# Patient Record
Sex: MALE | Race: WHITE | NOT HISPANIC OR LATINO
[De-identification: names, ages, dates, MRNs, and addresses within clinical notes are randomized per-mention and may not be internally consistent; named-entity substitution may affect disease eponyms.]

---

## 2017-05-03 ENCOUNTER — RESULT REVIEW (OUTPATIENT)
Age: 62
End: 2017-05-03

## 2017-07-21 ENCOUNTER — OUTPATIENT (OUTPATIENT)
Dept: OUTPATIENT SERVICES | Facility: HOSPITAL | Age: 62
LOS: 1 days | Discharge: HOME | End: 2017-07-21
Payer: COMMERCIAL

## 2017-07-21 ENCOUNTER — TRANSCRIPTION ENCOUNTER (OUTPATIENT)
Age: 62
End: 2017-07-21

## 2017-07-21 ENCOUNTER — RESULT REVIEW (OUTPATIENT)
Age: 62
End: 2017-07-21

## 2017-07-21 DIAGNOSIS — M71.20 SYNOVIAL CYST OF POPLITEAL SPACE [BAKER], UNSPECIFIED KNEE: Chronic | ICD-10-CM

## 2017-07-21 DIAGNOSIS — D50.9 IRON DEFICIENCY ANEMIA, UNSPECIFIED: ICD-10-CM

## 2017-07-21 DIAGNOSIS — Z98.89 OTHER SPECIFIED POSTPROCEDURAL STATES: Chronic | ICD-10-CM

## 2017-07-21 DIAGNOSIS — Z96.651 PRESENCE OF RIGHT ARTIFICIAL KNEE JOINT: Chronic | ICD-10-CM

## 2017-07-21 PROCEDURE — 88312 SPECIAL STAINS GROUP 1: CPT | Mod: 26

## 2017-07-21 PROCEDURE — 88305 TISSUE EXAM BY PATHOLOGIST: CPT | Mod: 26

## 2017-07-21 PROCEDURE — 43239 EGD BIOPSY SINGLE/MULTIPLE: CPT

## 2017-07-21 PROCEDURE — 88312 SPECIAL STAINS GROUP 1: CPT

## 2017-07-21 PROCEDURE — 88305 TISSUE EXAM BY PATHOLOGIST: CPT

## 2017-07-27 DIAGNOSIS — F43.10 POST-TRAUMATIC STRESS DISORDER, UNSPECIFIED: ICD-10-CM

## 2017-07-27 DIAGNOSIS — I10 ESSENTIAL (PRIMARY) HYPERTENSION: ICD-10-CM

## 2017-07-27 DIAGNOSIS — J45.909 UNSPECIFIED ASTHMA, UNCOMPLICATED: ICD-10-CM

## 2017-07-27 DIAGNOSIS — K29.50 UNSPECIFIED CHRONIC GASTRITIS WITHOUT BLEEDING: ICD-10-CM

## 2017-07-27 DIAGNOSIS — K21.9 GASTRO-ESOPHAGEAL REFLUX DISEASE WITHOUT ESOPHAGITIS: ICD-10-CM

## 2017-07-27 DIAGNOSIS — G47.33 OBSTRUCTIVE SLEEP APNEA (ADULT) (PEDIATRIC): ICD-10-CM

## 2017-07-27 DIAGNOSIS — E78.5 HYPERLIPIDEMIA, UNSPECIFIED: ICD-10-CM

## 2017-07-27 DIAGNOSIS — D64.9 ANEMIA, UNSPECIFIED: ICD-10-CM

## 2018-02-26 ENCOUNTER — OUTPATIENT (OUTPATIENT)
Dept: OUTPATIENT SERVICES | Facility: HOSPITAL | Age: 63
LOS: 1 days | End: 2018-02-26
Payer: COMMERCIAL

## 2018-02-26 ENCOUNTER — TRANSCRIPTION ENCOUNTER (OUTPATIENT)
Age: 63
End: 2018-02-26

## 2018-02-26 ENCOUNTER — APPOINTMENT (OUTPATIENT)
Dept: CT IMAGING | Facility: CLINIC | Age: 63
End: 2018-02-26

## 2018-02-26 DIAGNOSIS — Z00.8 ENCOUNTER FOR OTHER GENERAL EXAMINATION: ICD-10-CM

## 2018-02-26 DIAGNOSIS — Z98.89 OTHER SPECIFIED POSTPROCEDURAL STATES: Chronic | ICD-10-CM

## 2018-02-26 DIAGNOSIS — D64.9 ANEMIA, UNSPECIFIED: ICD-10-CM

## 2018-02-26 DIAGNOSIS — M71.20 SYNOVIAL CYST OF POPLITEAL SPACE [BAKER], UNSPECIFIED KNEE: Chronic | ICD-10-CM

## 2018-02-26 DIAGNOSIS — Z96.651 PRESENCE OF RIGHT ARTIFICIAL KNEE JOINT: Chronic | ICD-10-CM

## 2018-02-26 PROCEDURE — 74177 CT ABD & PELVIS W/CONTRAST: CPT | Mod: 26

## 2018-02-26 PROCEDURE — 82565 ASSAY OF CREATININE: CPT

## 2018-02-26 PROCEDURE — 74177 CT ABD & PELVIS W/CONTRAST: CPT

## 2019-02-19 ENCOUNTER — INPATIENT (INPATIENT)
Facility: HOSPITAL | Age: 64
LOS: 7 days | Discharge: HOME | DRG: 872 | End: 2019-02-27
Attending: FAMILY MEDICINE | Admitting: FAMILY MEDICINE
Payer: COMMERCIAL

## 2019-02-19 VITALS
TEMPERATURE: 97 F | HEIGHT: 70 IN | RESPIRATION RATE: 15 BRPM | SYSTOLIC BLOOD PRESSURE: 133 MMHG | HEART RATE: 93 BPM | OXYGEN SATURATION: 94 % | DIASTOLIC BLOOD PRESSURE: 74 MMHG | WEIGHT: 259.93 LBS

## 2019-02-19 DIAGNOSIS — Z98.89 OTHER SPECIFIED POSTPROCEDURAL STATES: Chronic | ICD-10-CM

## 2019-02-19 DIAGNOSIS — F43.10 POST-TRAUMATIC STRESS DISORDER, UNSPECIFIED: ICD-10-CM

## 2019-02-19 DIAGNOSIS — Z96.651 PRESENCE OF RIGHT ARTIFICIAL KNEE JOINT: Chronic | ICD-10-CM

## 2019-02-19 DIAGNOSIS — M71.20 SYNOVIAL CYST OF POPLITEAL SPACE [BAKER], UNSPECIFIED KNEE: Chronic | ICD-10-CM

## 2019-02-19 DIAGNOSIS — K57.92 DIVERTICULITIS OF INTESTINE, PART UNSPECIFIED, WITHOUT PERFORATION OR ABSCESS WITHOUT BLEEDING: ICD-10-CM

## 2019-02-19 DIAGNOSIS — K21.9 GASTRO-ESOPHAGEAL REFLUX DISEASE WITHOUT ESOPHAGITIS: ICD-10-CM

## 2019-02-19 DIAGNOSIS — I10 ESSENTIAL (PRIMARY) HYPERTENSION: ICD-10-CM

## 2019-02-19 DIAGNOSIS — J44.9 CHRONIC OBSTRUCTIVE PULMONARY DISEASE, UNSPECIFIED: ICD-10-CM

## 2019-02-19 DIAGNOSIS — G47.30 SLEEP APNEA, UNSPECIFIED: ICD-10-CM

## 2019-02-19 DIAGNOSIS — Z29.9 ENCOUNTER FOR PROPHYLACTIC MEASURES, UNSPECIFIED: ICD-10-CM

## 2019-02-19 LAB
ALBUMIN SERPL ELPH-MCNC: 3.6 G/DL — SIGNIFICANT CHANGE UP (ref 3.3–5)
ALP SERPL-CCNC: 82 U/L — SIGNIFICANT CHANGE UP (ref 40–120)
ALT FLD-CCNC: 32 U/L — SIGNIFICANT CHANGE UP (ref 12–78)
AMYLASE P1 CFR SERPL: 43 U/L — SIGNIFICANT CHANGE UP (ref 25–115)
ANION GAP SERPL CALC-SCNC: 10 MMOL/L — SIGNIFICANT CHANGE UP (ref 5–17)
APTT BLD: 30.4 SEC — SIGNIFICANT CHANGE UP (ref 28.5–37)
AST SERPL-CCNC: 21 U/L — SIGNIFICANT CHANGE UP (ref 15–37)
BILIRUB SERPL-MCNC: 0.6 MG/DL — SIGNIFICANT CHANGE UP (ref 0.2–1.2)
BUN SERPL-MCNC: 18 MG/DL — SIGNIFICANT CHANGE UP (ref 7–23)
CALCIUM SERPL-MCNC: 8.9 MG/DL — SIGNIFICANT CHANGE UP (ref 8.5–10.1)
CHLORIDE SERPL-SCNC: 103 MMOL/L — SIGNIFICANT CHANGE UP (ref 96–108)
CO2 SERPL-SCNC: 29 MMOL/L — SIGNIFICANT CHANGE UP (ref 22–31)
CREAT SERPL-MCNC: 1.2 MG/DL — SIGNIFICANT CHANGE UP (ref 0.5–1.3)
GLUCOSE SERPL-MCNC: 118 MG/DL — HIGH (ref 70–99)
HCT VFR BLD CALC: 36.6 % — LOW (ref 39–50)
HGB BLD-MCNC: 11.6 G/DL — LOW (ref 13–17)
INR BLD: 1.31 RATIO — HIGH (ref 0.88–1.16)
LIDOCAIN IGE QN: 77 U/L — SIGNIFICANT CHANGE UP (ref 73–393)
MCHC RBC-ENTMCNC: 26.5 PG — LOW (ref 27–34)
MCHC RBC-ENTMCNC: 31.7 GM/DL — LOW (ref 32–36)
MCV RBC AUTO: 83.6 FL — SIGNIFICANT CHANGE UP (ref 80–100)
NRBC # BLD: 0 /100 WBCS — SIGNIFICANT CHANGE UP (ref 0–0)
PLATELET # BLD AUTO: 169 K/UL — SIGNIFICANT CHANGE UP (ref 150–400)
POTASSIUM SERPL-MCNC: 3.8 MMOL/L — SIGNIFICANT CHANGE UP (ref 3.5–5.3)
POTASSIUM SERPL-SCNC: 3.8 MMOL/L — SIGNIFICANT CHANGE UP (ref 3.5–5.3)
PROT SERPL-MCNC: 7.9 G/DL — SIGNIFICANT CHANGE UP (ref 6–8.3)
PROTHROM AB SERPL-ACNC: 15 SEC — HIGH (ref 10–12.9)
RBC # BLD: 4.38 M/UL — SIGNIFICANT CHANGE UP (ref 4.2–5.8)
RBC # FLD: 17.5 % — HIGH (ref 10.3–14.5)
SODIUM SERPL-SCNC: 142 MMOL/L — SIGNIFICANT CHANGE UP (ref 135–145)
WBC # BLD: 14.61 K/UL — HIGH (ref 3.8–10.5)
WBC # FLD AUTO: 14.61 K/UL — HIGH (ref 3.8–10.5)

## 2019-02-19 PROCEDURE — 93010 ELECTROCARDIOGRAM REPORT: CPT

## 2019-02-19 PROCEDURE — 99223 1ST HOSP IP/OBS HIGH 75: CPT | Mod: AI

## 2019-02-19 PROCEDURE — 74177 CT ABD & PELVIS W/CONTRAST: CPT | Mod: 26

## 2019-02-19 PROCEDURE — 99223 1ST HOSP IP/OBS HIGH 75: CPT

## 2019-02-19 PROCEDURE — 71045 X-RAY EXAM CHEST 1 VIEW: CPT | Mod: 26

## 2019-02-19 PROCEDURE — 99285 EMERGENCY DEPT VISIT HI MDM: CPT

## 2019-02-19 RX ORDER — ONDANSETRON 8 MG/1
4 TABLET, FILM COATED ORAL ONCE
Qty: 0 | Refills: 0 | Status: COMPLETED | OUTPATIENT
Start: 2019-02-19 | End: 2019-02-19

## 2019-02-19 RX ORDER — ALBUTEROL 90 UG/1
2 AEROSOL, METERED ORAL EVERY 6 HOURS
Qty: 0 | Refills: 0 | Status: DISCONTINUED | OUTPATIENT
Start: 2019-02-19 | End: 2019-02-27

## 2019-02-19 RX ORDER — SIMVASTATIN 20 MG/1
40 TABLET, FILM COATED ORAL AT BEDTIME
Qty: 0 | Refills: 0 | Status: DISCONTINUED | OUTPATIENT
Start: 2019-02-19 | End: 2019-02-27

## 2019-02-19 RX ORDER — SODIUM CHLORIDE 9 MG/ML
1000 INJECTION INTRAMUSCULAR; INTRAVENOUS; SUBCUTANEOUS ONCE
Qty: 0 | Refills: 0 | Status: COMPLETED | OUTPATIENT
Start: 2019-02-19 | End: 2019-02-19

## 2019-02-19 RX ORDER — BUPROPION HYDROCHLORIDE 150 MG/1
100 TABLET, EXTENDED RELEASE ORAL
Qty: 0 | Refills: 0 | Status: DISCONTINUED | OUTPATIENT
Start: 2019-02-19 | End: 2019-02-22

## 2019-02-19 RX ORDER — MORPHINE SULFATE 50 MG/1
4 CAPSULE, EXTENDED RELEASE ORAL ONCE
Qty: 0 | Refills: 0 | Status: DISCONTINUED | OUTPATIENT
Start: 2019-02-19 | End: 2019-02-19

## 2019-02-19 RX ORDER — TIOTROPIUM BROMIDE 18 UG/1
1 CAPSULE ORAL; RESPIRATORY (INHALATION) DAILY
Qty: 0 | Refills: 0 | Status: DISCONTINUED | OUTPATIENT
Start: 2019-02-19 | End: 2019-02-27

## 2019-02-19 RX ORDER — PANTOPRAZOLE SODIUM 20 MG/1
40 TABLET, DELAYED RELEASE ORAL
Qty: 0 | Refills: 0 | Status: DISCONTINUED | OUTPATIENT
Start: 2019-02-19 | End: 2019-02-27

## 2019-02-19 RX ORDER — SERTRALINE 25 MG/1
200 TABLET, FILM COATED ORAL DAILY
Qty: 0 | Refills: 0 | Status: DISCONTINUED | OUTPATIENT
Start: 2019-02-19 | End: 2019-02-27

## 2019-02-19 RX ORDER — LACTOBACILLUS ACIDOPHILUS 100MM CELL
1 CAPSULE ORAL
Qty: 0 | Refills: 0 | Status: DISCONTINUED | OUTPATIENT
Start: 2019-02-19 | End: 2019-02-27

## 2019-02-19 RX ORDER — PIPERACILLIN AND TAZOBACTAM 4; .5 G/20ML; G/20ML
3.38 INJECTION, POWDER, LYOPHILIZED, FOR SOLUTION INTRAVENOUS EVERY 8 HOURS
Qty: 0 | Refills: 0 | Status: DISCONTINUED | OUTPATIENT
Start: 2019-02-19 | End: 2019-02-26

## 2019-02-19 RX ORDER — PIPERACILLIN AND TAZOBACTAM 4; .5 G/20ML; G/20ML
3.38 INJECTION, POWDER, LYOPHILIZED, FOR SOLUTION INTRAVENOUS ONCE
Qty: 0 | Refills: 0 | Status: COMPLETED | OUTPATIENT
Start: 2019-02-19 | End: 2019-02-19

## 2019-02-19 RX ORDER — BUDESONIDE AND FORMOTEROL FUMARATE DIHYDRATE 160; 4.5 UG/1; UG/1
2 AEROSOL RESPIRATORY (INHALATION)
Qty: 0 | Refills: 0 | Status: DISCONTINUED | OUTPATIENT
Start: 2019-02-19 | End: 2019-02-27

## 2019-02-19 RX ORDER — AMLODIPINE BESYLATE 2.5 MG/1
10 TABLET ORAL DAILY
Qty: 0 | Refills: 0 | Status: DISCONTINUED | OUTPATIENT
Start: 2019-02-19 | End: 2019-02-27

## 2019-02-19 RX ORDER — FLUTICASONE PROPIONATE 50 MCG
1 SPRAY, SUSPENSION NASAL DAILY
Qty: 0 | Refills: 0 | Status: DISCONTINUED | OUTPATIENT
Start: 2019-02-19 | End: 2019-02-27

## 2019-02-19 RX ORDER — IOHEXOL 300 MG/ML
30 INJECTION, SOLUTION INTRAVENOUS ONCE
Qty: 0 | Refills: 0 | Status: COMPLETED | OUTPATIENT
Start: 2019-02-19 | End: 2019-02-19

## 2019-02-19 RX ORDER — CLONAZEPAM 1 MG
1 TABLET ORAL THREE TIMES A DAY
Qty: 0 | Refills: 0 | Status: DISCONTINUED | OUTPATIENT
Start: 2019-02-19 | End: 2019-02-26

## 2019-02-19 RX ORDER — FAMOTIDINE 10 MG/ML
20 INJECTION INTRAVENOUS
Qty: 0 | Refills: 0 | Status: DISCONTINUED | OUTPATIENT
Start: 2019-02-19 | End: 2019-02-19

## 2019-02-19 RX ORDER — SODIUM CHLORIDE 9 MG/ML
1000 INJECTION, SOLUTION INTRAVENOUS
Qty: 0 | Refills: 0 | Status: DISCONTINUED | OUTPATIENT
Start: 2019-02-19 | End: 2019-02-20

## 2019-02-19 RX ORDER — ARIPIPRAZOLE 15 MG/1
20 TABLET ORAL DAILY
Qty: 0 | Refills: 0 | Status: DISCONTINUED | OUTPATIENT
Start: 2019-02-19 | End: 2019-02-27

## 2019-02-19 RX ORDER — TRAZODONE HCL 50 MG
200 TABLET ORAL DAILY
Qty: 0 | Refills: 0 | Status: DISCONTINUED | OUTPATIENT
Start: 2019-02-19 | End: 2019-02-27

## 2019-02-19 RX ADMIN — BUPROPION HYDROCHLORIDE 100 MILLIGRAM(S): 150 TABLET, EXTENDED RELEASE ORAL at 15:55

## 2019-02-19 RX ADMIN — MORPHINE SULFATE 4 MILLIGRAM(S): 50 CAPSULE, EXTENDED RELEASE ORAL at 08:02

## 2019-02-19 RX ADMIN — IOHEXOL 30 MILLILITER(S): 300 INJECTION, SOLUTION INTRAVENOUS at 08:02

## 2019-02-19 RX ADMIN — ARIPIPRAZOLE 20 MILLIGRAM(S): 15 TABLET ORAL at 22:24

## 2019-02-19 RX ADMIN — SODIUM CHLORIDE 1000 MILLILITER(S): 9 INJECTION INTRAMUSCULAR; INTRAVENOUS; SUBCUTANEOUS at 08:00

## 2019-02-19 RX ADMIN — SODIUM CHLORIDE 1000 MILLILITER(S): 9 INJECTION INTRAMUSCULAR; INTRAVENOUS; SUBCUTANEOUS at 09:00

## 2019-02-19 RX ADMIN — Medication 1 MILLIGRAM(S): at 15:55

## 2019-02-19 RX ADMIN — Medication 1 MILLIGRAM(S): at 21:37

## 2019-02-19 RX ADMIN — ONDANSETRON 4 MILLIGRAM(S): 8 TABLET, FILM COATED ORAL at 08:02

## 2019-02-19 RX ADMIN — PIPERACILLIN AND TAZOBACTAM 25 GRAM(S): 4; .5 INJECTION, POWDER, LYOPHILIZED, FOR SOLUTION INTRAVENOUS at 15:30

## 2019-02-19 RX ADMIN — PIPERACILLIN AND TAZOBACTAM 200 GRAM(S): 4; .5 INJECTION, POWDER, LYOPHILIZED, FOR SOLUTION INTRAVENOUS at 12:20

## 2019-02-19 RX ADMIN — Medication 1 SPRAY(S): at 21:31

## 2019-02-19 RX ADMIN — SODIUM CHLORIDE 75 MILLILITER(S): 9 INJECTION, SOLUTION INTRAVENOUS at 18:29

## 2019-02-19 RX ADMIN — Medication 1 TABLET(S): at 17:40

## 2019-02-19 RX ADMIN — PANTOPRAZOLE SODIUM 40 MILLIGRAM(S): 20 TABLET, DELAYED RELEASE ORAL at 17:37

## 2019-02-19 RX ADMIN — AMLODIPINE BESYLATE 10 MILLIGRAM(S): 2.5 TABLET ORAL at 15:55

## 2019-02-19 RX ADMIN — Medication 200 MILLIGRAM(S): at 23:31

## 2019-02-19 RX ADMIN — BUDESONIDE AND FORMOTEROL FUMARATE DIHYDRATE 2 PUFF(S): 160; 4.5 AEROSOL RESPIRATORY (INHALATION) at 21:32

## 2019-02-19 RX ADMIN — SIMVASTATIN 40 MILLIGRAM(S): 20 TABLET, FILM COATED ORAL at 21:31

## 2019-02-19 RX ADMIN — PIPERACILLIN AND TAZOBACTAM 25 GRAM(S): 4; .5 INJECTION, POWDER, LYOPHILIZED, FOR SOLUTION INTRAVENOUS at 21:33

## 2019-02-19 RX ADMIN — MORPHINE SULFATE 4 MILLIGRAM(S): 50 CAPSULE, EXTENDED RELEASE ORAL at 12:12
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
## 2019-02-19 NOTE — CONSULT NOTE ADULT - SUBJECTIVE AND OBJECTIVE BOX
HPI: 63y man presenting with worsening RLQ pain.  No previous episodes or similar symptoms.  Known to have diverticulosis from previous Colonoscopies.  Never known to have prior diverticulitis.  Hypertensive history but denies any other significant medical problems.    PAST MEDICAL & SURGICAL HISTORY:  Sleep apnea: does not use CPAP machine  Osteoarthrosis, localized  PTSD (post-traumatic stress disorder): 2001  GERD (gastroesophageal reflux disease)  COPD (chronic obstructive pulmonary disease)  High cholesterol  Hypertension  S/P arthroscopic surgery of left knee  S/P wrist surgery: ganglion cyst  S/P tonsillectomy and adenoidectomy  S/P knee replacement, right    Home Medications:  Abilify 20 mg oral tablet: 1 tab(s) orally once a day (at bedtime) (2019 13:14)  albuterol:  inhaled , As Needed (2019 13:14)  amLODIPine 10 mg oral tablet: 1 tab(s) orally once a day (2019 13:14)  Astepro 205.5 mcg/inh nasal spray: 2 spray(s) nasal once a day (at bedtime) (2019 13:14)  Calcium 600+D 600 mg-200 units oral tablet: 1 tab(s) orally once a day (2019 13:14)  cetirizine 10 mg oral tablet: 1  orally once a day (2019 13:14)  clonazepam 1 mg oral tablet: 1 tab(s) orally 3 times a day (2019 13:14)  Nasonex 50 mcg/inh nasal spray: 2 spray(s) nasal once a day (2019 13:14)  Nexium 40 mg oral delayed release capsule: 1 cap(s) orally 2 times a day - Takees in morning and afternoon (2019 13:14)  NexIUM 40 mg oral delayed release capsule: 1 cap(s) orally 2 times a day (2019 13:14)  ranitidine 150 mg oral capsule: 1  orally once a day (at bedtime) (2019 13:14)  simvastatin 20 mg oral tablet: 2 tab(s) orally once a day (at bedtime) (2019 13:14)  Spiriva 18 mcg inhalation capsule: 1 each inhaled once a day (2019 13:14)  Symbicort 160 mcg-4.5 mcg/inh inhalation aerosol: 2 puff(s) inhaled 2 times a day (2019 13:14)  traZODone extended release: 200 milligram(s) orally once a day (2019 13:14)  Wellbutrin: 100 milligram(s) orally 2 times a day (2019 13:14)  Zoloft: 200 milligram(s) orally once a day (2019 13:14)      MEDICATIONS  (STANDING):  amLODIPine   Tablet 10 milliGRAM(s) Oral daily  ARIPiprazole 20 milliGRAM(s) Oral daily  buDESOnide 160 MICROgram(s)/formoterol 4.5 MICROgram(s) Inhaler 2 Puff(s) Inhalation two times a day  buPROPion  milliGRAM(s) Oral two times a day  clonazePAM Tablet 1 milliGRAM(s) Oral three times a day  fluticasone propionate 50 MICROgram(s)/spray Nasal Spray 1 Spray(s) Both Nostrils daily  pantoprazole    Tablet 40 milliGRAM(s) Oral two times a day  piperacillin/tazobactam IVPB. 3.375 Gram(s) IV Intermittent every 8 hours  sertraline 200 milliGRAM(s) Oral daily  simvastatin 40 milliGRAM(s) Oral at bedtime  tiotropium 18 MICROgram(s) Capsule 1 Capsule(s) Inhalation daily  traZODone 200 milliGRAM(s) Oral daily    MEDICATIONS  (PRN):  ALBUTerol    90 MICROgram(s) HFA Inhaler 2 Puff(s) Inhalation every 6 hours PRN Wheezing      Allergies:  latex (Rash)  No Known Drug Allergies    SOCIAL HISTORY:  Denies ETOH or Tobacco.    FAMILY HISTORY:  No pertinent family history in first degree relatives      Vital Signs Last 24 Hrs  T(C): 36.1 (2019 07:33), Max: 36.1 (2019 07:33)  T(F): 97 (2019 07:33), Max: 97 (2019 07:33)  HR: 84 (2019 09:45) (84 - 93)  BP: 128/65 (2019 09:45) (128/65 - 133/74)  BP(mean): --  RR: 16 (2019 09:45) (15 - 16)  SpO2: 96% (2019 09:45) (94% - 96%)    LABS:                        11.6   14.61 )-----------( 169      ( 2019 07:57 )             36.6         142  |  103  |  18  ----------------------------<  118<H>  3.8   |  29  |  1.20    Ca    8.9      2019 07:57    TPro  7.9  /  Alb  3.6  /  TBili  0.6  /  DBili  x   /  AST  21  /  ALT  32  /  AlkPhos  82      PT/INR - ( 2019 07:57 )   PT: 15.0 sec;   INR: 1.31 ratio         PTT - ( 2019 07:57 )  PTT:30.4 sec  Urinalysis Basic - ( 2019 09:41 )    Color: Yellow / Appearance: Slightly Turbid / S.015 / pH: x  Gluc: x / Ketone: Trace  / Bili: Small / Urobili: 1   Blood: x / Protein: 75 mg/dL / Nitrite: Negative   Leuk Esterase: Trace / RBC: 6-10 /HPF / WBC 0-2   Sq Epi: x / Non Sq Epi: Occasional / Bacteria: x        RADIOLOGY & ADDITIONAL STUDIES:    EXAM:  CT ABDOMEN AND PELVIS OC IC                            PROCEDURE DATE:  2019          INTERPRETATION:  CT ABDOMEN AND PELVIS    CLINICAL INFORMATION:  Right lower quadrant abdominal pain.    PROCEDURE:  Using multislice helical CT, oral contrast, and following the   intravenous administration of 95 cc Omnipaque 350 , 2.5 mm sections were   obtained from the domes of the diaphragm to the ischial tuberosities.    Multiplanar MPR's were performed.    COMPARISON: CT scan abdomen and pelvis 2018.    FINDINGS:      There is fatty infiltration of the liver. No biliary ductal dilatation is   noted. The gallbladder and spleen appear unremarkable.  The adrenal glands and pancreas are unremarkable in appearance.    No hydroureteronephrosis is noted.    There is arteriosclerotic calcification of the abdominal aorta, which is   normal in caliber.  No enlarged retroperitoneal lymphadenopathy is noted.    There is sigmoid diverticulosis, with segmental bowel wall thickening of   the mid sigmoid colon in the right pelvis. There is extensive pericolic   inflammatory stranding and small bubbles of extraluminal air.  There is a 4.8 x 1.3 cm adjacent fluid collection in the right lower   quadrant.  This is immediately posterior to thetip of the appendix.     No free air is noted in the greater peritoneal cavity.    No free fluid is noted within the pelvis.    Urinary bladder appears unremarkable.    There are small bilateral fat-containing inguinal hernias.    There are multilevel degenerative changes of the spine.    Impression:    CT findings as discussed with small localized fluid collection right   lower quadrant likely secondary to sigmoid diverticulitis with localized   microperforation.  Cannot exclude tip appendicitis.  This finding was discussed with Dr. Saldaña  by telephone at the time of   interpretation on 2019.    SHERI RUDOLPH M.D., ATTENDING RADIOLOGIST  This document has been electronically signed. 2019 11:09AM

## 2019-02-19 NOTE — H&P ADULT - ATTENDING COMMENTS
pt seen and examine see above plan .IMPROVE VTE Individual Risk Assessment          RISK                                                          Points    [  ] Previous VTE                                                3    [  ] Thrombophilia                                             2    [  ] Lower limb paralysis                                    2        (unable to hold up >15 seconds)      [  ] Current Cancer                                             2         (within 6 months)    [  ] Immobilization > 24 hrs                              1    [  ] ICU/CCU stay > 24 hours                            1    [  ] Age > 60                                                    1    IMPROVE VTE Score ___1______     will hold any chemical dvt prophy this time as if need  any surgical intervention in future sec to microperforation - continue Venodyne boot bl low ext . pt seen and examine see above plan .IMPROVE VTE Individual Risk Assessment          RISK                                                          Points    [  ] Previous VTE                                                3    [  ] Thrombophilia                                             2    [  ] Lower limb paralysis                                    2        (unable to hold up >15 seconds)      [  ] Current Cancer                                             2         (within 6 months)    [  ] Immobilization > 24 hrs                              1    [  ] ICU/CCU stay > 24 hours                            1    [  ] Age > 60                                                    1    IMPROVE VTE Score ___1______     will hold any chemical dvt prophy this time as if need  any surgical intervention in future sec to microperforation - continue Venodyne boot bl low ext . morbid   obesity-  diet and exercise .

## 2019-02-19 NOTE — ED ADULT NURSE NOTE - PMH
Asthma    Bakers cyst  Bilateral  Chronic rhinosinusitis    COPD (chronic obstructive pulmonary disease)    GERD (gastroesophageal reflux disease)    High cholesterol    Hypertension    Osteoarthrosis, localized    PTSD (post-traumatic stress disorder)  September 11th 2001  Sleep apnea  does not use CPAP machine

## 2019-02-19 NOTE — ED ADULT NURSE NOTE - OBJECTIVE STATEMENT
Received the patient in the Er. Patient is alert and oriented. Skin warm and dry. C/O Right lower abdominal pain. Abdomen soft and tender.

## 2019-02-19 NOTE — H&P ADULT - NSHPSOCIALHISTORY_GEN_ALL_CORE
quit smoking 30 y ago smoked 10 - 15 y ago , no drugs , no etoh , retired , 4 children , no flu shot , had endoscopy  wnl and colonoscopy 2 y ago  diagnosed with diverticulosis .

## 2019-02-19 NOTE — ED PROVIDER NOTE - CONSTITUTIONAL, MLM
normal... Uncomfortable appearing white male, well nourished, awake, alert, oriented to person, place, time/situation and in mild apparent distress. Uncomfortable appearing obese white male, well nourished, awake, alert, oriented to person, place, time/situation and in mild apparent distress.

## 2019-02-19 NOTE — H&P ADULT - HISTORY OF PRESENT ILLNESS
63 m from home hx htn , hx  sleep apnea  night cpap prn set 4 , mood dis/ PTSD , hx copd not home bound  , hx diverticulosis , hx of bl knee replacement , hx gerd . pt   came to pv ed   1 day  c/o of  abdominal pain lt lower  side  with   nonbloody diarrhea  associated with nausea  but  no vomiting  , no fever  . pt said still tolerated his meal yesterday  , pt had no fever or  no other associated symptom .  pt admitted with microperforation with sigmoid diverticulitis   pt had  endo and colonoscopy done 2 y ago found  to have diverticulosis , pt state no hx gi bleed , no hx gastric ulcer  .   in ed Impression:    CT findings as discussed with small localized fluid collection right   lower quadrant likely secondary to sigmoid diverticulitis with localized   microperforation.  Cannot exclude tip appendicitis.  This finding was discussed with Dr. Saldaña  by telephone at the time of   interpretation on 2/19/2019.

## 2019-02-19 NOTE — ED ADULT TRIAGE NOTE - CHIEF COMPLAINT QUOTE
"I got a pain in my left side since yesterday and Im throwing up" "I got a pain in my right side since yesterday and Im throwing up"

## 2019-02-19 NOTE — CONSULT NOTE ADULT - ASSESSMENT
63y obese gentleman presenting with RLQ pain.  Found to have diverticulitis of sigmoid colon extending to RLQ as sigmoid quite redundant.  Small pericolonic fluid collection suspicious for microperforation.  This is in the vicinity of the appendix however doubtful for appendicitis.  Recommend conservative management of diverticular disease to avoid need for acute resection and colostomy.  R/B/A to therapy discussed wtih patient and all questions answered.  Clear liquid diet  IV antibiotics with transition to Oral anabiotics upon resolution of symptoms at time of discharge.  If conservative management fails, will likely need surgical intervention for partial colectomy and colostomy.  Will follow.  Medical management of HTN
62 yo white male w pmhx as above, who was well up until yesterday morning when he began to experience lower abdominal pain, cramp, gradual onset with associated mild diarrhea and nausea, found to have diverticulitis. gi consulted
Diverticulitis w perforation      Suggestions--  Continue antibiotics  F/U Cx data  Serial abdominal exams  Surgical follow up  Reviewed with patient in dewtail, all questions answered.    D/W Dr. Perez.    Thank you for the courtesy of this referral.    Andi Green MD  [phone]

## 2019-02-19 NOTE — H&P ADULT - PROBLEM SELECTOR PLAN 1
with microperforation - npo except meds  , iv fluid d5ns 75 cc/ hr  , iv abx Zosyn , fu blood cult .   surg dr fonseca  , gi dr macias  , id dr rincon  .

## 2019-02-19 NOTE — ED ADULT NURSE NOTE - PSH
Bakers cyst  Bilateral removal Bakers Cyst  S/P arthroscopic surgery of left knee    S/P knee replacement, right    S/P tonsillectomy and adenoidectomy    S/P wrist surgery  ganglion cyst

## 2019-02-19 NOTE — H&P ADULT - PSH
S/P arthroscopic surgery of left knee    S/P knee replacement, right    S/P tonsillectomy and adenoidectomy    S/P wrist surgery  ganglion cyst

## 2019-02-19 NOTE — H&P ADULT - PROBLEM SELECTOR PLAN 6
-  pt is on multiple  meds continue on   all home meds- Abilify  20 mg , Zoloft 200  , Wellbutrin 200, Trazadone  200 mg.

## 2019-02-19 NOTE — CONSULT NOTE ADULT - NEGATIVE GASTROINTESTINAL SYMPTOMS
no constipation/no hematochezia/no hiccoughs/no change in bowel habits/no steatorrhea/no melena/no jaundice

## 2019-02-19 NOTE — CONSULT NOTE ADULT - SUBJECTIVE AND OBJECTIVE BOX
Chief Complaint:  Patient is a 63y old  Male who presents with a chief complaint of   Sleep apnea  Bakers cyst  Osteoarthrosis, localized  PTSD (post-traumatic stress disorder)  Chronic rhinosinusitis  GERD (gastroesophageal reflux disease)  Asthma  COPD (chronic obstructive pulmonary disease)  High cholesterol  Hypertension  Bakers cyst  S/P arthroscopic surgery of left knee  S/P wrist surgery  S/P tonsillectomy and adenoidectomy  S/P knee replacement, right      HPI:  64 yo white male with H/O Asthma    	Bakers cyst  Bilateral  	Chronic rhinosinusitis    	COPD (chronic obstructive pulmonary disease)    	GERD (gastroesophageal reflux disease)    	High cholesterol    	Hypertension    	Osteoarthrosis, localized    	PTSD (post-traumatic stress disorder)  2001 and   Sleep apnea  (does not use CPAP machine) who was well up until yesterday morning when he began to experience lower abdominal pain, cramp, gradual onset with associated mild diarrhea and nausea. Pain persisted and upon awakening this morning noted that pain was now present in RLQ. No appetite. No fever, chills, dysuria or hematuria.    gi consulted for diverticulitis. chart reviewed- sp egd for anemia 2017 findings of gastritis, path neg for acute. pt seen and examined.    recent vs/labs/imaging reviewed- afebrile      latex (Rash)  No Known Drug Allergies      ALBUTerol    90 MICROgram(s) HFA Inhaler 2 Puff(s) Inhalation every 6 hours PRN  amLODIPine   Tablet 10 milliGRAM(s) Oral daily  ARIPiprazole 20 milliGRAM(s) Oral daily  buDESOnide 160 MICROgram(s)/formoterol 4.5 MICROgram(s) Inhaler 2 Puff(s) Inhalation two times a day  buPROPion  milliGRAM(s) Oral two times a day  clonazePAM Tablet 1 milliGRAM(s) Oral three times a day  fluticasone propionate 50 MICROgram(s)/spray Nasal Spray 1 Spray(s) Both Nostrils daily  pantoprazole    Tablet 40 milliGRAM(s) Oral two times a day  piperacillin/tazobactam IVPB. 3.375 Gram(s) IV Intermittent every 8 hours  sertraline 200 milliGRAM(s) Oral daily  simvastatin 40 milliGRAM(s) Oral at bedtime  tiotropium 18 MICROgram(s) Capsule 1 Capsule(s) Inhalation daily  traZODone 200 milliGRAM(s) Oral daily        FAMILY HISTORY:  No pertinent family history in first degree relatives        Review of Systems:    General:  No wt loss, fevers, chills, night sweats, fatigue  Eyes:  Good vision, no reported pain  ENT:  No sore throat, pain, runny nose, dysphagia  CV:  No pain, palpitations, no lightheadedness  Resp:  No dyspnea, cough, tachypnea, wheezing  GI: .  :  No pain, bleeding, incontinence, nocturia  Muscle:  No pain, weakness  Neuro:  No weakness, tingling, memory problems  Psych:  No fatigue, insomnia, mood problems, depression  Endocrine:  No polyuria, polydypsia, cold/heat intolerance  Heme:  No petechiae, ecchymosis, easy bruisability  Skin:  No rash, tattoos, scars, edema    Relevant Family History:   n/c    Relevant Social History: n/c      Physical Exam:    Vital Signs:  Vital Signs Last 24 Hrs  T(C): 36.1 (2019 07:33), Max: 36.1 (2019 07:33)  T(F): 97 (2019 07:33), Max: 97 (2019 07:33)  HR: 84 (2019 09:45) (84 - 93)  BP: 128/65 (2019 09:45) (128/65 - 133/74)  BP(mean): --  RR: 16 (2019 09:45) (15 - 16)  SpO2: 96% (2019 09:45) (94% - 96%)  Daily Height in cm: 177.8 (2019 07:33)    Daily     General:  Appears stated age, well-groomed, nad  HEENT:  NC/AT,  conjunctivae clear and pink, no thyromegaly, nodules, adenopathy, no JVD  Chest:  Full & symmetric excursion, no increased effort, breath sounds clear  Cardiovascular:  Regular rhythm, S1, S2, no murmur/rub/S3/S4, no abdominal bruit, no edema  Abdomen:  Soft, non-tender, non-distended, normoactive bowel sounds,  no masses ,no hepatosplenomeagaly, no signs of chronic liver disease  Extremities:  no cyanosis,clubbing or edema  Skin:  No rash/erythema/ecchymoses/petechiae/wounds/abscess/warm/dry  Neuro/Psych:  A&O  , no asterixis, no tremor, no encephalopathy    Laboratory:                            11.6   14.61 )-----------( 169      ( 2019 07:57 )             36.6     02-    142  |  103  |  18  ----------------------------<  118<H>  3.8   |  29  |  1.20    Ca    8.9      2019 07:57    TPro  7.9  /  Alb  3.6  /  TBili  0.6  /  DBili  x   /  AST  21  /  ALT  32  /  AlkPhos  82  -    LIVER FUNCTIONS - ( 2019 07:57 )  Alb: 3.6 g/dL / Pro: 7.9 g/dL / ALK PHOS: 82 U/L / ALT: 32 U/L / AST: 21 U/L / GGT: x           PT/INR - ( 2019 07:57 )   PT: 15.0 sec;   INR: 1.31 ratio         PTT - ( 2019 07:57 )  PTT:30.4 sec  Urinalysis Basic - ( 2019 09:41 )    Color: Yellow / Appearance: Slightly Turbid / S.015 / pH: x  Gluc: x / Ketone: Trace  / Bili: Small / Urobili: 1   Blood: x / Protein: 75 mg/dL / Nitrite: Negative   Leuk Esterase: Trace / RBC: 6-10 /HPF / WBC 0-2   Sq Epi: x / Non Sq Epi: Occasional / Bacteria: x      Amylase Serum43      Lipase serum77       Ammonia--    Imaging: Chief Complaint:  Patient is a 63y old  Male who presents with a chief complaint of   Sleep apnea  Bakers cyst  Osteoarthrosis, localized  PTSD (post-traumatic stress disorder)  Chronic rhinosinusitis  GERD (gastroesophageal reflux disease)  Asthma  COPD (chronic obstructive pulmonary disease)  High cholesterol  Hypertension  Bakers cyst  S/P arthroscopic surgery of left knee  S/P wrist surgery  S/P tonsillectomy and adenoidectomy  S/P knee replacement, right      HPI:  64 yo white male with H/O Asthma    	Bakers cyst  Bilateral  	Chronic rhinosinusitis    	COPD (chronic obstructive pulmonary disease)    	GERD (gastroesophageal reflux disease)    	High cholesterol    	Hypertension    	Osteoarthrosis, localized    	PTSD (post-traumatic stress disorder)  2001 and   Sleep apnea  (does not use CPAP machine) who was well up until yesterday morning when he began to experience lower abdominal pain, cramp, gradual onset with associated mild diarrhea and nausea. Pain persisted and upon awakening this morning noted that pain was now present in RLQ. No appetite. No fever, chills, dysuria or hematuria.    gi consulted for diverticulitis. chart reviewed- sp egd for anemia 2017 findings of gastritis, path neg for acute. pt seen and examined.    recent vs/labs/imaging reviewed- afebrile  +leukocytosis  inr 1.31  ct a/p Impression:    CT findings as discussed with small localized fluid collection right   lower quadrant likely secondary to sigmoid diverticulitis with localized   microperforation.  Cannot exclude tip appendicitis.  This finding was discussed with Dr. Saldaña  by telephone at the time of   interpretation on 2019.          latex (Rash)  No Known Drug Allergies      ALBUTerol    90 MICROgram(s) HFA Inhaler 2 Puff(s) Inhalation every 6 hours PRN  amLODIPine   Tablet 10 milliGRAM(s) Oral daily  ARIPiprazole 20 milliGRAM(s) Oral daily  buDESOnide 160 MICROgram(s)/formoterol 4.5 MICROgram(s) Inhaler 2 Puff(s) Inhalation two times a day  buPROPion  milliGRAM(s) Oral two times a day  clonazePAM Tablet 1 milliGRAM(s) Oral three times a day  fluticasone propionate 50 MICROgram(s)/spray Nasal Spray 1 Spray(s) Both Nostrils daily  pantoprazole    Tablet 40 milliGRAM(s) Oral two times a day  piperacillin/tazobactam IVPB. 3.375 Gram(s) IV Intermittent every 8 hours  sertraline 200 milliGRAM(s) Oral daily  simvastatin 40 milliGRAM(s) Oral at bedtime  tiotropium 18 MICROgram(s) Capsule 1 Capsule(s) Inhalation daily  traZODone 200 milliGRAM(s) Oral daily        FAMILY HISTORY:  No pertinent family history in first degree relatives        Review of Systems:    General:  No wt loss, fevers, chills, night sweats, fatigue  Eyes:  Good vision, no reported pain  ENT:  No sore throat, pain, runny nose, dysphagia  CV:  No pain, palpitations, no lightheadedness  Resp:  No dyspnea, cough, tachypnea, wheezing  GI: see above  :  No pain, bleeding, incontinence, nocturia  Muscle:  No pain, weakness  Neuro:  No weakness, tingling, memory problems  Psych:  No fatigue, insomnia, mood problems, depression  Endocrine:  No polyuria, polydypsia, cold/heat intolerance  Heme:  No petechiae, ecchymosis, easy bruisability  Skin:  No rash, tattoos, scars, edema    Relevant Family History:   n/c    Relevant Social History: n/c      Physical Exam:    Vital Signs:  Vital Signs Last 24 Hrs  T(C): 36.1 (2019 07:33), Max: 36.1 (2019 07:33)  T(F): 97 (2019 07:33), Max: 97 (2019 07:33)  HR: 84 (2019 09:45) (84 - 93)  BP: 128/65 (2019 09:45) (128/65 - 133/74)  BP(mean): --  RR: 16 (2019 09:45) (15 - 16)  SpO2: 96% (2019 09:45) (94% - 96%)  Daily Height in cm: 177.8 (2019 07:33)    Daily     General:  Appears stated age, well-groomed, nad  HEENT:  NC/AT,  conjunctivae clear and pink, no thyromegaly, nodules, adenopathy, no JVD  Chest:  Full & symmetric excursion, no increased effort, breath sounds clear  Cardiovascular:  Regular rhythm, S1, S2, no murmur/rub/S3/S4, no abdominal bruit, no edema  Abdomen:  Soft, non-tender, non-distended, normoactive bowel sounds,  no masses ,no hepatosplenomeagaly, no signs of chronic liver disease  Extremities:  no cyanosis,clubbing or edema  Skin:  No rash/erythema/ecchymoses/petechiae/wounds/abscess/warm/dry  Neuro/Psych:  A&O  , no asterixis, no tremor, no encephalopathy    Laboratory:                            11.6   14.61 )-----------( 169      ( 2019 07:57 )             36.6         142  |  103  |  18  ----------------------------<  118<H>  3.8   |  29  |  1.20    Ca    8.9      2019 07:57    TPro  7.9  /  Alb  3.6  /  TBili  0.6  /  DBili  x   /  AST  21  /  ALT  32  /  AlkPhos  82      LIVER FUNCTIONS - ( 2019 07:57 )  Alb: 3.6 g/dL / Pro: 7.9 g/dL / ALK PHOS: 82 U/L / ALT: 32 U/L / AST: 21 U/L / GGT: x           PT/INR - ( 2019 07:57 )   PT: 15.0 sec;   INR: 1.31 ratio         PTT - ( 2019 07:57 )  PTT:30.4 sec  Urinalysis Basic - ( 2019 09:41 )    Color: Yellow / Appearance: Slightly Turbid / S.015 / pH: x  Gluc: x / Ketone: Trace  / Bili: Small / Urobili: 1   Blood: x / Protein: 75 mg/dL / Nitrite: Negative   Leuk Esterase: Trace / RBC: 6-10 /HPF / WBC 0-2   Sq Epi: x / Non Sq Epi: Occasional / Bacteria: x      Amylase Serum43      Lipase serum77       Ammonia--    Imaging:    < from: CT Abdomen and Pelvis w/ Oral Cont and w/ IV Cont (19 @ 10:13) >    EXAM:  CT ABDOMEN AND PELVIS OC IC                            PROCEDURE DATE:  2019          INTERPRETATION:  CT ABDOMEN AND PELVIS    CLINICAL INFORMATION:  Right lower quadrant abdominal pain.    PROCEDURE:  Using multislice helical CT, oral contrast, and following the   intravenous administration of 95 cc Omnipaque 350 , 2.5 mm sections were   obtained from the domes of the diaphragm to the ischial tuberosities.    Multiplanar MPR's were performed.    COMPARISON: CT scan abdomen and pelvis 2018.    FINDINGS:      There is fatty infiltration of the liver. No biliary ductal dilatation is   noted. The gallbladder and spleen appear unremarkable.  The adrenal glands and pancreas are unremarkable in appearance.    No hydroureteronephrosis is noted.    There is arteriosclerotic calcification of the abdominal aorta, which is   normal in caliber.  No enlarged retroperitoneal lymphadenopathy is noted.    There is sigmoid diverticulosis, with segmental bowel wall thickening of   the mid sigmoid colon in the right pelvis. There is extensive pericolic   inflammatory stranding and small bubbles of extraluminal air.  There is a 4.8 x 1.3 cm adjacent fluid collection in the right lower   quadrant.  This is immediately posterior to thetip of the appendix.     No free air is noted in the greater peritoneal cavity.    No free fluid is noted within the pelvis.    Urinary bladder appears unremarkable.    There are small bilateral fat-containing inguinal hernias.    There are multilevel degenerative changes of the spine.    Impression:    CT findings as discussed with small localized fluid collection right   lower quadrant likely secondary to sigmoid diverticulitis with localized   microperforation.  Cannot exclude tip appendicitis.  This finding was discussed with Dr. Saldaña  by telephone at the time of   interpretation on 2019.                              SHERI RUDOLPH M.D., ATTENDING RADIOLOGIST  This document has been electronically signed. 2019 11:09AM                < end of copied text > Chief Complaint:  Patient is a 63y old  Male who presents with a chief complaint of   Sleep apnea  Bakers cyst  Osteoarthrosis, localized  PTSD (post-traumatic stress disorder)  Chronic rhinosinusitis  GERD (gastroesophageal reflux disease)  Asthma  COPD (chronic obstructive pulmonary disease)  High cholesterol  Hypertension  Bakers cyst  S/P arthroscopic surgery of left knee  S/P wrist surgery  S/P tonsillectomy and adenoidectomy  S/P knee replacement, right      HPI:  64 yo white male with H/O Asthma    	Bakers cyst  Bilateral  	Chronic rhinosinusitis    	COPD (chronic obstructive pulmonary disease)    	GERD (gastroesophageal reflux disease)    	High cholesterol    	Hypertension    	Osteoarthrosis, localized    	PTSD (post-traumatic stress disorder)  2001 and   Sleep apnea  (does not use CPAP machine) who was well up until yesterday morning when he began to experience lower abdominal pain, cramp, gradual onset with associated mild diarrhea and nausea. Pain persisted and upon awakening this morning noted that pain was now present in RLQ. No appetite. No fever, chills, dysuria or hematuria.    gi consulted for diverticulitis. chart reviewed- sp egd for anemia 2017 findings of gastritis, path neg for acute. pt seen and examined. family present, reports lower abd pain (mid/rlq) for the past 2 days, prior in [facility]. pain associated w chills, nausea, non bloody vomiting and non bloody loose stools yesterday x3. vomiting/diarrhea currently resolved. denies prior episode sof diverticulitis. denies fevers/hematemesis/melena/brbpr. last egd as above. last colon ~1yr ago, +polyp sp resection, path reportedly benign and diverticulosis. no hx of abd sx. meds nc fhx- father w colon ca dxd in 60s,  in 80s from other causes. denies toxic habits    recent vs/labs/imaging reviewed- afebrile  +leukocytosis  inr 1.31  ct a/p Impression:    CT findings as discussed with small localized fluid collection right   lower quadrant likely secondary to sigmoid diverticulitis with localized   microperforation.  Cannot exclude tip appendicitis.  This finding was discussed with Dr. Saldaña  by telephone at the time of   interpretation on 2019.          latex (Rash)  No Known Drug Allergies      ALBUTerol    90 MICROgram(s) HFA Inhaler 2 Puff(s) Inhalation every 6 hours PRN  amLODIPine   Tablet 10 milliGRAM(s) Oral daily  ARIPiprazole 20 milliGRAM(s) Oral daily  buDESOnide 160 MICROgram(s)/formoterol 4.5 MICROgram(s) Inhaler 2 Puff(s) Inhalation two times a day  buPROPion  milliGRAM(s) Oral two times a day  clonazePAM Tablet 1 milliGRAM(s) Oral three times a day  fluticasone propionate 50 MICROgram(s)/spray Nasal Spray 1 Spray(s) Both Nostrils daily  pantoprazole    Tablet 40 milliGRAM(s) Oral two times a day  piperacillin/tazobactam IVPB. 3.375 Gram(s) IV Intermittent every 8 hours  sertraline 200 milliGRAM(s) Oral daily  simvastatin 40 milliGRAM(s) Oral at bedtime  tiotropium 18 MICROgram(s) Capsule 1 Capsule(s) Inhalation daily  traZODone 200 milliGRAM(s) Oral daily        FAMILY HISTORY:  No pertinent family history in first degree relatives        Review of Systems:    General:  chills  Eyes:  Good vision, no reported pain  ENT:  No sore throat, pain, runny nose, dysphagia  CV:  No pain, palpitations, no lightheadedness  Resp:  No dyspnea, cough, tachypnea, wheezing  GI: see above  :  No pain, bleeding, incontinence, nocturia  Muscle:  No pain, weakness  Neuro:  No weakness, tingling, memory problems  Psych:  No fatigue, insomnia, mood problems, depression  Endocrine:  No polyuria, polydypsia, cold/heat intolerance  Heme:  No petechiae, ecchymosis, easy bruisability  Skin:  No rash, tattoos, scars, edema    Relevant Family History:   n/c    Relevant Social History: n/c      Physical Exam:    Vital Signs:  Vital Signs Last 24 Hrs  T(C): 36.1 (2019 07:33), Max: 36.1 (2019 07:33)  T(F): 97 (2019 07:33), Max: 97 (2019 07:33)  HR: 84 (2019 09:45) (84 - 93)  BP: 128/65 (2019 09:45) (128/65 - 133/74)  BP(mean): --  RR: 16 (2019 09:45) (15 - 16)  SpO2: 96% (2019 09:45) (94% - 96%)  Daily Height in cm: 177.8 (2019 07:33)    Daily     General:  nad  HEENT:  NC/AT  Chest:  dec bs  Cardiovascular:  Regular rhythm, S1, S2  Abdomen:  Soft, ttp rlq/mid lower abd no guarding +dt pt states this is chronic and without acute change  Extremities:  no edema  Skin:  No rash  Neuro/Psych:  Awake alert responds appropriately    Laboratory:                            11.6   14.61 )-----------( 169      ( 2019 07:57 )             36.6         142  |  103  |  18  ----------------------------<  118<H>  3.8   |  29  |  1.20    Ca    8.9      2019 07:57    TPro  7.9  /  Alb  3.6  /  TBili  0.6  /  DBili  x   /  AST  21  /  ALT  32  /  AlkPhos  82      LIVER FUNCTIONS - ( 2019 07:57 )  Alb: 3.6 g/dL / Pro: 7.9 g/dL / ALK PHOS: 82 U/L / ALT: 32 U/L / AST: 21 U/L / GGT: x           PT/INR - ( 2019 07:57 )   PT: 15.0 sec;   INR: 1.31 ratio         PTT - ( 2019 07:57 )  PTT:30.4 sec  Urinalysis Basic - ( 2019 09:41 )    Color: Yellow / Appearance: Slightly Turbid / S.015 / pH: x  Gluc: x / Ketone: Trace  / Bili: Small / Urobili: 1   Blood: x / Protein: 75 mg/dL / Nitrite: Negative   Leuk Esterase: Trace / RBC: 6-10 /HPF / WBC 0-2   Sq Epi: x / Non Sq Epi: Occasional / Bacteria: x      Amylase Serum43      Lipase serum77       Ammonia--    Imaging:    < from: CT Abdomen and Pelvis w/ Oral Cont and w/ IV Cont (19 @ 10:13) >    EXAM:  CT ABDOMEN AND PELVIS OC IC                            PROCEDURE DATE:  2019          INTERPRETATION:  CT ABDOMEN AND PELVIS    CLINICAL INFORMATION:  Right lower quadrant abdominal pain.    PROCEDURE:  Using multislice helical CT, oral contrast, and following the   intravenous administration of 95 cc Omnipaque 350 , 2.5 mm sections were   obtained from the domes of the diaphragm to the ischial tuberosities.    Multiplanar MPR's were performed.    COMPARISON: CT scan abdomen and pelvis 2018.    FINDINGS:      There is fatty infiltration of the liver. No biliary ductal dilatation is   noted. The gallbladder and spleen appear unremarkable.  The adrenal glands and pancreas are unremarkable in appearance.    No hydroureteronephrosis is noted.    There is arteriosclerotic calcification of the abdominal aorta, which is   normal in caliber.  No enlarged retroperitoneal lymphadenopathy is noted.    There is sigmoid diverticulosis, with segmental bowel wall thickening of   the mid sigmoid colon in the right pelvis. There is extensive pericolic   inflammatory stranding and small bubbles of extraluminal air.  There is a 4.8 x 1.3 cm adjacent fluid collection in the right lower   quadrant.  This is immediately posterior to thetip of the appendix.     No free air is noted in the greater peritoneal cavity.    No free fluid is noted within the pelvis.    Urinary bladder appears unremarkable.    There are small bilateral fat-containing inguinal hernias.    There are multilevel degenerative changes of the spine.    Impression:    CT findings as discussed with small localized fluid collection right   lower quadrant likely secondary to sigmoid diverticulitis with localized   microperforation.  Cannot exclude tip appendicitis.  This finding was discussed with Dr. Saldaña  by telephone at the time of   interpretation on 2019.                              SHERI RUDOLPH M.D., ATTENDING RADIOLOGIST  This document has been electronically signed. 2019 11:09AM                < end of copied text >

## 2019-02-19 NOTE — ED PROVIDER NOTE - OBJECTIVE STATEMENT
62 yo white male with H/O Asthma    Bakers cyst  Bilateral  Chronic rhinosinusitis    COPD (chronic obstructive pulmonary disease)    GERD (gastroesophageal reflux disease)    High cholesterol    Hypertension    Osteoarthrosis, localized    PTSD (post-traumatic stress disorder)  September 11th 2001 and   Sleep apnea  (does not use CPAP machine) who was well up until yesterday morning when he began to experience lower abdominal pain, cramp, gradual onset with associated mild diarrhea and nausea. Pain persisted and upon awakening this morning noted that pain was now present in RLQ. No appetite. No fever, chills, dysuria or hematuria.

## 2019-02-19 NOTE — CONSULT NOTE ADULT - PROBLEM SELECTOR RECOMMENDATION 9
CT w segmental bowel wall thickening of mid sigmoid colon w inflammatory stranding, small bubbles of extraluminal air, and 4.8 x 1.3 cm adjacent fluid collection in the rlq  NPO, IVF  surgical eval  id eval  cont abx  bacid tid  pain control  monitor abd exam  trend fever curve, wbc  will need colonoscopy in 6-8 weeks  images to be reviewed w attg, will follow CT w segmental bowel wall thickening of mid sigmoid colon w inflammatory stranding, small bubbles of extraluminal air, and 4.8 x 1.3 cm adjacent fluid collection in the rlq  NPO, IVF  surgical eval  consider id eval  cont abx  bacid tid  pain control  monitor abd exam  trend fever curve, wbc  will need colonoscopy in 6-8 weeks, dw pt  case to be reviewed w attg, will follow

## 2019-02-19 NOTE — ED PROVIDER NOTE - CLINICAL SUMMARY MEDICAL DECISION MAKING FREE TEXT BOX
RLQ abdominal pain x one day requiring evaluation, labs, CT scan as well as IVFS and meds. DDx=Appy/Colitis/Diverticulitis/Obstruction

## 2019-02-19 NOTE — H&P ADULT - ASSESSMENT
63 m from home hx htn , hx  sleep apnea  night cpap prn set 4 , mood dis/ PTSD , hx copd not home bound  , hx diverticulosis , hx of bl knee replacement, c/o of  abdominal pain lt lower  side  with   nonbloody diarrhea  associated with nausea .  pt had endo and colonoscopy done 2 y ago found  to have diverticulosis , pt state no hx of gi bleed , no hx of  gastric ulcer  . pt admitted with microperforation with sigmoid diverticulitis .

## 2019-02-20 DIAGNOSIS — D64.9 ANEMIA, UNSPECIFIED: ICD-10-CM

## 2019-02-20 LAB
ANION GAP SERPL CALC-SCNC: 10 MMOL/L — SIGNIFICANT CHANGE UP (ref 5–17)
BASOPHILS # BLD AUTO: 0.02 K/UL — SIGNIFICANT CHANGE UP (ref 0–0.2)
BASOPHILS NFR BLD AUTO: 0.1 % — SIGNIFICANT CHANGE UP (ref 0–2)
BUN SERPL-MCNC: 20 MG/DL — SIGNIFICANT CHANGE UP (ref 7–23)
CALCIUM SERPL-MCNC: 7.7 MG/DL — LOW (ref 8.5–10.1)
CHLORIDE SERPL-SCNC: 102 MMOL/L — SIGNIFICANT CHANGE UP (ref 96–108)
CO2 SERPL-SCNC: 29 MMOL/L — SIGNIFICANT CHANGE UP (ref 22–31)
CREAT SERPL-MCNC: 1.3 MG/DL — SIGNIFICANT CHANGE UP (ref 0.5–1.3)
EOSINOPHIL # BLD AUTO: 0.12 K/UL — SIGNIFICANT CHANGE UP (ref 0–0.5)
EOSINOPHIL NFR BLD AUTO: 0.8 % — SIGNIFICANT CHANGE UP (ref 0–6)
GLUCOSE SERPL-MCNC: 116 MG/DL — HIGH (ref 70–99)
HCT VFR BLD CALC: 31.4 % — LOW (ref 39–50)
HCV AB S/CO SERPL IA: 0.14 S/CO — SIGNIFICANT CHANGE UP (ref 0–0.79)
HCV AB SERPL-IMP: SIGNIFICANT CHANGE UP
HGB BLD-MCNC: 9.9 G/DL — LOW (ref 13–17)
IMM GRANULOCYTES NFR BLD AUTO: 1 % — SIGNIFICANT CHANGE UP (ref 0–1.5)
LYMPHOCYTES # BLD AUTO: 1.05 K/UL — SIGNIFICANT CHANGE UP (ref 1–3.3)
LYMPHOCYTES # BLD AUTO: 7.3 % — LOW (ref 13–44)
MCHC RBC-ENTMCNC: 26.8 PG — LOW (ref 27–34)
MCHC RBC-ENTMCNC: 31.5 GM/DL — LOW (ref 32–36)
MCV RBC AUTO: 84.9 FL — SIGNIFICANT CHANGE UP (ref 80–100)
MONOCYTES # BLD AUTO: 0.79 K/UL — SIGNIFICANT CHANGE UP (ref 0–0.9)
MONOCYTES NFR BLD AUTO: 5.5 % — SIGNIFICANT CHANGE UP (ref 2–14)
NEUTROPHILS # BLD AUTO: 12.35 K/UL — HIGH (ref 1.8–7.4)
NEUTROPHILS NFR BLD AUTO: 85.3 % — HIGH (ref 43–77)
NRBC # BLD: 0 /100 WBCS — SIGNIFICANT CHANGE UP (ref 0–0)
PLATELET # BLD AUTO: 159 K/UL — SIGNIFICANT CHANGE UP (ref 150–400)
POTASSIUM SERPL-MCNC: 3.5 MMOL/L — SIGNIFICANT CHANGE UP (ref 3.5–5.3)
POTASSIUM SERPL-SCNC: 3.5 MMOL/L — SIGNIFICANT CHANGE UP (ref 3.5–5.3)
RBC # BLD: 3.7 M/UL — LOW (ref 4.2–5.8)
RBC # FLD: 17.6 % — HIGH (ref 10.3–14.5)
SODIUM SERPL-SCNC: 141 MMOL/L — SIGNIFICANT CHANGE UP (ref 135–145)
WBC # BLD: 14.48 K/UL — HIGH (ref 3.8–10.5)
WBC # FLD AUTO: 14.48 K/UL — HIGH (ref 3.8–10.5)

## 2019-02-20 PROCEDURE — 99232 SBSQ HOSP IP/OBS MODERATE 35: CPT

## 2019-02-20 PROCEDURE — 99233 SBSQ HOSP IP/OBS HIGH 50: CPT

## 2019-02-20 RX ORDER — ACETAMINOPHEN 500 MG
650 TABLET ORAL EVERY 6 HOURS
Qty: 0 | Refills: 0 | Status: DISCONTINUED | OUTPATIENT
Start: 2019-02-20 | End: 2019-02-27

## 2019-02-20 RX ORDER — ACETAMINOPHEN 500 MG
325 TABLET ORAL EVERY 4 HOURS
Qty: 0 | Refills: 0 | Status: DISCONTINUED | OUTPATIENT
Start: 2019-02-20 | End: 2019-02-20

## 2019-02-20 RX ORDER — DEXTROSE MONOHYDRATE, SODIUM CHLORIDE, AND POTASSIUM CHLORIDE 50; .745; 4.5 G/1000ML; G/1000ML; G/1000ML
1000 INJECTION, SOLUTION INTRAVENOUS
Qty: 0 | Refills: 0 | Status: DISCONTINUED | OUTPATIENT
Start: 2019-02-20 | End: 2019-02-26

## 2019-02-20 RX ADMIN — SIMVASTATIN 40 MILLIGRAM(S): 20 TABLET, FILM COATED ORAL at 22:07

## 2019-02-20 RX ADMIN — PIPERACILLIN AND TAZOBACTAM 25 GRAM(S): 4; .5 INJECTION, POWDER, LYOPHILIZED, FOR SOLUTION INTRAVENOUS at 13:24

## 2019-02-20 RX ADMIN — Medication 1 TABLET(S): at 08:06

## 2019-02-20 RX ADMIN — PIPERACILLIN AND TAZOBACTAM 25 GRAM(S): 4; .5 INJECTION, POWDER, LYOPHILIZED, FOR SOLUTION INTRAVENOUS at 22:07

## 2019-02-20 RX ADMIN — Medication 650 MILLIGRAM(S): at 16:10

## 2019-02-20 RX ADMIN — Medication 200 MILLIGRAM(S): at 11:11

## 2019-02-20 RX ADMIN — SODIUM CHLORIDE 75 MILLILITER(S): 9 INJECTION, SOLUTION INTRAVENOUS at 09:37

## 2019-02-20 RX ADMIN — TIOTROPIUM BROMIDE 1 CAPSULE(S): 18 CAPSULE ORAL; RESPIRATORY (INHALATION) at 07:22

## 2019-02-20 RX ADMIN — Medication 1 MILLIGRAM(S): at 22:11

## 2019-02-20 RX ADMIN — PANTOPRAZOLE SODIUM 40 MILLIGRAM(S): 20 TABLET, DELAYED RELEASE ORAL at 17:22

## 2019-02-20 RX ADMIN — SERTRALINE 200 MILLIGRAM(S): 25 TABLET, FILM COATED ORAL at 11:12

## 2019-02-20 RX ADMIN — Medication 1 SPRAY(S): at 11:12

## 2019-02-20 RX ADMIN — BUPROPION HYDROCHLORIDE 100 MILLIGRAM(S): 150 TABLET, EXTENDED RELEASE ORAL at 22:07

## 2019-02-20 RX ADMIN — Medication 1 TABLET(S): at 17:22

## 2019-02-20 RX ADMIN — Medication 1 TABLET(S): at 11:58

## 2019-02-20 RX ADMIN — Medication 650 MILLIGRAM(S): at 17:14

## 2019-02-20 RX ADMIN — Medication 1 MILLIGRAM(S): at 13:26

## 2019-02-20 RX ADMIN — ARIPIPRAZOLE 20 MILLIGRAM(S): 15 TABLET ORAL at 13:25

## 2019-02-20 RX ADMIN — Medication 1 MILLIGRAM(S): at 05:13

## 2019-02-20 RX ADMIN — BUDESONIDE AND FORMOTEROL FUMARATE DIHYDRATE 2 PUFF(S): 160; 4.5 AEROSOL RESPIRATORY (INHALATION) at 07:22

## 2019-02-20 RX ADMIN — AMLODIPINE BESYLATE 10 MILLIGRAM(S): 2.5 TABLET ORAL at 05:10

## 2019-02-20 RX ADMIN — BUPROPION HYDROCHLORIDE 100 MILLIGRAM(S): 150 TABLET, EXTENDED RELEASE ORAL at 08:06

## 2019-02-20 RX ADMIN — PANTOPRAZOLE SODIUM 40 MILLIGRAM(S): 20 TABLET, DELAYED RELEASE ORAL at 05:10

## 2019-02-20 RX ADMIN — PIPERACILLIN AND TAZOBACTAM 25 GRAM(S): 4; .5 INJECTION, POWDER, LYOPHILIZED, FOR SOLUTION INTRAVENOUS at 05:10

## 2019-02-20 NOTE — CONSULT NOTE ADULT - PROBLEM SELECTOR RECOMMENDATION 4
CPAP night time  device at bedside  sleep hygiene and weight management discussed  RT aware  will follow and monitor compliance

## 2019-02-20 NOTE — CONSULT NOTE ADULT - SUBJECTIVE AND OBJECTIVE BOX
Date/Time Patient Seen:  		  Referring MD:   Data Reviewed	       Patient is a 63y old  Male who presents with a chief complaint of abdominal pain , diarrhea (20 Feb 2019 11:11)      Subjective/HPI  seen and examined  on ABX  on IVF  surgery and GI follow up noted  ID leona noted    has hx of BOOKER  has hx of COPD       History and Physical:   Source of Information	Patient  Comments/Contacts	dr castaneda  / pt never saw suppose to see  as a pmd  Outpatient Providers	last pmd pt not remember name     Family History:  No pertinent family history in first degree relatives.     No Pertinent Family History in first degree relatives of: no.     Social History:  Social History (marital status, living situation, occupation, tobacco use, alcohol and drug use, and sexual history): quit smoking 30 y ago smoked 10 - 15 y ago , no drugs , no etoh , retired , 4 children , no flu shot , had endoscopy  wnl and colonoscopy 2 y ago  diagnosed with diverticulosis .     Tobacco Screening:  · Core Measure Site	Yes  · Has the patient used tobacco in the past 30 days?	No    Risk Assessment:    Present on Admission:  Deep Venous Thrombosis	no  Pulmonary Embolus	no     History of Present Illness:  Reason for Admission: abdominal pain , diarrhea  History of Present Illness:   63 m from home hx htn , hx  sleep apnea  night cpap prn set 4 , mood dis/ PTSD , hx copd not home bound  , hx diverticulosis , hx of bl knee replacement , hx gerd . pt   came to [facility]   1 day  c/o of  abdominal pain lt lower  side  with   nonbloody diarrhea  associated with nausea  but  no vomiting  , no fever  . pt said still tolerated his meal yesterday  , pt had no fever or  no other associated symptom .  pt admitted with microperforation with sigmoid diverticulitis   pt had  endo and colonoscopy done 2 y ago found  to have diverticulosis , pt state no hx gi bleed , no hx gastric ulcer  .   in ed Impression:    CT findings as discussed with small localized fluid collection right   lower quadrant likely secondary to sigmoid diverticulitis with localized   microperforation.  Cannot exclude tip appendicitis.  This finding was discussed with Dr. Saldaña  by telephone at the time of   interpretation on 2/19/2019.  PAST MEDICAL & SURGICAL HISTORY:  Sleep apnea: does not use CPAP machine  Bakers cyst: Bilateral  Osteoarthrosis, localized  PTSD (post-traumatic stress disorder): September 11th 2001  Chronic rhinosinusitis  GERD (gastroesophageal reflux disease)  Asthma  COPD (chronic obstructive pulmonary disease)  High cholesterol  Hypertension  Bakers cyst: Bilateral removal Bakers Cyst  S/P arthroscopic surgery of left knee  S/P wrist surgery: ganglion cyst  S/P tonsillectomy and adenoidectomy  S/P knee replacement, right        Medication list         MEDICATIONS  (STANDING):  amLODIPine   Tablet 10 milliGRAM(s) Oral daily  ARIPiprazole 20 milliGRAM(s) Oral daily  buDESOnide 160 MICROgram(s)/formoterol 4.5 MICROgram(s) Inhaler 2 Puff(s) Inhalation two times a day  buPROPion  milliGRAM(s) Oral two times a day  clonazePAM Tablet 1 milliGRAM(s) Oral three times a day  dextrose 5% + sodium chloride 0.9%. 1000 milliLiter(s) (75 mL/Hr) IV Continuous <Continuous>  fluticasone propionate 50 MICROgram(s)/spray Nasal Spray 1 Spray(s) Both Nostrils daily  lactobacillus acidophilus 1 Tablet(s) Oral three times a day with meals  pantoprazole    Tablet 40 milliGRAM(s) Oral two times a day  piperacillin/tazobactam IVPB. 3.375 Gram(s) IV Intermittent every 8 hours  sertraline 200 milliGRAM(s) Oral daily  simvastatin 40 milliGRAM(s) Oral at bedtime  tiotropium 18 MICROgram(s) Capsule 1 Capsule(s) Inhalation daily  traZODone 200 milliGRAM(s) Oral daily    MEDICATIONS  (PRN):  acetaminophen   Tablet .. 650 milliGRAM(s) Oral every 6 hours PRN Temp greater or equal to 38C (100.4F)  ALBUTerol    90 MICROgram(s) HFA Inhaler 2 Puff(s) Inhalation every 6 hours PRN Wheezing         Vitals log        ICU Vital Signs Last 24 Hrs  T(C): 38.2 (20 Feb 2019 15:45), Max: 38.2 (20 Feb 2019 15:45)  T(F): 100.7 (20 Feb 2019 15:45), Max: 100.7 (20 Feb 2019 15:45)  HR: 85 (20 Feb 2019 16:43) (80 - 85)  BP: 111/66 (20 Feb 2019 15:45) (106/57 - 119/64)  BP(mean): --  ABP: --  ABP(mean): --  RR: 20 (20 Feb 2019 15:45) (16 - 20)  SpO2: 93% (20 Feb 2019 16:43) (91% - 95%)           Input and Output:  I&O's Detail    19 Feb 2019 07:01  -  20 Feb 2019 07:00  --------------------------------------------------------  IN:    dextrose 5% + sodium chloride 0.9%.: 975 mL    Solution: 200 mL  Total IN: 1175 mL    OUT:    Voided: 725 mL  Total OUT: 725 mL    Total NET: 450 mL      20 Feb 2019 07:01  -  20 Feb 2019 18:34  --------------------------------------------------------  IN:    dextrose 5% + sodium chloride 0.9%.: 900 mL  Total IN: 900 mL    OUT:  Total OUT: 0 mL    Total NET: 900 mL          Lab Data                        9.9    14.48 )-----------( 159      ( 20 Feb 2019 06:25 )             31.4     02-20    141  |  102  |  20  ----------------------------<  116<H>  3.5   |  29  |  1.30    Ca    7.7<L>      20 Feb 2019 06:25    TPro  7.9  /  Alb  3.6  /  TBili  0.6  /  DBili  x   /  AST  21  /  ALT  32  /  AlkPhos  82  02-19            Review of Systems	      Objective     Physical Examination    heart s1s2  lung dec BS  abd dec BS  cn grossly int  facial hair  tattoos  on o2 support      Pertinent Lab findings & Imaging      Dino:  NO   Adequate UO     I&O's Detail    19 Feb 2019 07:01  -  20 Feb 2019 07:00  --------------------------------------------------------  IN:    dextrose 5% + sodium chloride 0.9%.: 975 mL    Solution: 200 mL  Total IN: 1175 mL    OUT:    Voided: 725 mL  Total OUT: 725 mL    Total NET: 450 mL      20 Feb 2019 07:01  -  20 Feb 2019 18:34  --------------------------------------------------------  IN:    dextrose 5% + sodium chloride 0.9%.: 900 mL  Total IN: 900 mL    OUT:  Total OUT: 0 mL    Total NET: 900 mL               Discussed with:     Cultures:	        Radiology      EXAM:  CT ABDOMEN AND PELVIS OC IC                            PROCEDURE DATE:  02/19/2019          INTERPRETATION:  CT ABDOMEN AND PELVIS    CLINICAL INFORMATION:  Right lower quadrant abdominal pain.    PROCEDURE:  Using multislice helical CT, oral contrast, and following the   intravenous administration of 95 cc Omnipaque 350 , 2.5 mm sections were   obtained from the domes of the diaphragm to the ischial tuberosities.    Multiplanar MPR's were performed.    COMPARISON: CT scan abdomen and pelvis 2/26/2018.    FINDINGS:      There is fatty infiltration of the liver. No biliary ductal dilatation is   noted. The gallbladder and spleen appear unremarkable.  The adrenal glands and pancreas are unremarkable in appearance.    No hydroureteronephrosis is noted.    There is arteriosclerotic calcification of the abdominal aorta, which is   normal in caliber.  No enlarged retroperitoneal lymphadenopathy is noted.    There is sigmoid diverticulosis, with segmental bowel wall thickening of   the mid sigmoid colon in the right pelvis. There is extensive pericolic   inflammatory stranding and small bubbles of extraluminal air.  There is a 4.8 x 1.3 cm adjacent fluid collection in the right lower   quadrant.  This is immediately posterior to the tip of the appendix.     No free air is noted in the greater peritoneal cavity.    No free fluid is noted within the pelvis.    Urinary bladder appears unremarkable.    There are small bilateral fat-containing inguinal hernias.    There are multilevel degenerative changes of the spine.    Impression:    CT findings as discussed with small localized fluid collection right   lower quadrant likely secondary to sigmoid diverticulitis with localized   microperforation.  Cannot exclude tip appendicitis.  This finding was discussed with Dr. Saldaña  by telephone at the time of   interpretation on 2/19/2019.                              SHERI RUDOLPH M.D., ATTENDING RADIOLOGIST  This document has been electronically signed. Feb 19 2019 11:09AM

## 2019-02-20 NOTE — PROGRESS NOTE ADULT - ATTENDING COMMENTS
Note written by attending, see above.  Time spent: 40min. More than 50% of the visit was spent counseling the patient on his condition - perforated acute diverticulitis, severe BOOKER, hypoxia, COPD.

## 2019-02-20 NOTE — PROGRESS NOTE ADULT - SUBJECTIVE AND OBJECTIVE BOX
HOSPITAL DAY: 1    SUBJECTIVE:  Patient seen at beside. Patient states he had 1 episode of vomiting overnight but has not had nausea or vomiting since. Patient with no complaints at this time, pain currently controlled.  Patient tolerating diet, admits to flatus and bowel movement last night. Patient denies any headaches, chest pain, shortness of breath, palpitations, nausea, vomiting, diarrhea, fevers, chills.    Vital Signs Last 24 Hrs  T(C): 37.8 (2019 07:40), Max: 37.8 (2019 07:40)  T(F): 100 (2019 07:40), Max: 100 (2019 07:40)  HR: 83 (2019 07:40) (74 - 84)  BP: 106/57 (2019 07:40) (103/58 - 130/65)  BP(mean): --  RR: 16 (2019 07:40) (16 - 20)  SpO2: 91% (2019 07:40) (91% - 96%)    PHYSICAL EXAM:  GENERAL: No acute distress, well-developed  HEAD:  Atraumatic, Normocephalic  ABDOMEN: Soft, non-tender, non-distended; normal bowel sounds  NEUROLOGY: A&O x 3, no focal deficits    I&O's Summary    2019 07:  -  2019 07:00  --------------------------------------------------------  IN: 1175 mL / OUT: 725 mL / NET: 450 mL      I&O's Detail    2019 07:01  -  2019 07:00  --------------------------------------------------------  IN:    dextrose 5% + sodium chloride 0.9%.: 975 mL    Solution: 200 mL  Total IN: 1175 mL    OUT:    Voided: 725 mL  Total OUT: 725 mL    Total NET: 450 mL        MEDICATIONS  (STANDING):  amLODIPine   Tablet 10 milliGRAM(s) Oral daily  ARIPiprazole 20 milliGRAM(s) Oral daily  buDESOnide 160 MICROgram(s)/formoterol 4.5 MICROgram(s) Inhaler 2 Puff(s) Inhalation two times a day  buPROPion  milliGRAM(s) Oral two times a day  clonazePAM Tablet 1 milliGRAM(s) Oral three times a day  dextrose 5% + sodium chloride 0.9%. 1000 milliLiter(s) (75 mL/Hr) IV Continuous <Continuous>  fluticasone propionate 50 MICROgram(s)/spray Nasal Spray 1 Spray(s) Both Nostrils daily  lactobacillus acidophilus 1 Tablet(s) Oral three times a day with meals  pantoprazole    Tablet 40 milliGRAM(s) Oral two times a day  piperacillin/tazobactam IVPB. 3.375 Gram(s) IV Intermittent every 8 hours  sertraline 200 milliGRAM(s) Oral daily  simvastatin 40 milliGRAM(s) Oral at bedtime  tiotropium 18 MICROgram(s) Capsule 1 Capsule(s) Inhalation daily  traZODone 200 milliGRAM(s) Oral daily    MEDICATIONS  (PRN):  ALBUTerol    90 MICROgram(s) HFA Inhaler 2 Puff(s) Inhalation every 6 hours PRN Wheezing    LABS:                        9.9    14.48 )-----------( 159      ( 2019 06:25 )             31.4     02-20    141  |  102  |  20  ----------------------------<  116<H>  3.5   |  29  |  1.30    Ca    7.7<L>      2019 06:25    TPro  7.9  /  Alb  3.6  /  TBili  0.6  /  DBili  x   /  AST  21  /  ALT  32  /  AlkPhos  82  02-19    PT/INR - ( 2019 07:57 )   PT: 15.0 sec;   INR: 1.31 ratio         PTT - ( 2019 07:57 )  PTT:30.4 sec  Urinalysis Basic - ( 2019 09:41 )    Color: Yellow / Appearance: Slightly Turbid / S.015 / pH: x  Gluc: x / Ketone: Trace  / Bili: Small / Urobili: 1   Blood: x / Protein: 75 mg/dL / Nitrite: Negative   Leuk Esterase: Trace / RBC: 6-10 /HPF / WBC 0-2   Sq Epi: x / Non Sq Epi: Occasional / Bacteria: x      RADIOLOGY & ADDITIONAL STUDIES:    < from: CT Abdomen and Pelvis w/ Oral Cont and w/ IV Cont (19 @ 10:13) >  EXAM:  CT ABDOMEN AND PELVIS OC IC                            PROCEDURE DATE:  2019          INTERPRETATION:  CT ABDOMEN AND PELVIS    CLINICAL INFORMATION:  Right lower quadrant abdominal pain.    PROCEDURE:  Using multislice helical CT, oral contrast, and following the   intravenous administration of 95 cc Omnipaque 350 , 2.5 mm sections were   obtained from the domes of the diaphragm to the ischial tuberosities.    Multiplanar MPR's were performed.    COMPARISON: CT scan abdomen and pelvis 2018.    FINDINGS:      There is fatty infiltration of the liver. No biliary ductal dilatation is   noted. The gallbladder and spleen appear unremarkable.  The adrenal glands and pancreas are unremarkable in appearance.    No hydroureteronephrosis is noted.    There is arteriosclerotic calcification of the abdominal aorta, which is   normal in caliber.  No enlarged retroperitoneal lymphadenopathy is noted.    There is sigmoid diverticulosis, with segmental bowel wall thickening of   the mid sigmoid colon in the right pelvis. There is extensive pericolic   inflammatory stranding and small bubbles of extraluminal air.  There is a 4.8 x 1.3 cm adjacent fluid collection in the right lower   quadrant.  This is immediately posterior to thetip of the appendix.     No free air is noted in the greater peritoneal cavity.    No free fluid is noted within the pelvis.    Urinary bladder appears unremarkable.    There are small bilateral fat-containing inguinal hernias.    There are multilevel degenerative changes of the spine.    Impression:    CT findings as discussed with small localized fluid collection right   lower quadrant likely secondary to sigmoid diverticulitis with localized   microperforation.  Cannot exclude tip appendicitis.  This finding was discussed with Dr. Saldaña  by telephone at the time of   interpretation on 2019.    < end of copied text >    ASSESSMENT    63y Male with sigmoid diverticulitis with possible microperforation currently being managed conservatively with IV abx.     PLAN  - discussed with Dr. Perez   - Conservative management with IV zosyn, if fails, will likely have to do partial colectomy with colostomy.  - trend Wc  - pain control, supportive care  - OOB  - NPO, IVF   - serial abdominal exams  - labs in am    Surgical Team Contact Information  Spectralink: Ext: [phone] or [phone]  Pager: [phone]

## 2019-02-20 NOTE — PROGRESS NOTE ADULT - SUBJECTIVE AND OBJECTIVE BOX
[facility], Division of Infectious Diseases  [place]  [phone]    Name: ELTON IVERSON  Age: 63y  Gender: Male  MRN: [id number]    Interval History--  Notes reviewed. Quiet night. Fever overnight. Denies pain. No chills or rigors. No nausea or vomiting.     Past Medical History--  Sleep apnea  Bakers cyst  Osteoarthrosis, localized  PTSD (post-traumatic stress disorder)  Chronic rhinosinusitis  GERD (gastroesophageal reflux disease)  Asthma  COPD (chronic obstructive pulmonary disease)  High cholesterol  Hypertension  Bakers cyst  S/P arthroscopic surgery of left knee  S/P wrist surgery  S/P tonsillectomy and adenoidectomy  S/P knee replacement, right      For details regarding the patient's social history, family history, and other miscellaneous elements, please refer the initial infectious diseases consultation and/or the admitting history and physical examination for this admission.    Allergies    latex (Rash)  No Known Drug Allergies    Intolerances        Medications--  Antibiotics:  piperacillin/tazobactam IVPB. 3.375 Gram(s) IV Intermittent every 8 hours    Immunologic:    Other:  ALBUTerol    90 MICROgram(s) HFA Inhaler PRN  amLODIPine   Tablet  ARIPiprazole  buDESOnide 160 MICROgram(s)/formoterol 4.5 MICROgram(s) Inhaler  buPROPion SR  clonazePAM Tablet  dextrose 5% + sodium chloride 0.9%.  fluticasone propionate 50 MICROgram(s)/spray Nasal Spray  lactobacillus acidophilus  pantoprazole    Tablet  sertraline  simvastatin  tiotropium 18 MICROgram(s) Capsule  traZODone      Review of Systems--  A 10-point review of systems was obtained.   Review of systems otherwise unchanged compared to prior visit except as previously noted.    Physical Examination--  Vital Signs: T(F): 100 (02-20-19 @ 07:40), Max: 100 (02-20-19 @ 07:40)  HR: 83 (02-20-19 @ 07:40)  BP: 106/57 (02-20-19 @ 07:40)  RR: 16 (02-20-19 @ 07:40)  SpO2: 91% (02-20-19 @ 07:40)  Wt(kg): --  General: Nontoxic-appearing Male in no acute distress.  HEENT: AT/NC. Anicteric. Conjunctiva pink and moist. Oropharynx clear.  Neck: Not rigid. No sense of mass.  Nodes: None palpable.  Lungs: Diminished breath sounds bilaterally without rales, wheezing or rhonchi  Heart: Regular rate and rhythm. No Murmur. No rub. No gallop. No palpable thrill.  Abdomen: Bowel sounds present and normoactive. Soft. Nondistended. Nontender. No sense of mass. No organomegaly. Obese.   Extremities: No cyanosis or clubbing. No edema.   Skin: Warm. Dry. Good turgor. No rash. No vasculitic stigmata.  Psychiatric: Appropriate affect and mood for situation.       Laboratory Studies--  CBC                        9.9    14.48 )-----------( 159      ( 20 Feb 2019 06:25 )             31.4     WBC Count: 14.61 K/uL (02.19.19 @ 07:57)      Chemistries  02-20    141  |  102  |  20  ----------------------------<  116<H>  3.5   |  29  |  1.30    Ca    7.7<L>      20 Feb 2019 06:25    TPro  7.9  /  Alb  3.6  /  TBili  0.6  /  DBili  x   /  AST  21  /  ALT  32  /  AlkPhos  82  02-19      Culture Data  No culture data

## 2019-02-20 NOTE — PROGRESS NOTE ADULT - SUBJECTIVE AND OBJECTIVE BOX
INTERVAL HPI/OVERNIGHT EVENTS:  pt seen and examined  denies n/v  states abd pain slightly better  passed loose nb bm last night  afebrile overnight low grade temp this am labs noted    MEDICATIONS  (STANDING):  amLODIPine   Tablet 10 milliGRAM(s) Oral daily  ARIPiprazole 20 milliGRAM(s) Oral daily  buDESOnide 160 MICROgram(s)/formoterol 4.5 MICROgram(s) Inhaler 2 Puff(s) Inhalation two times a day  buPROPion  milliGRAM(s) Oral two times a day  clonazePAM Tablet 1 milliGRAM(s) Oral three times a day  dextrose 5% + sodium chloride 0.9%. 1000 milliLiter(s) (75 mL/Hr) IV Continuous <Continuous>  fluticasone propionate 50 MICROgram(s)/spray Nasal Spray 1 Spray(s) Both Nostrils daily  lactobacillus acidophilus 1 Tablet(s) Oral three times a day with meals  pantoprazole    Tablet 40 milliGRAM(s) Oral two times a day  piperacillin/tazobactam IVPB. 3.375 Gram(s) IV Intermittent every 8 hours  sertraline 200 milliGRAM(s) Oral daily  simvastatin 40 milliGRAM(s) Oral at bedtime  tiotropium 18 MICROgram(s) Capsule 1 Capsule(s) Inhalation daily  traZODone 200 milliGRAM(s) Oral daily    MEDICATIONS  (PRN):  ALBUTerol    90 MICROgram(s) HFA Inhaler 2 Puff(s) Inhalation every 6 hours PRN Wheezing      Allergies    latex (Rash)  No Known Drug Allergies    Intolerances        Review of Systems:    General:  No wt loss, fevers, chills, night sweats, fatigue   Eyes:  Good vision, no reported pain  ENT:  No sore throat, pain, runny nose, dysphagia  CV:  No pain, palpitations, hypo/hypertension  Resp:  No dyspnea, cough, tachypnea, wheezing  GI:  +pain, No nausea, No vomiting, No diarrhea, No constipation, No weight loss, No fever, No pruritis, No rectal bleeding, No melena, No dysphagia  :  No pain, bleeding, incontinence, nocturia  Muscle:  No pain, weakness  Neuro:  No weakness, tingling, memory problems  Psych:  No fatigue, insomnia, mood problems, depression  Endocrine:  No polyuria, polydypsia, cold/heat intolerance  Heme:  No petechiae, ecchymosis, easy bruisability  Skin:  No rash, tattoos, scars, edema      Vital Signs Last 24 Hrs  T(C): 37.8 (2019 07:40), Max: 37.8 (2019 07:40)  T(F): 100 (2019 07:40), Max: 100 (2019 07:40)  HR: 83 (2019 07:40) (74 - 84)  BP: 106/57 (2019 07:40) (103/58 - 130/65)  BP(mean): --  RR: 16 (2019 07:40) (16 - 20)  SpO2: 91% (2019 07:40) (91% - 96%)    PHYSICAL EXAM:    General:  nad  HEENT:  NC/AT  Chest:  dec bs  Cardiovascular:  Regular rhythm, S1, S2  Abdomen:  Soft, ttp rlq no guarding +dt   Extremities:  no edema  Skin:  No rash  Neuro/Psych:  Awake alert responds appropriately      LABS:                        9.9    14.48 )-----------( 159      ( 2019 06:25 )             31.4     02-20    141  |  102  |  20  ----------------------------<  116<H>  3.5   |  29  |  1.30    Ca    7.7<L>      2019 06:25    TPro  7.9  /  Alb  3.6  /  TBili  0.6  /  DBili  x   /  AST  21  /  ALT  32  /  AlkPhos  82  02-19    PT/INR - ( 2019 07:57 )   PT: 15.0 sec;   INR: 1.31 ratio         PTT - ( 2019 07:57 )  PTT:30.4 sec  Urinalysis Basic - ( 2019 09:41 )    Color: Yellow / Appearance: Slightly Turbid / S.015 / pH: x  Gluc: x / Ketone: Trace  / Bili: Small / Urobili: 1   Blood: x / Protein: 75 mg/dL / Nitrite: Negative   Leuk Esterase: Trace / RBC: 6-10 /HPF / WBC 0-2   Sq Epi: x / Non Sq Epi: Occasional / Bacteria: x        RADIOLOGY & ADDITIONAL TESTS:

## 2019-02-20 NOTE — CONSULT NOTE ADULT - PROBLEM SELECTOR RECOMMENDATION 3
colitis  poss intestinal perf  ABX  IVF  NPO  surgery and ID following  pain assessment  caution with opioids - pt with BOOKER

## 2019-02-20 NOTE — CONSULT NOTE ADULT - CONSULT REASON
Diverticulitis
diverticulitis
sob  fregoso  abd pain  brandon  obesity  atelectasis
Diverticulitis vs Appendicitis

## 2019-02-21 LAB
ANION GAP SERPL CALC-SCNC: 4 MMOL/L — LOW (ref 5–17)
BUN SERPL-MCNC: 15 MG/DL — SIGNIFICANT CHANGE UP (ref 7–23)
CALCIUM SERPL-MCNC: 7.9 MG/DL — LOW (ref 8.5–10.1)
CHLORIDE SERPL-SCNC: 106 MMOL/L — SIGNIFICANT CHANGE UP (ref 96–108)
CO2 SERPL-SCNC: 31 MMOL/L — SIGNIFICANT CHANGE UP (ref 22–31)
CREAT SERPL-MCNC: 0.99 MG/DL — SIGNIFICANT CHANGE UP (ref 0.5–1.3)
GLUCOSE SERPL-MCNC: 134 MG/DL — HIGH (ref 70–99)
HCT VFR BLD CALC: 29.8 % — LOW (ref 39–50)
HGB BLD-MCNC: 9.3 G/DL — LOW (ref 13–17)
MAGNESIUM SERPL-MCNC: 2.4 MG/DL — SIGNIFICANT CHANGE UP (ref 1.6–2.6)
MCHC RBC-ENTMCNC: 26.6 PG — LOW (ref 27–34)
MCHC RBC-ENTMCNC: 31.2 GM/DL — LOW (ref 32–36)
MCV RBC AUTO: 85.4 FL — SIGNIFICANT CHANGE UP (ref 80–100)
NRBC # BLD: 0 /100 WBCS — SIGNIFICANT CHANGE UP (ref 0–0)
PHOSPHATE SERPL-MCNC: 1.7 MG/DL — LOW (ref 2.5–4.5)
PLATELET # BLD AUTO: 175 K/UL — SIGNIFICANT CHANGE UP (ref 150–400)
POTASSIUM SERPL-MCNC: 3.4 MMOL/L — LOW (ref 3.5–5.3)
POTASSIUM SERPL-SCNC: 3.4 MMOL/L — LOW (ref 3.5–5.3)
RBC # BLD: 3.49 M/UL — LOW (ref 4.2–5.8)
RBC # FLD: 17.1 % — HIGH (ref 10.3–14.5)
SODIUM SERPL-SCNC: 141 MMOL/L — SIGNIFICANT CHANGE UP (ref 135–145)
WBC # BLD: 10.72 K/UL — HIGH (ref 3.8–10.5)
WBC # FLD AUTO: 10.72 K/UL — HIGH (ref 3.8–10.5)

## 2019-02-21 PROCEDURE — 99233 SBSQ HOSP IP/OBS HIGH 50: CPT

## 2019-02-21 PROCEDURE — 99232 SBSQ HOSP IP/OBS MODERATE 35: CPT

## 2019-02-21 RX ORDER — CHOLESTYRAMINE 4 G/9G
4 POWDER, FOR SUSPENSION ORAL DAILY
Qty: 0 | Refills: 0 | Status: DISCONTINUED | OUTPATIENT
Start: 2019-02-21 | End: 2019-02-25

## 2019-02-21 RX ORDER — IOHEXOL 300 MG/ML
30 INJECTION, SOLUTION INTRAVENOUS ONCE
Qty: 0 | Refills: 0 | Status: DISCONTINUED | OUTPATIENT
Start: 2019-02-22 | End: 2019-02-22

## 2019-02-21 RX ORDER — POTASSIUM CHLORIDE 20 MEQ
40 PACKET (EA) ORAL ONCE
Qty: 0 | Refills: 0 | Status: COMPLETED | OUTPATIENT
Start: 2019-02-21 | End: 2019-02-21

## 2019-02-21 RX ORDER — HEPARIN SODIUM 5000 [USP'U]/ML
5000 INJECTION INTRAVENOUS; SUBCUTANEOUS EVERY 8 HOURS
Qty: 0 | Refills: 0 | Status: DISCONTINUED | OUTPATIENT
Start: 2019-02-21 | End: 2019-02-27

## 2019-02-21 RX ORDER — POTASSIUM PHOSPHATE, MONOBASIC POTASSIUM PHOSPHATE, DIBASIC 236; 224 MG/ML; MG/ML
15 INJECTION, SOLUTION INTRAVENOUS ONCE
Qty: 0 | Refills: 0 | Status: COMPLETED | OUTPATIENT
Start: 2019-02-21 | End: 2019-02-21

## 2019-02-21 RX ADMIN — PIPERACILLIN AND TAZOBACTAM 25 GRAM(S): 4; .5 INJECTION, POWDER, LYOPHILIZED, FOR SOLUTION INTRAVENOUS at 21:19

## 2019-02-21 RX ADMIN — BUPROPION HYDROCHLORIDE 100 MILLIGRAM(S): 150 TABLET, EXTENDED RELEASE ORAL at 08:25

## 2019-02-21 RX ADMIN — PIPERACILLIN AND TAZOBACTAM 25 GRAM(S): 4; .5 INJECTION, POWDER, LYOPHILIZED, FOR SOLUTION INTRAVENOUS at 05:12

## 2019-02-21 RX ADMIN — Medication 200 MILLIGRAM(S): at 21:19

## 2019-02-21 RX ADMIN — POTASSIUM PHOSPHATE, MONOBASIC POTASSIUM PHOSPHATE, DIBASIC 62.5 MILLIMOLE(S): 236; 224 INJECTION, SOLUTION INTRAVENOUS at 17:58

## 2019-02-21 RX ADMIN — Medication 1 TABLET(S): at 13:04

## 2019-02-21 RX ADMIN — BUPROPION HYDROCHLORIDE 100 MILLIGRAM(S): 150 TABLET, EXTENDED RELEASE ORAL at 15:01

## 2019-02-21 RX ADMIN — Medication 1 MILLIGRAM(S): at 05:18

## 2019-02-21 RX ADMIN — Medication 1 SPRAY(S): at 13:06

## 2019-02-21 RX ADMIN — PIPERACILLIN AND TAZOBACTAM 25 GRAM(S): 4; .5 INJECTION, POWDER, LYOPHILIZED, FOR SOLUTION INTRAVENOUS at 13:10

## 2019-02-21 RX ADMIN — BUDESONIDE AND FORMOTEROL FUMARATE DIHYDRATE 2 PUFF(S): 160; 4.5 AEROSOL RESPIRATORY (INHALATION) at 06:37

## 2019-02-21 RX ADMIN — TIOTROPIUM BROMIDE 1 CAPSULE(S): 18 CAPSULE ORAL; RESPIRATORY (INHALATION) at 06:38

## 2019-02-21 RX ADMIN — Medication 1 TABLET(S): at 17:58

## 2019-02-21 RX ADMIN — HEPARIN SODIUM 5000 UNIT(S): 5000 INJECTION INTRAVENOUS; SUBCUTANEOUS at 21:19

## 2019-02-21 RX ADMIN — AMLODIPINE BESYLATE 10 MILLIGRAM(S): 2.5 TABLET ORAL at 05:11

## 2019-02-21 RX ADMIN — BUDESONIDE AND FORMOTEROL FUMARATE DIHYDRATE 2 PUFF(S): 160; 4.5 AEROSOL RESPIRATORY (INHALATION) at 21:18

## 2019-02-21 RX ADMIN — SERTRALINE 200 MILLIGRAM(S): 25 TABLET, FILM COATED ORAL at 13:05

## 2019-02-21 RX ADMIN — Medication 1 MILLIGRAM(S): at 15:01

## 2019-02-21 RX ADMIN — PANTOPRAZOLE SODIUM 40 MILLIGRAM(S): 20 TABLET, DELAYED RELEASE ORAL at 05:12

## 2019-02-21 RX ADMIN — SIMVASTATIN 40 MILLIGRAM(S): 20 TABLET, FILM COATED ORAL at 21:19

## 2019-02-21 RX ADMIN — Medication 1 MILLIGRAM(S): at 21:19

## 2019-02-21 RX ADMIN — Medication 40 MILLIEQUIVALENT(S): at 09:39

## 2019-02-21 RX ADMIN — PANTOPRAZOLE SODIUM 40 MILLIGRAM(S): 20 TABLET, DELAYED RELEASE ORAL at 17:58

## 2019-02-21 RX ADMIN — ARIPIPRAZOLE 15 MILLIGRAM(S): 15 TABLET ORAL at 13:06

## 2019-02-21 RX ADMIN — Medication 1 TABLET(S): at 08:25

## 2019-02-21 NOTE — PROGRESS NOTE ADULT - SUBJECTIVE AND OBJECTIVE BOX
Patient is a 63y old  Male who presents with a chief complaint of abdominal pain , diarrhea.      INTERVAL HPI/OVERNIGHT EVENTS: Pt states he still has loose brown BMs ~3/day. Abd pain improving. Denies fever, chills, SOB, CP, palpitations.     MEDICATIONS  (STANDING):  amLODIPine   Tablet 10 milliGRAM(s) Oral daily  ARIPiprazole 20 milliGRAM(s) Oral daily  buDESOnide 160 MICROgram(s)/formoterol 4.5 MICROgram(s) Inhaler 2 Puff(s) Inhalation two times a day  buPROPion  milliGRAM(s) Oral two times a day  cholestyramine Powder (Sugar-Free) 4 Gram(s) Oral daily  clonazePAM Tablet 1 milliGRAM(s) Oral three times a day  dextrose 5% + sodium chloride 0.45% with potassium chloride 20 mEq/L 1000 milliLiter(s) (75 mL/Hr) IV Continuous <Continuous>  fluticasone propionate 50 MICROgram(s)/spray Nasal Spray 1 Spray(s) Both Nostrils daily  heparin  Injectable 5000 Unit(s) SubCutaneous every 8 hours  iohexol 300 mG (iodine)/mL Oral Solution 30 milliLiter(s) Oral once  lactobacillus acidophilus 1 Tablet(s) Oral three times a day with meals  pantoprazole    Tablet 40 milliGRAM(s) Oral two times a day  piperacillin/tazobactam IVPB. 3.375 Gram(s) IV Intermittent every 8 hours  sertraline 200 milliGRAM(s) Oral daily  simvastatin 40 milliGRAM(s) Oral at bedtime  tiotropium 18 MICROgram(s) Capsule 1 Capsule(s) Inhalation daily  traZODone 200 milliGRAM(s) Oral daily    MEDICATIONS  (PRN):  acetaminophen   Tablet .. 650 milliGRAM(s) Oral every 6 hours PRN Temp greater or equal to 38C (100.4F)  ALBUTerol    90 MICROgram(s) HFA Inhaler 2 Puff(s) Inhalation every 6 hours PRN Wheezing      Allergies    latex (Rash)  No Known Drug Allergies    Intolerances        REVIEW OF SYSTEMS:  CONSTITUTIONAL: resolved fever ; denies chills  HEENT:  No headache, no sore throat  RESPIRATORY: No cough, wheezing, or shortness of breath  CARDIOVASCULAR: No chest pain, palpitations  GASTROINTESTINAL: improving abd pain, Reports having 3 loose, brown BMs per day; No nausea, vomiting  GENITOURINARY: No dysuria, frequency, or hematuria  NEUROLOGICAL: no focal weakness or dizziness  MUSCULOSKELETAL: no myalgias      Vital Signs Last 24 Hrs  T(C): 37 (21 Feb 2019 08:17), Max: 38.2 (20 Feb 2019 15:45)  T(F): 98.6 (21 Feb 2019 08:17), Max: 100.7 (20 Feb 2019 15:45)  HR: 75 (21 Feb 2019 08:17) (75 - 90)  BP: 132/76 (21 Feb 2019 08:17) (109/71 - 132/76)  BP(mean): --  RR: 18 (21 Feb 2019 08:17) (16 - 20)  SpO2: 91% (21 Feb 2019 08:17) (91% - 96%)    PHYSICAL EXAM:  GENERAL: NAD, obese, lying flat on his back at 20 degree angle on bed without any difficulty breathing  HEENT:  anicteric, moist mucous membranes  CHEST/LUNG:  diminished breath sounds in general but otherwise CTA b/l , no rales, wheezes, or rhonchi  HEART:  RRR, S1, S2  ABDOMEN:  BS+, soft, lower abd tenderness without guarding, nondistended  EXTREMITIES: no edema, cyanosis, or calf tenderness  NERVOUS SYSTEM: answers questions and follows commands appropriately    LABS:                        9.3    10.72 )-----------( 175      ( 21 Feb 2019 07:10 )             29.8     CBC Full  -  ( 21 Feb 2019 07:10 )  WBC Count : 10.72 K/uL  Hemoglobin : 9.3 g/dL  Hematocrit : 29.8 %  Platelet Count - Automated : 175 K/uL  Mean Cell Volume : 85.4 fl  Mean Cell Hemoglobin : 26.6 pg  Mean Cell Hemoglobin Concentration : 31.2 gm/dL  Auto Neutrophil # : x  Auto Lymphocyte # : x  Auto Monocyte # : x  Auto Eosinophil # : x  Auto Basophil # : x  Auto Neutrophil % : x  Auto Lymphocyte % : x  Auto Monocyte % : x  Auto Eosinophil % : x  Auto Basophil % : x    21 Feb 2019 07:10    141    |  106    |  15     ----------------------------<  134    3.4     |  31     |  0.99     Ca    7.9        21 Feb 2019 07:10  Phos  1.7       21 Feb 2019 07:10  Mg     2.4       21 Feb 2019 07:10          CAPILLARY BLOOD GLUCOSE            Culture - Blood (collected 02-19-19 @ 19:27)  Source: .Blood Blood  Preliminary Report (02-20-19 @ 20:00):    No growth to date.    Culture - Blood (collected 02-19-19 @ 19:27)  Source: .Blood Blood  Preliminary Report (02-20-19 @ 20:00):    No growth to date.        RADIOLOGY & ADDITIONAL TESTS:    Personally reviewed.     Consultant(s) Notes Reviewed:  [x] YES  [ ] NO Patient is a 63y old  Male who presents with a chief complaint of abdominal pain , diarrhea.    INTERVAL HPI/OVERNIGHT EVENTS: Pt states he still has loose brown BMs ~3/day. Abd pain improving. Denies fever, chills, SOB, CP, palpitations.     MEDICATIONS  (STANDING):  amLODIPine   Tablet 10 milliGRAM(s) Oral daily  ARIPiprazole 20 milliGRAM(s) Oral daily  buDESOnide 160 MICROgram(s)/formoterol 4.5 MICROgram(s) Inhaler 2 Puff(s) Inhalation two times a day  buPROPion  milliGRAM(s) Oral two times a day  cholestyramine Powder (Sugar-Free) 4 Gram(s) Oral daily  clonazePAM Tablet 1 milliGRAM(s) Oral three times a day  dextrose 5% + sodium chloride 0.45% with potassium chloride 20 mEq/L 1000 milliLiter(s) (75 mL/Hr) IV Continuous <Continuous>  fluticasone propionate 50 MICROgram(s)/spray Nasal Spray 1 Spray(s) Both Nostrils daily  heparin  Injectable 5000 Unit(s) SubCutaneous every 8 hours  iohexol 300 mG (iodine)/mL Oral Solution 30 milliLiter(s) Oral once  lactobacillus acidophilus 1 Tablet(s) Oral three times a day with meals  pantoprazole    Tablet 40 milliGRAM(s) Oral two times a day  piperacillin/tazobactam IVPB. 3.375 Gram(s) IV Intermittent every 8 hours  sertraline 200 milliGRAM(s) Oral daily  simvastatin 40 milliGRAM(s) Oral at bedtime  tiotropium 18 MICROgram(s) Capsule 1 Capsule(s) Inhalation daily  traZODone 200 milliGRAM(s) Oral daily    MEDICATIONS  (PRN):  acetaminophen   Tablet .. 650 milliGRAM(s) Oral every 6 hours PRN Temp greater or equal to 38C (100.4F)  ALBUTerol    90 MICROgram(s) HFA Inhaler 2 Puff(s) Inhalation every 6 hours PRN Wheezing      Allergies    latex (Rash)  No Known Drug Allergies    Intolerances        REVIEW OF SYSTEMS:  CONSTITUTIONAL: resolved fever ; denies chills  HEENT:  No headache, no sore throat  RESPIRATORY: No cough, wheezing, or shortness of breath  CARDIOVASCULAR: No chest pain, palpitations  GASTROINTESTINAL: improving abd pain, Reports having 3 loose, brown BMs per day; No nausea, vomiting  GENITOURINARY: No dysuria, frequency, or hematuria  NEUROLOGICAL: no focal weakness or dizziness  MUSCULOSKELETAL: no myalgias      Vital Signs Last 24 Hrs  T(C): 37 (21 Feb 2019 08:17), Max: 38.2 (20 Feb 2019 15:45)  T(F): 98.6 (21 Feb 2019 08:17), Max: 100.7 (20 Feb 2019 15:45)  HR: 75 (21 Feb 2019 08:17) (75 - 90)  BP: 132/76 (21 Feb 2019 08:17) (109/71 - 132/76)  BP(mean): --  RR: 18 (21 Feb 2019 08:17) (16 - 20)  SpO2: 91% (21 Feb 2019 08:17) (91% - 96%)    PHYSICAL EXAM:  GENERAL: NAD, obese, lying flat on his back at 20 degree angle on bed without any difficulty breathing  HEENT:  anicteric, moist mucous membranes  CHEST/LUNG:  diminished breath sounds in general but otherwise CTA b/l , no rales, wheezes, or rhonchi  HEART:  RRR, S1, S2  ABDOMEN:  BS+, soft, lower abd tenderness without guarding, nondistended  EXTREMITIES: no edema, cyanosis, or calf tenderness  NERVOUS SYSTEM: answers questions and follows commands appropriately    LABS:                        9.3    10.72 )-----------( 175      ( 21 Feb 2019 07:10 )             29.8     CBC Full  -  ( 21 Feb 2019 07:10 )  WBC Count : 10.72 K/uL  Hemoglobin : 9.3 g/dL  Hematocrit : 29.8 %  Platelet Count - Automated : 175 K/uL  Mean Cell Volume : 85.4 fl  Mean Cell Hemoglobin : 26.6 pg  Mean Cell Hemoglobin Concentration : 31.2 gm/dL  Auto Neutrophil # : x  Auto Lymphocyte # : x  Auto Monocyte # : x  Auto Eosinophil # : x  Auto Basophil # : x  Auto Neutrophil % : x  Auto Lymphocyte % : x  Auto Monocyte % : x  Auto Eosinophil % : x  Auto Basophil % : x    21 Feb 2019 07:10    141    |  106    |  15     ----------------------------<  134    3.4     |  31     |  0.99     Ca    7.9        21 Feb 2019 07:10  Phos  1.7       21 Feb 2019 07:10  Mg     2.4       21 Feb 2019 07:10          CAPILLARY BLOOD GLUCOSE            Culture - Blood (collected 02-19-19 @ 19:27)  Source: .Blood Blood  Preliminary Report (02-20-19 @ 20:00):    No growth to date.    Culture - Blood (collected 02-19-19 @ 19:27)  Source: .Blood Blood  Preliminary Report (02-20-19 @ 20:00):    No growth to date.        RADIOLOGY & ADDITIONAL TESTS:    Personally reviewed.     Consultant(s) Notes Reviewed:  [x] YES  [ ] NO

## 2019-02-21 NOTE — PROGRESS NOTE ADULT - SUBJECTIVE AND OBJECTIVE BOX
[facility], Division of Infectious Diseases  [place]  [phone]    Name: ELTON IVERSON  Age: 63y  Gender: Male  MRN: [id number]    Interval History--  Notes reviewed. Feels ok. Still w RLQ pain. No fevers, chills, or rigors.     Past Medical History--  Sleep apnea  Bakers cyst  Osteoarthrosis, localized  PTSD (post-traumatic stress disorder)  Chronic rhinosinusitis  GERD (gastroesophageal reflux disease)  Asthma  COPD (chronic obstructive pulmonary disease)  High cholesterol  Hypertension  Bakers cyst  S/P arthroscopic surgery of left knee  S/P wrist surgery  S/P tonsillectomy and adenoidectomy  S/P knee replacement, right      For details regarding the patient's social history, family history, and other miscellaneous elements, please refer the initial infectious diseases consultation and/or the admitting history and physical examination for this admission.    Allergies    latex (Rash)  No Known Drug Allergies    Intolerances        Medications--  Antibiotics:  piperacillin/tazobactam IVPB. 3.375 Gram(s) IV Intermittent every 8 hours    Immunologic:    Other:  acetaminophen   Tablet .. PRN  ALBUTerol    90 MICROgram(s) HFA Inhaler PRN  amLODIPine   Tablet  ARIPiprazole  buDESOnide 160 MICROgram(s)/formoterol 4.5 MICROgram(s) Inhaler  buPROPion SR  clonazePAM Tablet  dextrose 5% + sodium chloride 0.45% with potassium chloride 20 mEq/L  fluticasone propionate 50 MICROgram(s)/spray Nasal Spray  lactobacillus acidophilus  pantoprazole    Tablet  sertraline  simvastatin  tiotropium 18 MICROgram(s) Capsule  traZODone      Review of Systems--  A 10-point review of systems was obtained.   Review of systems otherwise unchanged compared to prior visit except as previously noted.    Physical Examination--  Vital Signs: T(F): 98.6 (02-21-19 @ 08:17), Max: 100.7 (02-20-19 @ 15:45)  HR: 75 (02-21-19 @ 08:17)  BP: 132/76 (02-21-19 @ 08:17)  RR: 18 (02-21-19 @ 08:17)  SpO2: 91% (02-21-19 @ 08:17)  Wt(kg): --  General: Nontoxic-appearing Male in no acute distress.  HEENT: AT/NC. Anicteric. Conjunctiva pink and moist. Oropharynx clear.  Neck: Not rigid. No sense of mass.  Nodes: None palpable.  Lungs: Diminished breath sounds bilaterally without rales, wheezing or rhonchi  Heart: Regular rate and rhythm. No Murmur. No rub. No gallop. No palpable thrill.  Abdomen: Bowel sounds present and normoactive. Soft. Nondistended. Nontender. No sense of mass. No organomegaly. Obese.   Extremities: No cyanosis or clubbing. No edema.   Skin: Warm. Dry. Good turgor. No rash. No vasculitic stigmata.  Psychiatric: Appropriate affect and mood for situation.       Laboratory Studies--  CBC                        9.3    10.72 )-----------( 175      ( 21 Feb 2019 07:10 )             29.8       Chemistries  02-21    141  |  106  |  15  ----------------------------<  134<H>  3.4<L>   |  31  |  0.99    Ca    7.9<L>      21 Feb 2019 07:10  Phos  1.7     02-21  Mg     2.4     02-21        Culture Data    Culture - Blood (collected 19 Feb 2019 19:27)  Source: .Blood Blood  Preliminary Report (20 Feb 2019 20:00):    No growth to date.    Culture - Blood (collected 19 Feb 2019 19:27)  Source: .Blood Blood  Preliminary Report (20 Feb 2019 20:00):    No growth to date.

## 2019-02-21 NOTE — PROGRESS NOTE ADULT - SUBJECTIVE AND OBJECTIVE BOX
Pt seen and examined. Pt denies any overnight events. Pt reports pain is well controlled. Pt reports passing flatus and having loose watery bowel movements. Pt denies any nausea/vomiting. Pt reports ambulating without assistance. Remains NPO with IVF. Tolerating IV Zosyn.    Vital Signs Last 24 Hrs  T(C): 37 (2019 08:17), Max: 38.2 (2019 15:45)  T(F): 98.6 (2019 08:17), Max: 100.7 (2019 15:45)  HR: 75 (2019 08:17) (75 - 90)  BP: 132/76 (2019 08:17) (109/71 - 132/76)  BP(mean): --  RR: 18 (2019 08:17) (16 - 20)  SpO2: 91% (2019 08:17) (91% - 96%)      CT findings as discussed with small localized fluid collection right   lower quadrant likely secondary to sigmoid diverticulitis with localized   microperforation. Cannot exclude tip appendicitis.                        9.3    10.72 )-----------( 175      ( 2019 07:10 )             29.8           02-21    141  |  106  |  15  ----------------------------<  134<H>  3.4<L>   |  31  |  0.99    Ca    7.9<L>      2019 07:10  Phos  1.7       Mg     2.4     -21    Urinalysis Basic - ( 2019 09:41 )    Color: Yellow / Appearance: Slightly Turbid / S.015 / pH: x  Gluc: x / Ketone: Trace  / Bili: Small / Urobili: 1   Blood: x / Protein: 75 mg/dL / Nitrite: Negative   Leuk Esterase: Trace / RBC: 6-10 /HPF / WBC 0-2   Sq Epi: x / Non Sq Epi: Occasional / Bacteria: x    Exam: Pt seen, sitting comfortably in bed in no distress.  Chest: CTA bilaterally, no rales, no ronchi  CV- S1&S2, RRR  Abd- Soft, distended with RLQ ttp, non-tender, (+) BS  Incision- clean, dry and intact    A/P S/P POD# HEALTH ISSUES - PROBLEM Dx:  Anemia: Anemia  Prophylactic measure: Prophylactic measure  PTSD (post-traumatic stress disorder): PTSD (post-traumatic stress disorder)  COPD (chronic obstructive pulmonary disease): COPD (chronic obstructive pulmonary disease)  Sleep apnea: Sleep apnea  Hypertension: Hypertension  GERD (gastroesophageal reflux disease): GERD (gastroesophageal reflux disease)  Diverticulitis: Diverticulitis    1) Continue analgesia  2) Continue DVT prophylaxis with SCD's, consider Lovenox or SQH  3) Continue IV antibiotics- Zosyn  4) OOB, ambulate with assistance  5) NPO w/IVF  6) Appreciate ID input  7) Continue CPAP

## 2019-02-21 NOTE — PROGRESS NOTE ADULT - SUBJECTIVE AND OBJECTIVE BOX
Date/Time Patient Seen:  		  Referring MD:   Data Reviewed	       Patient is a 63y old  Male who presents with a chief complaint of abdominal pain , diarrhea (20 Feb 2019 18:34)      Subjective/HPI     PAST MEDICAL & SURGICAL HISTORY:  Sleep apnea: does not use CPAP machine  Bakers cyst: Bilateral  Osteoarthrosis, localized  PTSD (post-traumatic stress disorder): September 11th 2001  Chronic rhinosinusitis  GERD (gastroesophageal reflux disease)  Asthma  COPD (chronic obstructive pulmonary disease)  High cholesterol  Hypertension  Bakers cyst: Bilateral removal Bakers Cyst  S/P arthroscopic surgery of left knee  S/P wrist surgery: ganglion cyst  S/P tonsillectomy and adenoidectomy  S/P knee replacement, right        Medication list         MEDICATIONS  (STANDING):  amLODIPine   Tablet 10 milliGRAM(s) Oral daily  ARIPiprazole 20 milliGRAM(s) Oral daily  buDESOnide 160 MICROgram(s)/formoterol 4.5 MICROgram(s) Inhaler 2 Puff(s) Inhalation two times a day  buPROPion  milliGRAM(s) Oral two times a day  clonazePAM Tablet 1 milliGRAM(s) Oral three times a day  dextrose 5% + sodium chloride 0.45% with potassium chloride 20 mEq/L 1000 milliLiter(s) (75 mL/Hr) IV Continuous <Continuous>  fluticasone propionate 50 MICROgram(s)/spray Nasal Spray 1 Spray(s) Both Nostrils daily  lactobacillus acidophilus 1 Tablet(s) Oral three times a day with meals  pantoprazole    Tablet 40 milliGRAM(s) Oral two times a day  piperacillin/tazobactam IVPB. 3.375 Gram(s) IV Intermittent every 8 hours  sertraline 200 milliGRAM(s) Oral daily  simvastatin 40 milliGRAM(s) Oral at bedtime  tiotropium 18 MICROgram(s) Capsule 1 Capsule(s) Inhalation daily  traZODone 200 milliGRAM(s) Oral daily    MEDICATIONS  (PRN):  acetaminophen   Tablet .. 650 milliGRAM(s) Oral every 6 hours PRN Temp greater or equal to 38C (100.4F)  ALBUTerol    90 MICROgram(s) HFA Inhaler 2 Puff(s) Inhalation every 6 hours PRN Wheezing         Vitals log        ICU Vital Signs Last 24 Hrs  T(C): 36.9 (21 Feb 2019 04:35), Max: 38.2 (20 Feb 2019 15:45)  T(F): 98.4 (21 Feb 2019 04:35), Max: 100.7 (20 Feb 2019 15:45)  HR: 78 (21 Feb 2019 04:38) (78 - 90)  BP: 112/74 (21 Feb 2019 04:35) (106/57 - 112/74)  BP(mean): --  ABP: --  ABP(mean): --  RR: 20 (21 Feb 2019 04:35) (16 - 20)  SpO2: 94% (21 Feb 2019 04:38) (91% - 96%)           Input and Output:  I&O's Detail    20 Feb 2019 07:01  -  21 Feb 2019 07:00  --------------------------------------------------------  IN:    dextrose 5% + sodium chloride 0.45% with potassium chloride 20 mEq/L: 975 mL    dextrose 5% + sodium chloride 0.9%: 900 mL    Solution: 200 mL  Total IN: 2075 mL    OUT:  Total OUT: 0 mL    Total NET: 2075 mL          Lab Data                        9.3    10.72 )-----------( 175      ( 21 Feb 2019 07:10 )             29.8     02-20    141  |  102  |  20  ----------------------------<  116<H>  3.5   |  29  |  1.30    Ca    7.7<L>      20 Feb 2019 06:25    TPro  7.9  /  Alb  3.6  /  TBili  0.6  /  DBili  x   /  AST  21  /  ALT  32  /  AlkPhos  82  02-19            Review of Systems	      Objective     Physical Examination    heart s1s2  lung dec BS  abd soft      Pertinent Lab findings & Imaging      Dino:  NO   Adequate UO     I&O's Detail    20 Feb 2019 07:01  -  21 Feb 2019 07:00  --------------------------------------------------------  IN:    dextrose 5% + sodium chloride 0.45% with potassium chloride 20 mEq/L: 975 mL    dextrose 5% + sodium chloride 0.9%: 900 mL    Solution: 200 mL  Total IN: 2075 mL    OUT:  Total OUT: 0 mL    Total NET: 2075 mL               Discussed with:     Cultures:	        Radiology

## 2019-02-21 NOTE — DISCHARGE NOTE ADULT - CARE PLAN
Principal Discharge DX:	Diverticulitis  Goal:	repeat ct in 1 week  Assessment and plan of treatment:	on augmentin for 7 days  Secondary Diagnosis:	Electrolyte abnormality  Secondary Diagnosis:	COPD (chronic obstructive pulmonary disease)  Secondary Diagnosis:	High cholesterol  Secondary Diagnosis:	Hypertension  Secondary Diagnosis:	PTSD (post-traumatic stress disorder)

## 2019-02-21 NOTE — DISCHARGE NOTE ADULT - SECONDARY DIAGNOSIS.
Electrolyte abnormality COPD (chronic obstructive pulmonary disease) High cholesterol Hypertension PTSD (post-traumatic stress disorder)

## 2019-02-21 NOTE — DISCHARGE NOTE ADULT - CARE PROVIDER_API CALL
Juan Perez)  Surgery  [address] [place] [address]  Phone: [phone]  Fax: [phone]  Follow Up Time:

## 2019-02-21 NOTE — DISCHARGE NOTE ADULT - PATIENT PORTAL LINK FT
You can access the [facility] Patient Portal, offered by [facility], by registering with the following website: [email or web address]

## 2019-02-21 NOTE — PROGRESS NOTE ADULT - SUBJECTIVE AND OBJECTIVE BOX
INTERVAL HPI/OVERNIGHT EVENTS:  pt seen and examined  denies n/v  pain improving  +loose non bloody stool yesterday and one this am  afebrile overnight labs noted    MEDICATIONS  (STANDING):  amLODIPine   Tablet 10 milliGRAM(s) Oral daily  ARIPiprazole 20 milliGRAM(s) Oral daily  buDESOnide 160 MICROgram(s)/formoterol 4.5 MICROgram(s) Inhaler 2 Puff(s) Inhalation two times a day  buPROPion  milliGRAM(s) Oral two times a day  clonazePAM Tablet 1 milliGRAM(s) Oral three times a day  dextrose 5% + sodium chloride 0.45% with potassium chloride 20 mEq/L 1000 milliLiter(s) (75 mL/Hr) IV Continuous <Continuous>  fluticasone propionate 50 MICROgram(s)/spray Nasal Spray 1 Spray(s) Both Nostrils daily  lactobacillus acidophilus 1 Tablet(s) Oral three times a day with meals  pantoprazole    Tablet 40 milliGRAM(s) Oral two times a day  piperacillin/tazobactam IVPB. 3.375 Gram(s) IV Intermittent every 8 hours  sertraline 200 milliGRAM(s) Oral daily  simvastatin 40 milliGRAM(s) Oral at bedtime  tiotropium 18 MICROgram(s) Capsule 1 Capsule(s) Inhalation daily  traZODone 200 milliGRAM(s) Oral daily    MEDICATIONS  (PRN):  acetaminophen   Tablet .. 650 milliGRAM(s) Oral every 6 hours PRN Temp greater or equal to 38C (100.4F)  ALBUTerol    90 MICROgram(s) HFA Inhaler 2 Puff(s) Inhalation every 6 hours PRN Wheezing      Allergies    latex (Rash)  No Known Drug Allergies    Intolerances        Review of Systems:    General:  No wt loss, fevers, chills, night sweats, fatigue   Eyes:  Good vision, no reported pain  ENT:  No sore throat, pain, runny nose, dysphagia  CV:  No pain, palpitations, hypo/hypertension  Resp:  No dyspnea, cough, tachypnea, wheezing  GI:  No pain, No nausea, No vomiting, No diarrhea, No constipation, No weight loss, No fever, No pruritis, No rectal bleeding, No melena, No dysphagia  :  No pain, bleeding, incontinence, nocturia  Muscle:  No pain, weakness  Neuro:  No weakness, tingling, memory problems  Psych:  No fatigue, insomnia, mood problems, depression  Endocrine:  No polyuria, polydypsia, cold/heat intolerance  Heme:  No petechiae, ecchymosis, easy bruisability  Skin:  No rash, tattoos, scars, edema      Vital Signs Last 24 Hrs  T(C): 37 (21 Feb 2019 08:17), Max: 38.2 (20 Feb 2019 15:45)  T(F): 98.6 (21 Feb 2019 08:17), Max: 100.7 (20 Feb 2019 15:45)  HR: 75 (21 Feb 2019 08:17) (75 - 90)  BP: 132/76 (21 Feb 2019 08:17) (109/71 - 132/76)  BP(mean): --  RR: 18 (21 Feb 2019 08:17) (16 - 20)  SpO2: 91% (21 Feb 2019 08:17) (91% - 96%)    PHYSICAL EXAM:    General:  nad  HEENT:  NC/AT  Chest:  dec bs  Cardiovascular:  Regular rhythm, S1, S2  Abdomen:  Soft, ttp rlq no guarding +dt   Extremities:  no edema  Skin:  No rash  Neuro/Psych:  Awake alert responds appropriately    LABS:                        9.3    10.72 )-----------( 175      ( 21 Feb 2019 07:10 )             29.8     02-21    141  |  106  |  15  ----------------------------<  134<H>  3.4<L>   |  31  |  0.99    Ca    7.9<L>      21 Feb 2019 07:10  Phos  1.7     02-21  Mg     2.4     02-21            RADIOLOGY & ADDITIONAL TESTS:

## 2019-02-21 NOTE — DISCHARGE NOTE ADULT - HOSPITAL COURSE
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 Surgery (Ana), GI (Molly) and ID (Norma) consulted on the case. Surgery recommended trial of conservative, medical management and pt was started on zosyn, IVF and made NPO. Pt had hypoxic episode when he was not using CPAP during a daytime nap. Pt and respiratory therapy encouraged to use CPAP for day sleeping as well given pt's BOOKER. Pulm (Marin) consulted and followed the case. BCx negative. Pt's leukocytosis and fever resolved. Abd pain/tenderness improved. However, repeat CT Abd/P 3 days into admission did not show radiologic improvement. Surgery recommended to continue conservative management with Abx/IVF/NPO. ..... 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 Surgery (Ana), GI (Molly) and ID (Norma) consulted on the case. Surgery recommended trial of conservative, medical management and pt was started on zosyn, IVF and made NPO. Pt had hypoxic episode when he was not using CPAP during a daytime nap. Pt and respiratory therapy encouraged to use CPAP for day sleeping as well given pt's BOOKER. Pulm (Marin) consulted and followed the case. BCx negative. Pt's leukocytosis and fever resolved. Abd pain/tenderness improved. However, repeat CT Abd/P 3 days into admission did not show radiologic improvement. Surgery recommended to continue conservative management with Abx/IVF/NPO. ....

## 2019-02-21 NOTE — DISCHARGE NOTE ADULT - MEDICATION SUMMARY - MEDICATIONS TO TAKE
I will START or STAY ON the medications listed below when I get home from the hospital:    clonazepam 1 mg oral tablet  -- 1 tab(s) by mouth 3 times a day  -- Indication: For PTSD (post-traumatic stress disorder)    Zoloft  -- 200 milligram(s) by mouth once a day  -- Indication: For PTSD (post-traumatic stress disorder)    traZODone extended release  -- 200 milligram(s) by mouth once a day  -- Indication: For PTSD (post-traumatic stress disorder)    cetirizine 10 mg oral tablet  -- 1  by mouth once a day  -- Indication: For Prophylactic measure    simvastatin 20 mg oral tablet  -- 2 tab(s) by mouth once a day (at bedtime)  -- Indication: For Prophylactic measure    Abilify 20 mg oral tablet  -- 1 tab(s) by mouth once a day (at bedtime)  -- Indication: For PTSD (post-traumatic stress disorder)    Spiriva 18 mcg inhalation capsule  -- 1 each inhaled once a day  -- Indication: For COPD (chronic obstructive pulmonary disease)    Symbicort 160 mcg-4.5 mcg/inh inhalation aerosol  -- 2 puff(s) inhaled 2 times a day  -- Indication: For COPD (chronic obstructive pulmonary disease)    albuterol  --  inhaled , As Needed  -- Indication: For COPD (chronic obstructive pulmonary disease)    amLODIPine 10 mg oral tablet  -- 1 tab(s) by mouth once a day  -- Indication: For Hypertension    ranitidine 150 mg oral capsule  -- 1  by mouth once a day (at bedtime)  -- Indication: For GERD (gastroesophageal reflux disease)    Nasonex 50 mcg/inh nasal spray  -- 2 spray(s) into nose once a day  -- Indication: For COPD (chronic obstructive pulmonary disease)    fluticasone 50 mcg/inh nasal spray  -- 1 spray(s) into nose once a day  -- Indication: For COPD (chronic obstructive pulmonary disease)    Astepro 205.5 mcg/inh nasal spray  -- 2 spray(s) into nose once a day (at bedtime)  -- Indication: For COPD (chronic obstructive pulmonary disease)    amoxicillin-clavulanate 875 mg-125 mg oral tablet  -- 1 tab(s) by mouth 2 times a day MDD:2 tabs  -- Indication: For Diverticulitis    NexIUM 40 mg oral delayed release capsule  -- 1 cap(s) by mouth 2 times a day  -- Indication: For GERD (gastroesophageal reflux disease)    Nexium 40 mg oral delayed release capsule  -- 1 cap(s) by mouth 2 times a day - Takees in morning and afternoon  -- Indication: For GERD (gastroesophageal reflux disease)    Wellbutrin  -- 100 milligram(s) by mouth 2 times a day  -- Indication: For COPD (chronic obstructive pulmonary disease)

## 2019-02-22 LAB
ANION GAP SERPL CALC-SCNC: 9 MMOL/L — SIGNIFICANT CHANGE UP (ref 5–17)
BUN SERPL-MCNC: 13 MG/DL — SIGNIFICANT CHANGE UP (ref 7–23)
CALCIUM SERPL-MCNC: 8.1 MG/DL — LOW (ref 8.5–10.1)
CHLORIDE SERPL-SCNC: 105 MMOL/L — SIGNIFICANT CHANGE UP (ref 96–108)
CO2 SERPL-SCNC: 28 MMOL/L — SIGNIFICANT CHANGE UP (ref 22–31)
CREAT SERPL-MCNC: 0.85 MG/DL — SIGNIFICANT CHANGE UP (ref 0.5–1.3)
GLUCOSE SERPL-MCNC: 112 MG/DL — HIGH (ref 70–99)
HCT VFR BLD CALC: 28.8 % — LOW (ref 39–50)
HGB BLD-MCNC: 9 G/DL — LOW (ref 13–17)
MCHC RBC-ENTMCNC: 26.3 PG — LOW (ref 27–34)
MCHC RBC-ENTMCNC: 31.3 GM/DL — LOW (ref 32–36)
MCV RBC AUTO: 84.2 FL — SIGNIFICANT CHANGE UP (ref 80–100)
NRBC # BLD: 0 /100 WBCS — SIGNIFICANT CHANGE UP (ref 0–0)
PLATELET # BLD AUTO: 190 K/UL — SIGNIFICANT CHANGE UP (ref 150–400)
POTASSIUM SERPL-MCNC: 3.3 MMOL/L — LOW (ref 3.5–5.3)
POTASSIUM SERPL-SCNC: 3.3 MMOL/L — LOW (ref 3.5–5.3)
RBC # BLD: 3.42 M/UL — LOW (ref 4.2–5.8)
RBC # FLD: 16.9 % — HIGH (ref 10.3–14.5)
SODIUM SERPL-SCNC: 142 MMOL/L — SIGNIFICANT CHANGE UP (ref 135–145)
WBC # BLD: 10.25 K/UL — SIGNIFICANT CHANGE UP (ref 3.8–10.5)
WBC # FLD AUTO: 10.25 K/UL — SIGNIFICANT CHANGE UP (ref 3.8–10.5)

## 2019-02-22 PROCEDURE — 99233 SBSQ HOSP IP/OBS HIGH 50: CPT

## 2019-02-22 PROCEDURE — 74177 CT ABD & PELVIS W/CONTRAST: CPT | Mod: 26

## 2019-02-22 PROCEDURE — 99232 SBSQ HOSP IP/OBS MODERATE 35: CPT

## 2019-02-22 RX ORDER — BUPROPION HYDROCHLORIDE 150 MG/1
200 TABLET, EXTENDED RELEASE ORAL
Qty: 0 | Refills: 0 | Status: DISCONTINUED | OUTPATIENT
Start: 2019-02-22 | End: 2019-02-27

## 2019-02-22 RX ORDER — POTASSIUM PHOSPHATE, MONOBASIC POTASSIUM PHOSPHATE, DIBASIC 236; 224 MG/ML; MG/ML
30 INJECTION, SOLUTION INTRAVENOUS ONCE
Qty: 0 | Refills: 0 | Status: COMPLETED | OUTPATIENT
Start: 2019-02-22 | End: 2019-02-22

## 2019-02-22 RX ORDER — IOHEXOL 300 MG/ML
500 INJECTION, SOLUTION INTRAVENOUS
Qty: 0 | Refills: 0 | Status: COMPLETED | OUTPATIENT
Start: 2019-02-22 | End: 2019-02-22

## 2019-02-22 RX ADMIN — TIOTROPIUM BROMIDE 1 CAPSULE(S): 18 CAPSULE ORAL; RESPIRATORY (INHALATION) at 07:50

## 2019-02-22 RX ADMIN — PANTOPRAZOLE SODIUM 40 MILLIGRAM(S): 20 TABLET, DELAYED RELEASE ORAL at 17:59

## 2019-02-22 RX ADMIN — SERTRALINE 200 MILLIGRAM(S): 25 TABLET, FILM COATED ORAL at 11:07

## 2019-02-22 RX ADMIN — IOHEXOL 500 MILLILITER(S): 300 INJECTION, SOLUTION INTRAVENOUS at 10:25

## 2019-02-22 RX ADMIN — Medication 1 TABLET(S): at 13:50

## 2019-02-22 RX ADMIN — IOHEXOL 500 MILLILITER(S): 300 INJECTION, SOLUTION INTRAVENOUS at 08:27

## 2019-02-22 RX ADMIN — BUDESONIDE AND FORMOTEROL FUMARATE DIHYDRATE 2 PUFF(S): 160; 4.5 AEROSOL RESPIRATORY (INHALATION) at 22:22

## 2019-02-22 RX ADMIN — PIPERACILLIN AND TAZOBACTAM 25 GRAM(S): 4; .5 INJECTION, POWDER, LYOPHILIZED, FOR SOLUTION INTRAVENOUS at 22:13

## 2019-02-22 RX ADMIN — Medication 1 SPRAY(S): at 11:07

## 2019-02-22 RX ADMIN — Medication 1 MILLIGRAM(S): at 06:07

## 2019-02-22 RX ADMIN — BUDESONIDE AND FORMOTEROL FUMARATE DIHYDRATE 2 PUFF(S): 160; 4.5 AEROSOL RESPIRATORY (INHALATION) at 07:50

## 2019-02-22 RX ADMIN — HEPARIN SODIUM 5000 UNIT(S): 5000 INJECTION INTRAVENOUS; SUBCUTANEOUS at 06:12

## 2019-02-22 RX ADMIN — Medication 1 MILLIGRAM(S): at 13:50

## 2019-02-22 RX ADMIN — PIPERACILLIN AND TAZOBACTAM 25 GRAM(S): 4; .5 INJECTION, POWDER, LYOPHILIZED, FOR SOLUTION INTRAVENOUS at 06:12

## 2019-02-22 RX ADMIN — PANTOPRAZOLE SODIUM 40 MILLIGRAM(S): 20 TABLET, DELAYED RELEASE ORAL at 06:07

## 2019-02-22 RX ADMIN — DEXTROSE MONOHYDRATE, SODIUM CHLORIDE, AND POTASSIUM CHLORIDE 75 MILLILITER(S): 50; .745; 4.5 INJECTION, SOLUTION INTRAVENOUS at 10:26

## 2019-02-22 RX ADMIN — ARIPIPRAZOLE 20 MILLIGRAM(S): 15 TABLET ORAL at 22:13

## 2019-02-22 RX ADMIN — HEPARIN SODIUM 5000 UNIT(S): 5000 INJECTION INTRAVENOUS; SUBCUTANEOUS at 22:13

## 2019-02-22 RX ADMIN — POTASSIUM PHOSPHATE, MONOBASIC POTASSIUM PHOSPHATE, DIBASIC 85 MILLIMOLE(S): 236; 224 INJECTION, SOLUTION INTRAVENOUS at 11:04

## 2019-02-22 RX ADMIN — Medication 200 MILLIGRAM(S): at 22:13

## 2019-02-22 RX ADMIN — SIMVASTATIN 40 MILLIGRAM(S): 20 TABLET, FILM COATED ORAL at 22:13

## 2019-02-22 RX ADMIN — HEPARIN SODIUM 5000 UNIT(S): 5000 INJECTION INTRAVENOUS; SUBCUTANEOUS at 13:50

## 2019-02-22 RX ADMIN — Medication 1 MILLIGRAM(S): at 22:13

## 2019-02-22 RX ADMIN — PIPERACILLIN AND TAZOBACTAM 25 GRAM(S): 4; .5 INJECTION, POWDER, LYOPHILIZED, FOR SOLUTION INTRAVENOUS at 13:50

## 2019-02-22 RX ADMIN — BUPROPION HYDROCHLORIDE 200 MILLIGRAM(S): 150 TABLET, EXTENDED RELEASE ORAL at 22:13

## 2019-02-22 RX ADMIN — AMLODIPINE BESYLATE 10 MILLIGRAM(S): 2.5 TABLET ORAL at 06:07

## 2019-02-22 RX ADMIN — Medication 1 TABLET(S): at 17:59

## 2019-02-22 NOTE — PROGRESS NOTE ADULT - SUBJECTIVE AND OBJECTIVE BOX
infectious diseases progress note:    ELTON IVERSON is a 63y y. o. Male patient    Patient reports: "I would really like to go home today."    ROS:    EYES:  Negative  blurry vision or double vision  GASTROINTESTINAL:  Negative for nausea, vomiting, diarrhea  -otherwise negative except for subjective    Allergies    latex (Rash)  No Known Drug Allergies    Intolerances        ANTIBIOTICS/RELEVANT:  antimicrobials  piperacillin/tazobactam IVPB. 3.375 Gram(s) IV Intermittent every 8 hours    immunologic:    OTHER:  acetaminophen   Tablet .. 650 milliGRAM(s) Oral every 6 hours PRN  ALBUTerol    90 MICROgram(s) HFA Inhaler 2 Puff(s) Inhalation every 6 hours PRN  amLODIPine   Tablet 10 milliGRAM(s) Oral daily  ARIPiprazole 20 milliGRAM(s) Oral daily  buDESOnide 160 MICROgram(s)/formoterol 4.5 MICROgram(s) Inhaler 2 Puff(s) Inhalation two times a day  buPROPion  milliGRAM(s) Oral two times a day  cholestyramine Powder (Sugar-Free) 4 Gram(s) Oral daily  clonazePAM Tablet 1 milliGRAM(s) Oral three times a day  dextrose 5% + sodium chloride 0.45% with potassium chloride 20 mEq/L 1000 milliLiter(s) IV Continuous <Continuous>  fluticasone propionate 50 MICROgram(s)/spray Nasal Spray 1 Spray(s) Both Nostrils daily  heparin  Injectable 5000 Unit(s) SubCutaneous every 8 hours  lactobacillus acidophilus 1 Tablet(s) Oral three times a day with meals  pantoprazole    Tablet 40 milliGRAM(s) Oral two times a day  sertraline 200 milliGRAM(s) Oral daily  simvastatin 40 milliGRAM(s) Oral at bedtime  tiotropium 18 MICROgram(s) Capsule 1 Capsule(s) Inhalation daily  traZODone 200 milliGRAM(s) Oral daily      Objective:  Vital Signs Last 24 Hrs  T(C): 37 (22 Feb 2019 08:00), Max: 37 (22 Feb 2019 08:00)  T(F): 98.6 (22 Feb 2019 08:00), Max: 98.6 (22 Feb 2019 08:00)  HR: 77 (22 Feb 2019 08:00) (71 - 79)  BP: 120/70 (22 Feb 2019 08:00) (100/55 - 131/73)  BP(mean): --  RR: 17 (22 Feb 2019 08:00) (17 - 22)  SpO2: 94% (22 Feb 2019 08:00) (93% - 97%)    T(C): 37 (02-22-19 @ 08:00), Max: 38.2 (02-20-19 @ 15:45)  T(C): 37 (02-22-19 @ 08:00), Max: 38.2 (02-20-19 @ 15:45)  T(C): 37 (02-22-19 @ 08:00), Max: 38.2 (02-20-19 @ 15:45)    PHYSICAL EXAM:  Constitutional: Well-developed, well nourished  Eyes: PERRLA, EOMI  Ear/Nose/Throat: oropharynx normal	  Neck: no JVD, no lymphadenopathy, supple  Respiratory: no accessory muscle use  Cardiovascular: RRR,   Gastrointestinal: soft, NT  Extremities: no clubbing, no cyanosis, edema absent      LABS:                        9.0    10.25 )-----------( 190      ( 22 Feb 2019 07:03 )             28.8       10.25 02-22 @ 07:03  10.72 02-21 @ 07:10  14.48 02-20 @ 06:25  14.61 02-19 @ 07:57      02-22    142  |  105  |  13  ----------------------------<  112<H>  3.3<L>   |  28  |  0.85    Ca    8.1<L>      22 Feb 2019 07:03  Phos  1.7     02-22  Mg     2.1     02-22        Creatinine, Serum: 0.85 mg/dL (02-22-19 @ 07:03)  Creatinine, Serum: 0.99 mg/dL (02-21-19 @ 07:10)  Creatinine, Serum: 1.30 mg/dL (02-20-19 @ 06:25)  Creatinine, Serum: 1.20 mg/dL (02-19-19 @ 07:57)      MICROBIOLOGY:      Culture - Blood (collected 19 Feb 2019 19:27)  Source: .Blood Blood  Preliminary Report (20 Feb 2019 20:00):    No growth to date.    Culture - Blood (collected 19 Feb 2019 19:27)  Source: .Blood Blood  Preliminary Report (20 Feb 2019 20:00):    No growth to date.      RADIOLOGY & ADDITIONAL STUDIES:    < from: CT Abdomen and Pelvis w/ Oral Cont and w/ IV Cont (02.22.19 @ 10:46) >    EXAM:  CT ABDOMEN AND PELVIS OC IC                            PROCEDURE DATE:  02/22/2019          INTERPRETATION:  .    CLINICAL INFORMATION: Abdominal pain.    TECHNIQUE: Helical axial images were obtained from the domes of the   diaphragm through the pubic symphysis. 95 mls of Omnipaque-350  was   administered intravenously without complication and 5 mls were discarded.   Oral contrast was also administered. Coronal and Sagittal reconstructions   were obtained from the source data.     COMPARISON: Most recent prior CT examination of the abdomen and pelvis   from 2/19/2019.     FINDINGS: There is redemonstration of diverticulitis involving the distal   sigmoid colon with localized perforation. A few droplets of extraluminal   gas are noted. No distant free air is noted. Surrounding inflammatory   changes with localized amorphous fluid adjacent to this area is again   notable. Overall findings appear slightly worsened when compared with the   most recent prior CT exam.    There is no bowel obstruction. Oral contrast transits into the rectum.   Additional colonic diverticula are noted. There is reactive wall   thickening of the appendix which lies in close apposition to the   previously mentioned area of diverticulitis.    There hasbeen interval development of trace bilateral pleural effusions   with adjacent atelectasis.    The liver, gallbladder, biliary tree, pancreas, spleen, and adrenal   glands appear unremarkable.    There is nonspecific bilateral perinephric stranding. There is no   hydroureteronephrosis. Urinary bladder appears unremarkable.    There are multiple scattered nonspecific subcentimeter retroperitoneal   and mesenteric lymph nodes.    The prostate gland and seminal vesicles appear unremarkable. A   moderate-sized fat-containing left-sided inguinal hernia is noted. A   small fat-containing right-sided inguinal hernia is noted.    Multilevel degenerative changes are noted within the imaged potions of   the spine.    IMPRESSION: Redemonstration of perforated diverticulitis involving the   distal sigmoid colon with adjacent fluid and inflammatory changes.   Overall findings appear slightly worsened when compared with the prior CT   exam from 2/19/2019.    Interval development of trace bilateral pleural effusions.

## 2019-02-22 NOTE — PROGRESS NOTE ADULT - SUBJECTIVE AND OBJECTIVE BOX
Date/Time Patient Seen:  		  Referring MD:   Data Reviewed	       Patient is a 63y old  Male who presents with a chief complaint of abdominal pain , diarrhea (21 Feb 2019 15:34)      Subjective/HPI     PAST MEDICAL & SURGICAL HISTORY:  Sleep apnea: does not use CPAP machine  Bakers cyst: Bilateral  Osteoarthrosis, localized  PTSD (post-traumatic stress disorder): September 11th 2001  Chronic rhinosinusitis  GERD (gastroesophageal reflux disease)  Asthma  COPD (chronic obstructive pulmonary disease)  High cholesterol  Hypertension  Bakers cyst: Bilateral removal Bakers Cyst  S/P arthroscopic surgery of left knee  S/P wrist surgery: ganglion cyst  S/P tonsillectomy and adenoidectomy  S/P knee replacement, right        Medication list         MEDICATIONS  (STANDING):  amLODIPine   Tablet 10 milliGRAM(s) Oral daily  ARIPiprazole 20 milliGRAM(s) Oral daily  buDESOnide 160 MICROgram(s)/formoterol 4.5 MICROgram(s) Inhaler 2 Puff(s) Inhalation two times a day  buPROPion  milliGRAM(s) Oral two times a day  cholestyramine Powder (Sugar-Free) 4 Gram(s) Oral daily  clonazePAM Tablet 1 milliGRAM(s) Oral three times a day  dextrose 5% + sodium chloride 0.45% with potassium chloride 20 mEq/L 1000 milliLiter(s) (75 mL/Hr) IV Continuous <Continuous>  fluticasone propionate 50 MICROgram(s)/spray Nasal Spray 1 Spray(s) Both Nostrils daily  heparin  Injectable 5000 Unit(s) SubCutaneous every 8 hours  iohexol 300 mG (iodine)/mL Oral Solution 500 milliLiter(s) Oral every 1 hour  lactobacillus acidophilus 1 Tablet(s) Oral three times a day with meals  pantoprazole    Tablet 40 milliGRAM(s) Oral two times a day  piperacillin/tazobactam IVPB. 3.375 Gram(s) IV Intermittent every 8 hours  sertraline 200 milliGRAM(s) Oral daily  simvastatin 40 milliGRAM(s) Oral at bedtime  tiotropium 18 MICROgram(s) Capsule 1 Capsule(s) Inhalation daily  traZODone 200 milliGRAM(s) Oral daily    MEDICATIONS  (PRN):  acetaminophen   Tablet .. 650 milliGRAM(s) Oral every 6 hours PRN Temp greater or equal to 38C (100.4F)  ALBUTerol    90 MICROgram(s) HFA Inhaler 2 Puff(s) Inhalation every 6 hours PRN Wheezing         Vitals log        ICU Vital Signs Last 24 Hrs  T(C): 36.6 (22 Feb 2019 01:34), Max: 37 (21 Feb 2019 08:17)  T(F): 97.9 (22 Feb 2019 01:34), Max: 98.6 (21 Feb 2019 08:17)  HR: 76 (22 Feb 2019 06:00) (71 - 79)  BP: 112/72 (22 Feb 2019 06:00) (100/55 - 132/76)  BP(mean): --  ABP: --  ABP(mean): --  RR: 22 (21 Feb 2019 15:58) (18 - 22)  SpO2: 97% (22 Feb 2019 01:34) (91% - 97%)           Input and Output:  I&O's Detail      Lab Data                        9.0    10.25 )-----------( 190      ( 22 Feb 2019 07:03 )             28.8     02-22    142  |  105  |  13  ----------------------------<  112<H>  3.3<L>   |  28  |  0.85    Ca    8.1<L>      22 Feb 2019 07:03  Phos  1.7     02-21  Mg     2.4     02-21              Review of Systems	      Objective     Physical Examination  heart s1s2  lung dec BS        Pertinent Lab findings & Imaging      Dino:  NO   Adequate UO     I&O's Detail           Discussed with:     Cultures:	        Radiology

## 2019-02-22 NOTE — DIETITIAN INITIAL EVALUATION ADULT. - NS AS NUTRI INTERV MEALS SNACK
When medically feasible recommend to advance diet to DASH/TLC, low fiber diet./General/healthful diet

## 2019-02-22 NOTE — DIETITIAN INITIAL EVALUATION ADULT. - OTHER INFO
Pt seen for NPO x 3 days. As per chart pt is a 63 year old male with a PMH of HTN, HLD, COPD, GERD, diverticulitis, PTSD. Pt admitted with diverticulitis, currently undergoing conservation measures for treatment. Pt is currently NPO. Pt's admission weight per chart 259.15lbs. Pt reports current and UBW of 250-255lbs, stable, denies and recent major weight changes. Denies any chewing/swallowing difficulties, reports some diarrhea, last BM 2/21.

## 2019-02-22 NOTE — PROGRESS NOTE ADULT - SUBJECTIVE AND OBJECTIVE BOX
INTERVAL HPI/OVERNIGHT EVENTS:  pt seen and examined  pain better, denies n/v  w loose stools yesterday and overnight  afebrile overnight labs noted    MEDICATIONS  (STANDING):  amLODIPine   Tablet 10 milliGRAM(s) Oral daily  ARIPiprazole 20 milliGRAM(s) Oral daily  buDESOnide 160 MICROgram(s)/formoterol 4.5 MICROgram(s) Inhaler 2 Puff(s) Inhalation two times a day  buPROPion  milliGRAM(s) Oral two times a day  cholestyramine Powder (Sugar-Free) 4 Gram(s) Oral daily  clonazePAM Tablet 1 milliGRAM(s) Oral three times a day  dextrose 5% + sodium chloride 0.45% with potassium chloride 20 mEq/L 1000 milliLiter(s) (75 mL/Hr) IV Continuous <Continuous>  fluticasone propionate 50 MICROgram(s)/spray Nasal Spray 1 Spray(s) Both Nostrils daily  heparin  Injectable 5000 Unit(s) SubCutaneous every 8 hours  iohexol 300 mG (iodine)/mL Oral Solution 500 milliLiter(s) Oral every 1 hour  lactobacillus acidophilus 1 Tablet(s) Oral three times a day with meals  pantoprazole    Tablet 40 milliGRAM(s) Oral two times a day  piperacillin/tazobactam IVPB. 3.375 Gram(s) IV Intermittent every 8 hours  potassium phosphate IVPB 30 milliMole(s) IV Intermittent once  sertraline 200 milliGRAM(s) Oral daily  simvastatin 40 milliGRAM(s) Oral at bedtime  tiotropium 18 MICROgram(s) Capsule 1 Capsule(s) Inhalation daily  traZODone 200 milliGRAM(s) Oral daily    MEDICATIONS  (PRN):  acetaminophen   Tablet .. 650 milliGRAM(s) Oral every 6 hours PRN Temp greater or equal to 38C (100.4F)  ALBUTerol    90 MICROgram(s) HFA Inhaler 2 Puff(s) Inhalation every 6 hours PRN Wheezing      Allergies    latex (Rash)  No Known Drug Allergies    Intolerances        Review of Systems:    General:  No wt loss, fevers, chills, night sweats, fatigue   Eyes:  Good vision, no reported pain  ENT:  No sore throat, pain, runny nose, dysphagia  CV:  No pain, palpitations, hypo/hypertension  Resp:  No dyspnea, cough, tachypnea, wheezing  GI:  mild pain, No nausea, No vomiting, +diarrhea, No constipation, No weight loss, No fever, No pruritis, No rectal bleeding, No melena, No dysphagia  :  No pain, bleeding, incontinence, nocturia  Muscle:  No pain, weakness  Neuro:  No weakness, tingling, memory problems  Psych:  No fatigue, insomnia, mood problems, depression  Endocrine:  No polyuria, polydypsia, cold/heat intolerance  Heme:  No petechiae, ecchymosis, easy bruisability  Skin:  No rash, tattoos, scars, edema      Vital Signs Last 24 Hrs  T(C): 37 (22 Feb 2019 08:00), Max: 37 (22 Feb 2019 08:00)  T(F): 98.6 (22 Feb 2019 08:00), Max: 98.6 (22 Feb 2019 08:00)  HR: 77 (22 Feb 2019 08:00) (71 - 79)  BP: 120/70 (22 Feb 2019 08:00) (100/55 - 131/73)  BP(mean): --  RR: 17 (22 Feb 2019 08:00) (17 - 22)  SpO2: 94% (22 Feb 2019 08:00) (93% - 97%)    PHYSICAL EXAM:    General:  nad  HEENT:  NC/AT  Chest:  dec bs  Cardiovascular:  Regular rhythm, S1, S2  Abdomen:  Soft, mild ttp rlq +dt   Extremities:  no edema  Skin:  No rash  Neuro/Psych:  Awake alert responds appropriately        LABS:                        9.0    10.25 )-----------( 190      ( 22 Feb 2019 07:03 )             28.8     02-22    142  |  105  |  13  ----------------------------<  112<H>  3.3<L>   |  28  |  0.85    Ca    8.1<L>      22 Feb 2019 07:03  Phos  1.7     02-22  Mg     2.1     02-22            RADIOLOGY & ADDITIONAL TESTS:

## 2019-02-22 NOTE — DIETITIAN INITIAL EVALUATION ADULT. - NS AS NUTRI INTERV ED CONTENT
Recommended modifications/Purpose of the nutrition education/Priority modifications/Pt provided with oral and written education on low fiber diet. Topics covered: 1) foods high in fiber that should be avoided, 2) avoiding raw fruits and vegetables, nuts and seeds. 3) meal planning. RD to remain available./Nutrition relationship to health/disease/Other (specify)

## 2019-02-22 NOTE — PROGRESS NOTE ADULT - SUBJECTIVE AND OBJECTIVE BOX
Patient is a 63y old  Male who presents with a chief complaint of abdominal pain , diarrhea       INTERVAL HPI/OVERNIGHT EVENTS: Pt reports mild RLQ abd pain. Denies fever, chills, nausea, vomiting, CP, SOB. Pt having loose stool, but has only been on IVF for days.    MEDICATIONS  (STANDING):  amLODIPine   Tablet 10 milliGRAM(s) Oral daily  ARIPiprazole 20 milliGRAM(s) Oral daily  buDESOnide 160 MICROgram(s)/formoterol 4.5 MICROgram(s) Inhaler 2 Puff(s) Inhalation two times a day  buPROPion  milliGRAM(s) Oral <User Schedule>  cholestyramine Powder (Sugar-Free) 4 Gram(s) Oral daily  clonazePAM Tablet 1 milliGRAM(s) Oral three times a day  dextrose 5% + sodium chloride 0.45% with potassium chloride 20 mEq/L 1000 milliLiter(s) (75 mL/Hr) IV Continuous <Continuous>  fluticasone propionate 50 MICROgram(s)/spray Nasal Spray 1 Spray(s) Both Nostrils daily  heparin  Injectable 5000 Unit(s) SubCutaneous every 8 hours  lactobacillus acidophilus 1 Tablet(s) Oral three times a day with meals  pantoprazole    Tablet 40 milliGRAM(s) Oral two times a day  piperacillin/tazobactam IVPB. 3.375 Gram(s) IV Intermittent every 8 hours  sertraline 200 milliGRAM(s) Oral daily  simvastatin 40 milliGRAM(s) Oral at bedtime  tiotropium 18 MICROgram(s) Capsule 1 Capsule(s) Inhalation daily  traZODone 200 milliGRAM(s) Oral daily    MEDICATIONS  (PRN):  acetaminophen   Tablet .. 650 milliGRAM(s) Oral every 6 hours PRN Temp greater or equal to 38C (100.4F)  ALBUTerol    90 MICROgram(s) HFA Inhaler 2 Puff(s) Inhalation every 6 hours PRN Wheezing      Allergies    latex (Rash)  No Known Drug Allergies    Intolerances        REVIEW OF SYSTEMS:  CONSTITUTIONAL: resolved fever ; denies chills  HEENT:  No headache, no sore throat  RESPIRATORY: No cough, wheezing, or shortness of breath  CARDIOVASCULAR: No chest pain, palpitations  GASTROINTESTINAL: mild abd pain, Reports having loose, brown BMs; No nausea, vomiting  GENITOURINARY: No dysuria, frequency, or hematuria  NEUROLOGICAL: no focal weakness or dizziness  MUSCULOSKELETAL: no myalgias       Vital Signs Last 24 Hrs  T(C): 37 (22 Feb 2019 08:00), Max: 37 (22 Feb 2019 08:00)  T(F): 98.6 (22 Feb 2019 08:00), Max: 98.6 (22 Feb 2019 08:00)  HR: 77 (22 Feb 2019 08:00) (71 - 79)  BP: 120/70 (22 Feb 2019 08:00) (100/55 - 131/73)  BP(mean): --  RR: 17 (22 Feb 2019 08:00) (17 - 22)  SpO2: 94% (22 Feb 2019 08:00) (93% - 97%)    PHYSICAL EXAM:  GENERAL: NAD, obese, lying flat on his back at 20 degree angle on bed without any difficulty breathing  HEENT:  anicteric, moist mucous membranes  CHEST/LUNG:  diminished breath sounds in general but otherwise CTA b/l , no rales, wheezes, or rhonchi  HEART:  RRR, S1, S2  ABDOMEN:  BS+, soft, +RLQ abd tenderness without guarding, nondistended  EXTREMITIES: no edema, cyanosis, or calf tenderness  NERVOUS SYSTEM: answers questions and follows commands appropriately    LABS:                        9.0    10.25 )-----------( 190      ( 22 Feb 2019 07:03 )             28.8     CBC Full  -  ( 22 Feb 2019 07:03 )  WBC Count : 10.25 K/uL  Hemoglobin : 9.0 g/dL  Hematocrit : 28.8 %  Platelet Count - Automated : 190 K/uL  Mean Cell Volume : 84.2 fl  Mean Cell Hemoglobin : 26.3 pg  Mean Cell Hemoglobin Concentration : 31.3 gm/dL  Auto Neutrophil # : x  Auto Lymphocyte # : x  Auto Monocyte # : x  Auto Eosinophil # : x  Auto Basophil # : x  Auto Neutrophil % : x  Auto Lymphocyte % : x  Auto Monocyte % : x  Auto Eosinophil % : x  Auto Basophil % : x    22 Feb 2019 07:03    142    |  105    |  13     ----------------------------<  112    3.3     |  28     |  0.85     Ca    8.1        22 Feb 2019 07:03  Phos  1.7       22 Feb 2019 07:03  Mg     2.1       22 Feb 2019 07:03          CAPILLARY BLOOD GLUCOSE            Culture - Blood (collected 02-19-19 @ 19:27)  Source: .Blood Blood  Preliminary Report (02-20-19 @ 20:00):    No growth to date.    Culture - Blood (collected 02-19-19 @ 19:27)  Source: .Blood Blood  Preliminary Report (02-20-19 @ 20:00):    No growth to date.        RADIOLOGY & ADDITIONAL TESTS:    Personally reviewed.     Consultant(s) Notes Reviewed:  [x] YES  [ ] NO

## 2019-02-22 NOTE — DIETITIAN INITIAL EVALUATION ADULT. - NUTRITION INTERVENTION
Meals and Snack/Nutrition Education Collaboration and Referral of Nutrition Care/Meals and Snack/Nutrition Education

## 2019-02-22 NOTE — PROGRESS NOTE ADULT - SUBJECTIVE AND OBJECTIVE BOX
HOSPITAL DAY: 3    SUBJECTIVE:  Patient seen at beside. Patient with no overnight events. Patient with no complaints at this time, pain currently controlled.  Patient currently NPO, admits to flatus and 5+ episodes of diarrhea overnight. Patient denies any headaches, chest pain, shortness of breath, palpitations, nausea, vomiting, fevers, chills.    Vital Signs Last 24 Hrs  T(C): 37 (22 Feb 2019 08:00), Max: 37 (22 Feb 2019 08:00)  T(F): 98.6 (22 Feb 2019 08:00), Max: 98.6 (22 Feb 2019 08:00)  HR: 77 (22 Feb 2019 08:00) (71 - 79)  BP: 120/70 (22 Feb 2019 08:00) (100/55 - 131/73)  BP(mean): --  RR: 17 (22 Feb 2019 08:00) (17 - 22)  SpO2: 94% (22 Feb 2019 08:00) (93% - 97%)    PHYSICAL EXAM:  GENERAL: No acute distress, well-developed  HEAD:  Atraumatic, Normocephalic  CHEST/LUNG: CTAB; No wheezes, rales, or rhonchi  HEART: Regular rate and rhythm; No murmurs, rubs, or gallops  ABDOMEN: Soft, non-tender, non-distended; normal bowel sounds  NEUROLOGY: A&O x 3, no focal deficits    I&O's Summary    I&O's Detail    MEDICATIONS  (STANDING):  amLODIPine   Tablet 10 milliGRAM(s) Oral daily  ARIPiprazole 20 milliGRAM(s) Oral daily  buDESOnide 160 MICROgram(s)/formoterol 4.5 MICROgram(s) Inhaler 2 Puff(s) Inhalation two times a day  buPROPion  milliGRAM(s) Oral two times a day  cholestyramine Powder (Sugar-Free) 4 Gram(s) Oral daily  clonazePAM Tablet 1 milliGRAM(s) Oral three times a day  dextrose 5% + sodium chloride 0.45% with potassium chloride 20 mEq/L 1000 milliLiter(s) (75 mL/Hr) IV Continuous <Continuous>  fluticasone propionate 50 MICROgram(s)/spray Nasal Spray 1 Spray(s) Both Nostrils daily  heparin  Injectable 5000 Unit(s) SubCutaneous every 8 hours  lactobacillus acidophilus 1 Tablet(s) Oral three times a day with meals  pantoprazole    Tablet 40 milliGRAM(s) Oral two times a day  piperacillin/tazobactam IVPB. 3.375 Gram(s) IV Intermittent every 8 hours  sertraline 200 milliGRAM(s) Oral daily  simvastatin 40 milliGRAM(s) Oral at bedtime  tiotropium 18 MICROgram(s) Capsule 1 Capsule(s) Inhalation daily  traZODone 200 milliGRAM(s) Oral daily    MEDICATIONS  (PRN):  acetaminophen   Tablet .. 650 milliGRAM(s) Oral every 6 hours PRN Temp greater or equal to 38C (100.4F)  ALBUTerol    90 MICROgram(s) HFA Inhaler 2 Puff(s) Inhalation every 6 hours PRN Wheezing    LABS:                        9.0    10.25 )-----------( 190      ( 22 Feb 2019 07:03 )             28.8     02-22    142  |  105  |  13  ----------------------------<  112<H>  3.3<L>   |  28  |  0.85    Ca    8.1<L>      22 Feb 2019 07:03  Phos  1.7     02-22  Mg     2.1     02-22          RADIOLOGY & ADDITIONAL STUDIES:  < from: CT Abdomen and Pelvis w/ Oral Cont and w/ IV Cont (02.22.19 @ 10:46) >  INTERPRETATION:  .    CLINICAL INFORMATION: Abdominal pain.    TECHNIQUE: Helical axial images were obtained from the domes of the   diaphragm through the pubic symphysis. 95 mls of Omnipaque-350  was   administered intravenously without complication and 5 mls were discarded.   Oral contrast was also administered. Coronal and Sagittal reconstructions   were obtained from the source data.     COMPARISON: Most recent prior CT examination of the abdomen and pelvis   from 2/19/2019.     FINDINGS: There is redemonstration of diverticulitis involving the distal   sigmoid colon with localized perforation. A few droplets of extraluminal   gas are noted. No distant free air is noted. Surrounding inflammatory   changes with localized amorphous fluid adjacent to this area is again   notable. Overall findings appear slightly worsened when compared with the   most recent prior CT exam.    There is no bowel obstruction. Oral contrast transits into the rectum.   Additional colonic diverticula are noted. There is reactive wall   thickening of the appendix which lies in close apposition to the   previously mentioned area of diverticulitis.    There hasbeen interval development of trace bilateral pleural effusions   with adjacent atelectasis.    The liver, gallbladder, biliary tree, pancreas, spleen, and adrenal   glands appear unremarkable.    There is nonspecific bilateral perinephric stranding. There is no   hydroureteronephrosis. Urinary bladder appears unremarkable.    There are multiple scattered nonspecific subcentimeter retroperitoneal   and mesenteric lymph nodes.    The prostate gland and seminal vesicles appear unremarkable. A   moderate-sized fat-containing left-sided inguinal hernia is noted. A   small fat-containing right-sided inguinal hernia is noted.    Multilevel degenerative changes are noted within the imaged potions of   the spine.    IMPRESSION: Redemonstration of perforated diverticulitis involving the   distal sigmoid colon with adjacent fluid and inflammatory changes.   Overall findings appear slightly worsened when compared with the prior CT   exam from 2/19/2019.    Interval development of trace bilateral pleural effusions.    < end of copied text >      ASSESSMENT    63y Male with sigmoid diverticulitis with microperforation currently being managed conservatively with IV abx clinically improving.     PLAN  - Discussed with Dr. Perez and surgical team  - CT results aknowledged  - Will continue with conservative medical management with IV zosyn for now  - pain control, supportive care  - OOB  - NPO, IVF   - serial abdominal exams  - labs in am    Surgical Team Contact Information  Spectralink: Ext: [phone] or [phone]  Pager: [phone]

## 2019-02-22 NOTE — DIETITIAN INITIAL EVALUATION ADULT. - ADHERENCE
Pt reports that he does not follow any therapeutic diets PTA. Pt reports that he has had diverticulitis in the past and has avoided seeds. Supplement use PTA includes MVI, Vitamin D3, Calcium with vitamin D. NKFA./n/a

## 2019-02-22 NOTE — PROGRESS NOTE ADULT - ATTENDING COMMENTS
Note written by attending, see above.  Time spent: 40min. More than 50% of the visit was spent counseling the patient on his condition - perforated acute diverticulitis, severe BOOKER, hypoxia, COPD, repeat CT, hypokalemia, hypophosphatemia. Note written by attending, see above.  Time spent: 40min. More than 50% of the visit was spent counseling the patient / pt's son on his condition - perforated acute diverticulitis, severe BOOKER, hypoxia, COPD, repeat CT, hypokalemia, hypophosphatemia.

## 2019-02-23 ENCOUNTER — TRANSCRIPTION ENCOUNTER (OUTPATIENT)
Age: 64
End: 2019-02-23

## 2019-02-23 DIAGNOSIS — E87.8 OTHER DISORDERS OF ELECTROLYTE AND FLUID BALANCE, NOT ELSEWHERE CLASSIFIED: ICD-10-CM

## 2019-02-23 LAB
ANION GAP SERPL CALC-SCNC: 9 MMOL/L — SIGNIFICANT CHANGE UP (ref 5–17)
BUN SERPL-MCNC: 8 MG/DL — SIGNIFICANT CHANGE UP (ref 7–23)
CALCIUM SERPL-MCNC: 8.2 MG/DL — LOW (ref 8.5–10.1)
CHLORIDE SERPL-SCNC: 107 MMOL/L — SIGNIFICANT CHANGE UP (ref 96–108)
CO2 SERPL-SCNC: 25 MMOL/L — SIGNIFICANT CHANGE UP (ref 22–31)
CREAT SERPL-MCNC: 0.78 MG/DL — SIGNIFICANT CHANGE UP (ref 0.5–1.3)
FERRITIN SERPL-MCNC: 126 NG/ML — SIGNIFICANT CHANGE UP (ref 30–400)
FOLATE SERPL-MCNC: 16 NG/ML — SIGNIFICANT CHANGE UP
GLUCOSE SERPL-MCNC: 109 MG/DL — HIGH (ref 70–99)
HCT VFR BLD CALC: 28.7 % — LOW (ref 39–50)
HGB BLD-MCNC: 9 G/DL — LOW (ref 13–17)
MCHC RBC-ENTMCNC: 26.4 PG — LOW (ref 27–34)
MCHC RBC-ENTMCNC: 31.4 GM/DL — LOW (ref 32–36)
MCV RBC AUTO: 84.2 FL — SIGNIFICANT CHANGE UP (ref 80–100)
NRBC # BLD: 0 /100 WBCS — SIGNIFICANT CHANGE UP (ref 0–0)
PHOSPHATE SERPL-MCNC: 2.9 MG/DL — SIGNIFICANT CHANGE UP (ref 2.5–4.5)
PLATELET # BLD AUTO: 180 K/UL — SIGNIFICANT CHANGE UP (ref 150–400)
POTASSIUM SERPL-MCNC: 3.3 MMOL/L — LOW (ref 3.5–5.3)
POTASSIUM SERPL-SCNC: 3.3 MMOL/L — LOW (ref 3.5–5.3)
RBC # BLD: 3.41 M/UL — LOW (ref 4.2–5.8)
RBC # FLD: 17 % — HIGH (ref 10.3–14.5)
SODIUM SERPL-SCNC: 141 MMOL/L — SIGNIFICANT CHANGE UP (ref 135–145)
VIT B12 SERPL-MCNC: 1291 PG/ML — HIGH (ref 232–1245)
WBC # BLD: 9.61 K/UL — SIGNIFICANT CHANGE UP (ref 3.8–10.5)
WBC # FLD AUTO: 9.61 K/UL — SIGNIFICANT CHANGE UP (ref 3.8–10.5)

## 2019-02-23 PROCEDURE — 99221 1ST HOSP IP/OBS SF/LOW 40: CPT

## 2019-02-23 PROCEDURE — 99233 SBSQ HOSP IP/OBS HIGH 50: CPT

## 2019-02-23 RX ORDER — HYDROCORTISONE 1 %
1 OINTMENT (GRAM) TOPICAL ONCE
Qty: 0 | Refills: 0 | Status: DISCONTINUED | OUTPATIENT
Start: 2019-02-23 | End: 2019-02-27

## 2019-02-23 RX ADMIN — PANTOPRAZOLE SODIUM 40 MILLIGRAM(S): 20 TABLET, DELAYED RELEASE ORAL at 17:45

## 2019-02-23 RX ADMIN — Medication 1 MILLIGRAM(S): at 06:26

## 2019-02-23 RX ADMIN — CHOLESTYRAMINE 4 GRAM(S): 4 POWDER, FOR SUSPENSION ORAL at 09:48

## 2019-02-23 RX ADMIN — Medication 1 TABLET(S): at 08:58

## 2019-02-23 RX ADMIN — BUDESONIDE AND FORMOTEROL FUMARATE DIHYDRATE 2 PUFF(S): 160; 4.5 AEROSOL RESPIRATORY (INHALATION) at 08:58

## 2019-02-23 RX ADMIN — Medication 1 MILLIGRAM(S): at 14:56

## 2019-02-23 RX ADMIN — Medication 1 MILLIGRAM(S): at 23:01

## 2019-02-23 RX ADMIN — PIPERACILLIN AND TAZOBACTAM 25 GRAM(S): 4; .5 INJECTION, POWDER, LYOPHILIZED, FOR SOLUTION INTRAVENOUS at 06:50

## 2019-02-23 RX ADMIN — BUPROPION HYDROCHLORIDE 200 MILLIGRAM(S): 150 TABLET, EXTENDED RELEASE ORAL at 23:01

## 2019-02-23 RX ADMIN — PIPERACILLIN AND TAZOBACTAM 25 GRAM(S): 4; .5 INJECTION, POWDER, LYOPHILIZED, FOR SOLUTION INTRAVENOUS at 23:02

## 2019-02-23 RX ADMIN — DEXTROSE MONOHYDRATE, SODIUM CHLORIDE, AND POTASSIUM CHLORIDE 75 MILLILITER(S): 50; .745; 4.5 INJECTION, SOLUTION INTRAVENOUS at 19:53

## 2019-02-23 RX ADMIN — AMLODIPINE BESYLATE 10 MILLIGRAM(S): 2.5 TABLET ORAL at 06:26

## 2019-02-23 RX ADMIN — Medication 1 TABLET(S): at 17:45

## 2019-02-23 RX ADMIN — ARIPIPRAZOLE 20 MILLIGRAM(S): 15 TABLET ORAL at 12:47

## 2019-02-23 RX ADMIN — PIPERACILLIN AND TAZOBACTAM 25 GRAM(S): 4; .5 INJECTION, POWDER, LYOPHILIZED, FOR SOLUTION INTRAVENOUS at 14:55

## 2019-02-23 RX ADMIN — HEPARIN SODIUM 5000 UNIT(S): 5000 INJECTION INTRAVENOUS; SUBCUTANEOUS at 06:26

## 2019-02-23 RX ADMIN — SERTRALINE 200 MILLIGRAM(S): 25 TABLET, FILM COATED ORAL at 12:47

## 2019-02-23 RX ADMIN — PANTOPRAZOLE SODIUM 40 MILLIGRAM(S): 20 TABLET, DELAYED RELEASE ORAL at 06:26

## 2019-02-23 RX ADMIN — DEXTROSE MONOHYDRATE, SODIUM CHLORIDE, AND POTASSIUM CHLORIDE 75 MILLILITER(S): 50; .745; 4.5 INJECTION, SOLUTION INTRAVENOUS at 05:31

## 2019-02-23 RX ADMIN — Medication 200 MILLIGRAM(S): at 23:01

## 2019-02-23 RX ADMIN — TIOTROPIUM BROMIDE 1 CAPSULE(S): 18 CAPSULE ORAL; RESPIRATORY (INHALATION) at 08:58

## 2019-02-23 RX ADMIN — SIMVASTATIN 40 MILLIGRAM(S): 20 TABLET, FILM COATED ORAL at 23:01

## 2019-02-23 RX ADMIN — BUDESONIDE AND FORMOTEROL FUMARATE DIHYDRATE 2 PUFF(S): 160; 4.5 AEROSOL RESPIRATORY (INHALATION) at 17:46

## 2019-02-23 RX ADMIN — BUDESONIDE AND FORMOTEROL FUMARATE DIHYDRATE 2 PUFF(S): 160; 4.5 AEROSOL RESPIRATORY (INHALATION) at 18:05

## 2019-02-23 RX ADMIN — HEPARIN SODIUM 5000 UNIT(S): 5000 INJECTION INTRAVENOUS; SUBCUTANEOUS at 23:02

## 2019-02-23 RX ADMIN — HEPARIN SODIUM 5000 UNIT(S): 5000 INJECTION INTRAVENOUS; SUBCUTANEOUS at 14:55

## 2019-02-23 RX ADMIN — Medication 1 SPRAY(S): at 14:34

## 2019-02-23 RX ADMIN — Medication 1 TABLET(S): at 12:47

## 2019-02-23 NOTE — PROGRESS NOTE ADULT - ATTENDING COMMENTS
I'm available for issues over the remainder of the weekend, please call if ID input needed.    Andi Green MD  [phone]

## 2019-02-23 NOTE — PROGRESS NOTE ADULT - SUBJECTIVE AND OBJECTIVE BOX
[facility], Division of Infectious Diseases  [address] [place]  [phone]    Name: ELTON IVERSON  Age: 63y  Gender: Male  MRN: [id number]    Interval History--  Notes reviewed. Seen earlier today.  Repeat CT report reviewed.  Patient feels ok, wants to try clear liquids today.  Pain not an issue.     Past Medical History--  Sleep apnea  Bakers cyst  Osteoarthrosis, localized  PTSD (post-traumatic stress disorder)  Chronic rhinosinusitis  GERD (gastroesophageal reflux disease)  Asthma  COPD (chronic obstructive pulmonary disease)  High cholesterol  Hypertension  Bakers cyst  S/P arthroscopic surgery of left knee  S/P wrist surgery  S/P tonsillectomy and adenoidectomy  S/P knee replacement, right      For details regarding the patient's social history, family history, and other miscellaneous elements, please refer the initial infectious diseases consultation and/or the admitting history and physical examination for this admission.    Allergies    latex (Rash)  No Known Drug Allergies    Intolerances        Medications--  Antibiotics:  piperacillin/tazobactam IVPB. 3.375 Gram(s) IV Intermittent every 8 hours    Immunologic:    Other:  acetaminophen   Tablet .. PRN  ALBUTerol    90 MICROgram(s) HFA Inhaler PRN  amLODIPine   Tablet  ARIPiprazole  buDESOnide 160 MICROgram(s)/formoterol 4.5 MICROgram(s) Inhaler  buPROPion SR  cholestyramine Powder (Sugar-Free)  clonazePAM Tablet  dextrose 5% + sodium chloride 0.45% with potassium chloride 20 mEq/L  fluticasone propionate 50 MICROgram(s)/spray Nasal Spray  heparin  Injectable  lactobacillus acidophilus  pantoprazole    Tablet  sertraline  simvastatin  tiotropium 18 MICROgram(s) Capsule  traZODone      Review of Systems--  A 10-point review of systems was obtained.  Review of systems otherwise unchanged compared to prior visit except as previously noted.    Physical Examination--  Vital Signs: T(F): 99 (02-23-19 @ 07:45), Max: 99.8 (02-22-19 @ 23:37)  HR: 75 (02-23-19 @ 07:45)  BP: 123/68 (02-23-19 @ 07:45)  RR: 18 (02-23-19 @ 07:45)  SpO2: 91% (02-23-19 @ 07:45)  Wt(kg): --  General: Nontoxic-appearing Male in no acute distress.  HEENT: AT/NC. Anicteric. Conjunctiva pink and moist. Oropharynx clear.  Neck: Not rigid. No sense of mass.  Nodes: None palpable.  Lungs: Diminished breath sounds bilaterally without rales, wheezing or rhonchi  Heart: Regular rate and rhythm. No Murmur. No rub. No gallop. No palpable thrill.  Abdomen: Bowel sounds present and normoactive. Soft. Nondistended. Mild tenderness RLQ without guarding or rebound. No sense of mass. No organomegaly. Obese.   Extremities: No cyanosis or clubbing. No edema.   Skin: Warm. Dry. Good turgor. No rash. No vasculitic stigmata.  Psychiatric: Appropriate affect and mood for situation.         Laboratory Studies--  CBC                        9.0    9.61  )-----------( 180      ( 23 Feb 2019 06:01 )             28.7       Chemistries  02-23    141  |  107  |  8   ----------------------------<  109<H>  3.3<L>   |  25  |  0.78    Ca    8.2<L>      23 Feb 2019 06:01  Phos  2.9     02-23  Mg     2.1     02-22        Culture Data    Culture - Blood (collected 19 Feb 2019 19:27)  Source: .Blood Blood  Preliminary Report (20 Feb 2019 20:00):    No growth to date.    Culture - Blood (collected 19 Feb 2019 19:27)  Source: .Blood Blood  Preliminary Report (20 Feb 2019 20:00):    No growth to date.      < from: CT Abdomen and Pelvis w/ Oral Cont and w/ IV Cont (02.22.19 @ 10:46) >  MPRESSION: Redemonstration of perforated diverticulitis involving the   distal sigmoid colon with adjacent fluid and inflammatory changes.   Overall findings appear slightly worsened when compared with the prior CT   exam from 2/19/2019.    Interval development of trace bilateral pleural effusions.    < end of copied text >

## 2019-02-23 NOTE — PROGRESS NOTE ADULT - ATTENDING COMMENTS
I have seen the pt this AM. He is very comfortable and denies any abdominal discomfort. CT reviewed labs reviewed.  Plan is to continue antibiotics. Eventually pt should follow up c Dr. Perez re possible surgery

## 2019-02-23 NOTE — PROGRESS NOTE ADULT - SUBJECTIVE AND OBJECTIVE BOX
INTERVAL HPI/OVERNIGHT EVENTS:  pt seen and examined  pain better, denies n/v  repeat ct noted    MEDICATIONS  (STANDING):  amLODIPine   Tablet 10 milliGRAM(s) Oral daily  ARIPiprazole 20 milliGRAM(s) Oral daily  buDESOnide 160 MICROgram(s)/formoterol 4.5 MICROgram(s) Inhaler 2 Puff(s) Inhalation two times a day  buPROPion  milliGRAM(s) Oral two times a day  cholestyramine Powder (Sugar-Free) 4 Gram(s) Oral daily  clonazePAM Tablet 1 milliGRAM(s) Oral three times a day  dextrose 5% + sodium chloride 0.45% with potassium chloride 20 mEq/L 1000 milliLiter(s) (75 mL/Hr) IV Continuous <Continuous>  fluticasone propionate 50 MICROgram(s)/spray Nasal Spray 1 Spray(s) Both Nostrils daily  heparin  Injectable 5000 Unit(s) SubCutaneous every 8 hours  iohexol 300 mG (iodine)/mL Oral Solution 500 milliLiter(s) Oral every 1 hour  lactobacillus acidophilus 1 Tablet(s) Oral three times a day with meals  pantoprazole    Tablet 40 milliGRAM(s) Oral two times a day  piperacillin/tazobactam IVPB. 3.375 Gram(s) IV Intermittent every 8 hours  potassium phosphate IVPB 30 milliMole(s) IV Intermittent once  sertraline 200 milliGRAM(s) Oral daily  simvastatin 40 milliGRAM(s) Oral at bedtime  tiotropium 18 MICROgram(s) Capsule 1 Capsule(s) Inhalation daily  traZODone 200 milliGRAM(s) Oral daily    MEDICATIONS  (PRN):  acetaminophen   Tablet .. 650 milliGRAM(s) Oral every 6 hours PRN Temp greater or equal to 38C (100.4F)  ALBUTerol    90 MICROgram(s) HFA Inhaler 2 Puff(s) Inhalation every 6 hours PRN Wheezing      Allergies    latex (Rash)  No Known Drug Allergies    Intolerances        Review of Systems:    General:  No wt loss, fevers, chills, night sweats, fatigue   Eyes:  Good vision, no reported pain  ENT:  No sore throat, pain, runny nose, dysphagia  CV:  No pain, palpitations, hypo/hypertension  Resp:  No dyspnea, cough, tachypnea, wheezing  GI:  mild pain, No nausea, No vomiting, +diarrhea, No constipation, No weight loss, No fever, No pruritis, No rectal bleeding, No melena, No dysphagia  :  No pain, bleeding, incontinence, nocturia  Muscle:  No pain, weakness  Neuro:  No weakness, tingling, memory problems  Psych:  No fatigue, insomnia, mood problems, depression  Endocrine:  No polyuria, polydypsia, cold/heat intolerance  Heme:  No petechiae, ecchymosis, easy bruisability  Skin:  No rash, tattoos, scars, edema      Vital Signs Last 24 Hrs  T(C): 37 (22 Feb 2019 08:00), Max: 37 (22 Feb 2019 08:00)  T(F): 98.6 (22 Feb 2019 08:00), Max: 98.6 (22 Feb 2019 08:00)  HR: 77 (22 Feb 2019 08:00) (71 - 79)  BP: 120/70 (22 Feb 2019 08:00) (100/55 - 131/73)  BP(mean): --  RR: 17 (22 Feb 2019 08:00) (17 - 22)  SpO2: 94% (22 Feb 2019 08:00) (93% - 97%)    PHYSICAL EXAM:    General:  nad  HEENT:  NC/AT  Chest:  dec bs  Cardiovascular:  Regular rhythm, S1, S2  Abdomen:  Soft, mild ttp rlq +dt   Extremities:  no edema  Skin:  No rash  Neuro/Psych:  Awake alert responds appropriately        LABS:                        9.0    10.25 )-----------( 190      ( 22 Feb 2019 07:03 )             28.8     02-22    142  |  105  |  13  ----------------------------<  112<H>  3.3<L>   |  28  |  0.85    Ca    8.1<L>      22 Feb 2019 07:03  Phos  1.7     02-22  Mg     2.1     02-22            RADIOLOGY & ADDITIONAL TESTS:

## 2019-02-23 NOTE — PROGRESS NOTE ADULT - SUBJECTIVE AND OBJECTIVE BOX
Date/Time Patient Seen:  		  Referring MD:   Data Reviewed	       Patient is a 63y old  Male who presents with a chief complaint of abdominal pain , diarrhea (22 Feb 2019 15:00)      Subjective/HPI     PAST MEDICAL & SURGICAL HISTORY:  Sleep apnea: does not use CPAP machine  Bakers cyst: Bilateral  Osteoarthrosis, localized  PTSD (post-traumatic stress disorder): September 11th 2001  Chronic rhinosinusitis  GERD (gastroesophageal reflux disease)  Asthma  COPD (chronic obstructive pulmonary disease)  High cholesterol  Hypertension  Bakers cyst: Bilateral removal Bakers Cyst  S/P arthroscopic surgery of left knee  S/P wrist surgery: ganglion cyst  S/P tonsillectomy and adenoidectomy  S/P knee replacement, right        Medication list         MEDICATIONS  (STANDING):  amLODIPine   Tablet 10 milliGRAM(s) Oral daily  ARIPiprazole 20 milliGRAM(s) Oral daily  buDESOnide 160 MICROgram(s)/formoterol 4.5 MICROgram(s) Inhaler 2 Puff(s) Inhalation two times a day  buPROPion  milliGRAM(s) Oral <User Schedule>  cholestyramine Powder (Sugar-Free) 4 Gram(s) Oral daily  clonazePAM Tablet 1 milliGRAM(s) Oral three times a day  dextrose 5% + sodium chloride 0.45% with potassium chloride 20 mEq/L 1000 milliLiter(s) (75 mL/Hr) IV Continuous <Continuous>  fluticasone propionate 50 MICROgram(s)/spray Nasal Spray 1 Spray(s) Both Nostrils daily  heparin  Injectable 5000 Unit(s) SubCutaneous every 8 hours  lactobacillus acidophilus 1 Tablet(s) Oral three times a day with meals  pantoprazole    Tablet 40 milliGRAM(s) Oral two times a day  piperacillin/tazobactam IVPB. 3.375 Gram(s) IV Intermittent every 8 hours  sertraline 200 milliGRAM(s) Oral daily  simvastatin 40 milliGRAM(s) Oral at bedtime  tiotropium 18 MICROgram(s) Capsule 1 Capsule(s) Inhalation daily  traZODone 200 milliGRAM(s) Oral daily    MEDICATIONS  (PRN):  acetaminophen   Tablet .. 650 milliGRAM(s) Oral every 6 hours PRN Temp greater or equal to 38C (100.4F)  ALBUTerol    90 MICROgram(s) HFA Inhaler 2 Puff(s) Inhalation every 6 hours PRN Wheezing         Vitals log        ICU Vital Signs Last 24 Hrs  T(C): 37.7 (22 Feb 2019 23:37), Max: 37.7 (22 Feb 2019 23:37)  T(F): 99.8 (22 Feb 2019 23:37), Max: 99.8 (22 Feb 2019 23:37)  HR: 67 (22 Feb 2019 23:37) (67 - 77)  BP: 121/65 (22 Feb 2019 23:37) (117/75 - 121/65)  BP(mean): --  ABP: --  ABP(mean): --  RR: 17 (22 Feb 2019 23:37) (17 - 18)  SpO2: 89% (22 Feb 2019 23:37) (89% - 94%)           Input and Output:  I&O's Detail    22 Feb 2019 07:01  -  23 Feb 2019 07:00  --------------------------------------------------------  IN:    dextrose 5% + sodium chloride 0.45% with potassium chloride 20 mEq/L: 1200 mL    Solution: 510 mL    Solution: 200 mL  Total IN: 1910 mL    OUT:  Total OUT: 0 mL    Total NET: 1910 mL          Lab Data                        9.0    9.61  )-----------( 180      ( 23 Feb 2019 06:01 )             28.7     02-23    141  |  107  |  8   ----------------------------<  109<H>  3.3<L>   |  25  |  0.78    Ca    8.2<L>      23 Feb 2019 06:01  Phos  2.9     02-23  Mg     2.1     02-22              Review of Systems	      Objective     Physical Examination    heart s1s2  lung dec BS      Pertinent Lab findings & Imaging      Dino:  NO   Adequate UO     I&O's Detail    22 Feb 2019 07:01  -  23 Feb 2019 07:00  --------------------------------------------------------  IN:    dextrose 5% + sodium chloride 0.45% with potassium chloride 20 mEq/L: 1200 mL    Solution: 510 mL    Solution: 200 mL  Total IN: 1910 mL    OUT:  Total OUT: 0 mL    Total NET: 1910 mL               Discussed with:     Cultures:	        Radiology

## 2019-02-23 NOTE — PROGRESS NOTE ADULT - SUBJECTIVE AND OBJECTIVE BOX
Patient is a 63y old  Male who presents with a chief complaint of abdominal pain , diarrhea (23 Feb 2019 09:06)        HPI:  63 m from home hx htn , hx  sleep apnea  night cpap prn set 4 , mood dis/ PTSD , hx copd not home bound  , hx diverticulosis , hx of bl knee replacement , hx gerd . pt   came to  ed   1 day  c/o of  abdominal pain lt lower  side  with   nonbloody diarrhea  associated with nausea  but  no vomiting  , no fever  . pt said still tolerated his meal yesterday  , pt had no fever or  no other associated symptom .  pt admitted with microperforation with sigmoid diverticulitis   pt had  endo and colonoscopy done 2 y ago found  to have diverticulosis , pt state no hx gi bleed , no hx gastric ulcer  .   in ed Impression:    CT findings as discussed with small localized fluid collection right   lower quadrant likely secondary to sigmoid diverticulitis with localized   microperforation.  Cannot exclude tip appendicitis.  This finding was discussed with Dr. Saldaña  by telephone at the time of   interpretation on 2/19/2019. (19 Feb 2019 12:58)      SUBJECTIVE & OBJECTIVE: Pt seen and examined at bedside, feeling better     PHYSICAL EXAM:  T(C): 37.2 (02-23-19 @ 07:45), Max: 37.7 (02-22-19 @ 23:37)  HR: 75 (02-23-19 @ 07:45) (67 - 75)  BP: 123/68 (02-23-19 @ 07:45) (117/75 - 123/68)  RR: 18 (02-23-19 @ 07:45) (17 - 18)  SpO2: 91% (02-23-19 @ 07:45) (89% - 93%)  Wt(kg): --   GENERAL: NAD, well-groomed, well-developed  HEAD:  Atraumatic, Normocephalic  NECK: Supple, No JVD  NERVOUS SYSTEM:  Alert & Oriented X3,  CHEST/LUNG: Clear to auscultation bilaterally; No rales, rhonchi, wheezing, or rubs  HEART: Regular rate and rhythm; No murmurs, rubs, or gallops  ABDOMEN: Soft, Nontender, Nondistended; Bowel sounds present  EXTREMITIES:  2+ Peripheral Pulses, No clubbing, cyanosis, or edema        MEDICATIONS  (STANDING):  amLODIPine   Tablet 10 milliGRAM(s) Oral daily  ARIPiprazole 20 milliGRAM(s) Oral daily  buDESOnide 160 MICROgram(s)/formoterol 4.5 MICROgram(s) Inhaler 2 Puff(s) Inhalation two times a day  buPROPion  milliGRAM(s) Oral <User Schedule>  cholestyramine Powder (Sugar-Free) 4 Gram(s) Oral daily  clonazePAM Tablet 1 milliGRAM(s) Oral three times a day  dextrose 5% + sodium chloride 0.45% with potassium chloride 20 mEq/L 1000 milliLiter(s) (75 mL/Hr) IV Continuous <Continuous>  fluticasone propionate 50 MICROgram(s)/spray Nasal Spray 1 Spray(s) Both Nostrils daily  heparin  Injectable 5000 Unit(s) SubCutaneous every 8 hours  lactobacillus acidophilus 1 Tablet(s) Oral three times a day with meals  pantoprazole    Tablet 40 milliGRAM(s) Oral two times a day  piperacillin/tazobactam IVPB. 3.375 Gram(s) IV Intermittent every 8 hours  sertraline 200 milliGRAM(s) Oral daily  simvastatin 40 milliGRAM(s) Oral at bedtime  tiotropium 18 MICROgram(s) Capsule 1 Capsule(s) Inhalation daily  traZODone 200 milliGRAM(s) Oral daily    MEDICATIONS  (PRN):  acetaminophen   Tablet .. 650 milliGRAM(s) Oral every 6 hours PRN Temp greater or equal to 38C (100.4F)  ALBUTerol    90 MICROgram(s) HFA Inhaler 2 Puff(s) Inhalation every 6 hours PRN Wheezing      LABS:                        9.0    9.61  )-----------( 180      ( 23 Feb 2019 06:01 )             28.7     02-23    141  |  107  |  8   ----------------------------<  109<H>  3.3<L>   |  25  |  0.78    Ca    8.2<L>      23 Feb 2019 06:01  Phos  2.9     02-23  Mg     2.1     02-22            CAPILLARY BLOOD GLUCOSE          CAPILLARY BLOOD GLUCOSE        CAPILLARY BLOOD GLUCOSE                RECENT CULTURES:      RADIOLOGY & ADDITIONAL TESTS:                        DVT/GI ppx  Discussed with pt @ bedside

## 2019-02-24 LAB
ALBUMIN SERPL ELPH-MCNC: 2.4 G/DL — LOW (ref 3.3–5)
ALP SERPL-CCNC: 58 U/L — SIGNIFICANT CHANGE UP (ref 40–120)
ALT FLD-CCNC: 35 U/L — SIGNIFICANT CHANGE UP (ref 12–78)
ANION GAP SERPL CALC-SCNC: 8 MMOL/L — SIGNIFICANT CHANGE UP (ref 5–17)
AST SERPL-CCNC: 31 U/L — SIGNIFICANT CHANGE UP (ref 15–37)
BILIRUB SERPL-MCNC: 0.5 MG/DL — SIGNIFICANT CHANGE UP (ref 0.2–1.2)
BUN SERPL-MCNC: 7 MG/DL — SIGNIFICANT CHANGE UP (ref 7–23)
CALCIUM SERPL-MCNC: 8.3 MG/DL — LOW (ref 8.5–10.1)
CHLORIDE SERPL-SCNC: 107 MMOL/L — SIGNIFICANT CHANGE UP (ref 96–108)
CO2 SERPL-SCNC: 30 MMOL/L — SIGNIFICANT CHANGE UP (ref 22–31)
CREAT SERPL-MCNC: 0.92 MG/DL — SIGNIFICANT CHANGE UP (ref 0.5–1.3)
CULTURE RESULTS: SIGNIFICANT CHANGE UP
GLUCOSE SERPL-MCNC: 119 MG/DL — HIGH (ref 70–99)
HCT VFR BLD CALC: 28.2 % — LOW (ref 39–50)
HGB BLD-MCNC: 8.8 G/DL — LOW (ref 13–17)
IRON SATN MFR SERPL: 18 UG/DL — LOW (ref 45–165)
IRON SATN MFR SERPL: 8 % — LOW (ref 16–55)
MCHC RBC-ENTMCNC: 26.3 PG — LOW (ref 27–34)
MCHC RBC-ENTMCNC: 31.2 GM/DL — LOW (ref 32–36)
MCV RBC AUTO: 84.2 FL — SIGNIFICANT CHANGE UP (ref 80–100)
NRBC # BLD: 0 /100 WBCS — SIGNIFICANT CHANGE UP (ref 0–0)
PLATELET # BLD AUTO: 220 K/UL — SIGNIFICANT CHANGE UP (ref 150–400)
POTASSIUM SERPL-MCNC: 3.3 MMOL/L — LOW (ref 3.5–5.3)
POTASSIUM SERPL-SCNC: 3.3 MMOL/L — LOW (ref 3.5–5.3)
PROT SERPL-MCNC: 6.8 G/DL — SIGNIFICANT CHANGE UP (ref 6–8.3)
RBC # BLD: 3.35 M/UL — LOW (ref 4.2–5.8)
RBC # FLD: 17.2 % — HIGH (ref 10.3–14.5)
SODIUM SERPL-SCNC: 145 MMOL/L — SIGNIFICANT CHANGE UP (ref 135–145)
SPECIMEN SOURCE: SIGNIFICANT CHANGE UP
TIBC SERPL-MCNC: 214 UG/DL — LOW (ref 220–430)
UIBC SERPL-MCNC: 196 UG/DL — SIGNIFICANT CHANGE UP (ref 110–370)
WBC # BLD: 10.24 K/UL — SIGNIFICANT CHANGE UP (ref 3.8–10.5)
WBC # FLD AUTO: 10.24 K/UL — SIGNIFICANT CHANGE UP (ref 3.8–10.5)

## 2019-02-24 PROCEDURE — 99233 SBSQ HOSP IP/OBS HIGH 50: CPT

## 2019-02-24 PROCEDURE — 99231 SBSQ HOSP IP/OBS SF/LOW 25: CPT

## 2019-02-24 RX ORDER — POTASSIUM CHLORIDE 20 MEQ
40 PACKET (EA) ORAL ONCE
Qty: 0 | Refills: 0 | Status: COMPLETED | OUTPATIENT
Start: 2019-02-24 | End: 2019-02-24

## 2019-02-24 RX ADMIN — Medication 1 TABLET(S): at 09:26

## 2019-02-24 RX ADMIN — HEPARIN SODIUM 5000 UNIT(S): 5000 INJECTION INTRAVENOUS; SUBCUTANEOUS at 06:00

## 2019-02-24 RX ADMIN — Medication 1 TABLET(S): at 12:21

## 2019-02-24 RX ADMIN — SERTRALINE 200 MILLIGRAM(S): 25 TABLET, FILM COATED ORAL at 12:21

## 2019-02-24 RX ADMIN — Medication 1 TABLET(S): at 18:04

## 2019-02-24 RX ADMIN — Medication 1 MILLIGRAM(S): at 13:51

## 2019-02-24 RX ADMIN — HEPARIN SODIUM 5000 UNIT(S): 5000 INJECTION INTRAVENOUS; SUBCUTANEOUS at 22:48

## 2019-02-24 RX ADMIN — CHOLESTYRAMINE 4 GRAM(S): 4 POWDER, FOR SUSPENSION ORAL at 09:26

## 2019-02-24 RX ADMIN — Medication 1 SPRAY(S): at 12:22

## 2019-02-24 RX ADMIN — PIPERACILLIN AND TAZOBACTAM 25 GRAM(S): 4; .5 INJECTION, POWDER, LYOPHILIZED, FOR SOLUTION INTRAVENOUS at 13:51

## 2019-02-24 RX ADMIN — Medication 40 MILLIEQUIVALENT(S): at 18:04

## 2019-02-24 RX ADMIN — PIPERACILLIN AND TAZOBACTAM 25 GRAM(S): 4; .5 INJECTION, POWDER, LYOPHILIZED, FOR SOLUTION INTRAVENOUS at 06:00

## 2019-02-24 RX ADMIN — PANTOPRAZOLE SODIUM 40 MILLIGRAM(S): 20 TABLET, DELAYED RELEASE ORAL at 18:04

## 2019-02-24 RX ADMIN — SIMVASTATIN 40 MILLIGRAM(S): 20 TABLET, FILM COATED ORAL at 22:48

## 2019-02-24 RX ADMIN — BUDESONIDE AND FORMOTEROL FUMARATE DIHYDRATE 2 PUFF(S): 160; 4.5 AEROSOL RESPIRATORY (INHALATION) at 18:05

## 2019-02-24 RX ADMIN — PIPERACILLIN AND TAZOBACTAM 25 GRAM(S): 4; .5 INJECTION, POWDER, LYOPHILIZED, FOR SOLUTION INTRAVENOUS at 22:49

## 2019-02-24 RX ADMIN — Medication 1 MILLIGRAM(S): at 22:49

## 2019-02-24 RX ADMIN — Medication 1 MILLIGRAM(S): at 06:01

## 2019-02-24 RX ADMIN — TIOTROPIUM BROMIDE 1 CAPSULE(S): 18 CAPSULE ORAL; RESPIRATORY (INHALATION) at 12:22

## 2019-02-24 RX ADMIN — DEXTROSE MONOHYDRATE, SODIUM CHLORIDE, AND POTASSIUM CHLORIDE 75 MILLILITER(S): 50; .745; 4.5 INJECTION, SOLUTION INTRAVENOUS at 22:49

## 2019-02-24 RX ADMIN — Medication 200 MILLIGRAM(S): at 20:16

## 2019-02-24 RX ADMIN — BUDESONIDE AND FORMOTEROL FUMARATE DIHYDRATE 2 PUFF(S): 160; 4.5 AEROSOL RESPIRATORY (INHALATION) at 06:00

## 2019-02-24 RX ADMIN — BUPROPION HYDROCHLORIDE 200 MILLIGRAM(S): 150 TABLET, EXTENDED RELEASE ORAL at 22:48

## 2019-02-24 RX ADMIN — HEPARIN SODIUM 5000 UNIT(S): 5000 INJECTION INTRAVENOUS; SUBCUTANEOUS at 13:51

## 2019-02-24 RX ADMIN — ARIPIPRAZOLE 20 MILLIGRAM(S): 15 TABLET ORAL at 12:21

## 2019-02-24 RX ADMIN — PANTOPRAZOLE SODIUM 40 MILLIGRAM(S): 20 TABLET, DELAYED RELEASE ORAL at 06:00

## 2019-02-24 NOTE — PROGRESS NOTE ADULT - SUBJECTIVE AND OBJECTIVE BOX
Patient is a 63y old  Male who presents with a chief complaint of abdominal pain , diarrhea (24 Feb 2019 11:59)        HPI:  63 m from home hx htn , hx  sleep apnea  night cpap prn set 4 , mood dis/ PTSD , hx copd not home bound  , hx diverticulosis , hx of bl knee replacement , hx gerd . pt   came to  ed   1 day  c/o of  abdominal pain lt lower  side  with   nonbloody diarrhea  associated with nausea  but  no vomiting  , no fever  . pt said still tolerated his meal yesterday  , pt had no fever or  no other associated symptom .  pt admitted with microperforation with sigmoid diverticulitis   pt had  endo and colonoscopy done 2 y ago found  to have diverticulosis , pt state no hx gi bleed , no hx gastric ulcer  .   in ed Impression:    CT findings as discussed with small localized fluid collection right   lower quadrant likely secondary to sigmoid diverticulitis with localized   microperforation.  Cannot exclude tip appendicitis.  This finding was discussed with Dr. Saldaña  by telephone at the time of   interpretation on 2/19/2019. (19 Feb 2019 12:58)      SUBJECTIVE & OBJECTIVE: Pt seen and examined at bedside, no acute complaints     PHYSICAL EXAM:  T(C): 36.8 (02-24-19 @ 07:59), Max: 37.3 (02-24-19 @ 00:37)  HR: 69 (02-24-19 @ 07:59) (55 - 72)  BP: 98/64 (02-24-19 @ 07:59) (98/64 - 111/71)  RR: 20 (02-24-19 @ 07:59) (16 - 20)  SpO2: 92% (02-24-19 @ 07:59) (91% - 96%)  Wt(kg): --   GENERAL: NAD, well-groomed, well-developed  HEAD:  Atraumatic, Normocephalic  EYES: EOMI, PERRLA, conjunctiva and sclera clear  ENMT: Moist mucous membranes  NECK: Supple, No JVD  NERVOUS SYSTEM:  Alert & Oriented X3, Motor Strength 5/5 B/L upper and lower extremities; DTRs 2+ intact and symmetric  CHEST/LUNG: Clear to auscultation bilaterally; No rales, rhonchi, wheezing, or rubs  HEART: Regular rate and rhythm; No murmurs, rubs, or gallops  ABDOMEN: Soft, Nontender, Nondistended; Bowel sounds present  EXTREMITIES:  2+ Peripheral Pulses, No clubbing, cyanosis, or edema        MEDICATIONS  (STANDING):  amLODIPine   Tablet 10 milliGRAM(s) Oral daily  ARIPiprazole 20 milliGRAM(s) Oral daily  buDESOnide 160 MICROgram(s)/formoterol 4.5 MICROgram(s) Inhaler 2 Puff(s) Inhalation two times a day  buPROPion  milliGRAM(s) Oral <User Schedule>  cholestyramine Powder (Sugar-Free) 4 Gram(s) Oral daily  clonazePAM Tablet 1 milliGRAM(s) Oral three times a day  dextrose 5% + sodium chloride 0.45% with potassium chloride 20 mEq/L 1000 milliLiter(s) (75 mL/Hr) IV Continuous <Continuous>  fluticasone propionate 50 MICROgram(s)/spray Nasal Spray 1 Spray(s) Both Nostrils daily  heparin  Injectable 5000 Unit(s) SubCutaneous every 8 hours  hydrocortisone hemorrhoidal Suppository 1 Suppository(s) Rectal once  lactobacillus acidophilus 1 Tablet(s) Oral three times a day with meals  pantoprazole    Tablet 40 milliGRAM(s) Oral two times a day  piperacillin/tazobactam IVPB. 3.375 Gram(s) IV Intermittent every 8 hours  potassium chloride    Tablet ER 40 milliEquivalent(s) Oral once  sertraline 200 milliGRAM(s) Oral daily  simvastatin 40 milliGRAM(s) Oral at bedtime  tiotropium 18 MICROgram(s) Capsule 1 Capsule(s) Inhalation daily  traZODone 200 milliGRAM(s) Oral daily    MEDICATIONS  (PRN):  acetaminophen   Tablet .. 650 milliGRAM(s) Oral every 6 hours PRN Temp greater or equal to 38C (100.4F)  ALBUTerol    90 MICROgram(s) HFA Inhaler 2 Puff(s) Inhalation every 6 hours PRN Wheezing      LABS:                        8.8    10.24 )-----------( 220      ( 24 Feb 2019 05:51 )             28.2     02-24    145  |  107  |  7   ----------------------------<  119<H>  3.3<L>   |  30  |  0.92    Ca    8.3<L>      24 Feb 2019 05:51  Phos  2.9     02-23    TPro  6.8  /  Alb  2.4<L>  /  TBili  0.5  /  DBili  x   /  AST  31  /  ALT  35  /  AlkPhos  58  02-24          CAPILLARY BLOOD GLUCOSE          CAPILLARY BLOOD GLUCOSE        CAPILLARY BLOOD GLUCOSE                RECENT CULTURES:      RADIOLOGY & ADDITIONAL TESTS:                        DVT/GI ppx  Discussed with pt @ bedside

## 2019-02-24 NOTE — PROGRESS NOTE ADULT - SUBJECTIVE AND OBJECTIVE BOX
Date/Time Patient Seen:  		  Referring MD:   Data Reviewed	       Patient is a 63y old  Male who presents with a chief complaint of abdominal pain , diarrhea (23 Feb 2019 11:28)      Subjective/HPI     PAST MEDICAL & SURGICAL HISTORY:  Sleep apnea: does not use CPAP machine  Bakers cyst: Bilateral  Osteoarthrosis, localized  PTSD (post-traumatic stress disorder): September 11th 2001  Chronic rhinosinusitis  GERD (gastroesophageal reflux disease)  Asthma  COPD (chronic obstructive pulmonary disease)  High cholesterol  Hypertension  Bakers cyst: Bilateral removal Bakers Cyst  S/P arthroscopic surgery of left knee  S/P wrist surgery: ganglion cyst  S/P tonsillectomy and adenoidectomy  S/P knee replacement, right        Medication list         MEDICATIONS  (STANDING):  amLODIPine   Tablet 10 milliGRAM(s) Oral daily  ARIPiprazole 20 milliGRAM(s) Oral daily  buDESOnide 160 MICROgram(s)/formoterol 4.5 MICROgram(s) Inhaler 2 Puff(s) Inhalation two times a day  buPROPion  milliGRAM(s) Oral <User Schedule>  cholestyramine Powder (Sugar-Free) 4 Gram(s) Oral daily  clonazePAM Tablet 1 milliGRAM(s) Oral three times a day  dextrose 5% + sodium chloride 0.45% with potassium chloride 20 mEq/L 1000 milliLiter(s) (75 mL/Hr) IV Continuous <Continuous>  fluticasone propionate 50 MICROgram(s)/spray Nasal Spray 1 Spray(s) Both Nostrils daily  heparin  Injectable 5000 Unit(s) SubCutaneous every 8 hours  hydrocortisone hemorrhoidal Suppository 1 Suppository(s) Rectal once  lactobacillus acidophilus 1 Tablet(s) Oral three times a day with meals  pantoprazole    Tablet 40 milliGRAM(s) Oral two times a day  piperacillin/tazobactam IVPB. 3.375 Gram(s) IV Intermittent every 8 hours  sertraline 200 milliGRAM(s) Oral daily  simvastatin 40 milliGRAM(s) Oral at bedtime  tiotropium 18 MICROgram(s) Capsule 1 Capsule(s) Inhalation daily  traZODone 200 milliGRAM(s) Oral daily    MEDICATIONS  (PRN):  acetaminophen   Tablet .. 650 milliGRAM(s) Oral every 6 hours PRN Temp greater or equal to 38C (100.4F)  ALBUTerol    90 MICROgram(s) HFA Inhaler 2 Puff(s) Inhalation every 6 hours PRN Wheezing         Vitals log        ICU Vital Signs Last 24 Hrs  T(C): 37.3 (24 Feb 2019 00:37), Max: 37.3 (24 Feb 2019 00:37)  T(F): 99.1 (24 Feb 2019 00:37), Max: 99.1 (24 Feb 2019 00:37)  HR: 72 (24 Feb 2019 06:11) (55 - 75)  BP: 108/60 (24 Feb 2019 06:11) (105/66 - 123/68)  BP(mean): --  ABP: --  ABP(mean): --  RR: 16 (24 Feb 2019 00:37) (16 - 18)  SpO2: 96% (24 Feb 2019 00:37) (91% - 96%)           Input and Output:  I&O's Detail      Lab Data                        8.8    10.24 )-----------( 220      ( 24 Feb 2019 05:51 )             28.2     02-24    145  |  107  |  7   ----------------------------<  119<H>  3.3<L>   |  30  |  0.92    Ca    8.3<L>      24 Feb 2019 05:51  Phos  2.9     02-23    TPro  6.8  /  Alb  2.4<L>  /  TBili  0.5  /  DBili  x   /  AST  31  /  ALT  35  /  AlkPhos  58  02-24            Review of Systems	      Objective     Physical Examination        Pertinent Lab findings & Imaging      Dino:  NO   Adequate UO     I&O's Detail           Discussed with:     Cultures:	        Radiology

## 2019-02-24 NOTE — PROGRESS NOTE ADULT - SUBJECTIVE AND OBJECTIVE BOX
JOHNMARKCIROELTON  [id number] 63y    GENERAL SURGERY/ DR. EDWARDS    NO FEVER, CHILLS, N/V, ABDOMINAL PAIN  TOLERATING CLEAR LIQUID DIET  T MAX 99.1 NOTED     Vital Signs Last 24 Hrs  T(C): 36.8 (24 Feb 2019 07:59), Max: 37.3 (24 Feb 2019 00:37)  T(F): 98.3 (24 Feb 2019 07:59), Max: 99.1 (24 Feb 2019 00:37)  HR: 69 (24 Feb 2019 07:59) (55 - 72)  BP: 98/64 (24 Feb 2019 07:59) (98/64 - 111/71)  BP(mean): --  RR: 20 (24 Feb 2019 07:59) (16 - 20)  SpO2: 92% (24 Feb 2019 07:59) (91% - 96%)      ABDOMEN: OBESE, PROTUBERANT, SOFT, MILD TENDERNESS TO DEEP RLQ, NO REBOUND/ GUARDING                                                8.8    10.24 )-----------( 220      ( 24 Feb 2019 05:51 )             28.2      02-24    145  |  107  |  7   ----------------------------<  119<H>  3.3<L>   |  30  |  0.92    Ca    8.3<L>      24 Feb 2019 05:51  Phos  2.9     02-23    TPro  6.8  /  Alb  2.4<L>  /  TBili  0.5  /  DBili  x   /  AST  31  /  ALT  35  /  AlkPhos  58  02-24                          ASSESSMENT &  PLAN:  PERFORATED SIGMOID DIVERTICULITIS     MAINTAIN CLEAR LIQUID DIET   CONTINUE ZOSYN  OOB AND AMBULATE  OBSERVE  F/U TEMP AND WBC   ID F/U NOTED   MAY NEED REPEAT CT OF THE ABDOMEN AND PELVIS   MEDICAL MANAGEMENT  AS PER PRIMARY TEAM

## 2019-02-25 LAB
ANION GAP SERPL CALC-SCNC: 7 MMOL/L — SIGNIFICANT CHANGE UP (ref 5–17)
BUN SERPL-MCNC: 6 MG/DL — LOW (ref 7–23)
CALCIUM SERPL-MCNC: 8.6 MG/DL — SIGNIFICANT CHANGE UP (ref 8.5–10.1)
CHLORIDE SERPL-SCNC: 108 MMOL/L — SIGNIFICANT CHANGE UP (ref 96–108)
CO2 SERPL-SCNC: 30 MMOL/L — SIGNIFICANT CHANGE UP (ref 22–31)
CREAT SERPL-MCNC: 0.99 MG/DL — SIGNIFICANT CHANGE UP (ref 0.5–1.3)
GLUCOSE SERPL-MCNC: 107 MG/DL — HIGH (ref 70–99)
HCT VFR BLD CALC: 30.4 % — LOW (ref 39–50)
HGB BLD-MCNC: 9.4 G/DL — LOW (ref 13–17)
MCHC RBC-ENTMCNC: 26.5 PG — LOW (ref 27–34)
MCHC RBC-ENTMCNC: 30.9 GM/DL — LOW (ref 32–36)
MCV RBC AUTO: 85.6 FL — SIGNIFICANT CHANGE UP (ref 80–100)
NRBC # BLD: 0 /100 WBCS — SIGNIFICANT CHANGE UP (ref 0–0)
PLATELET # BLD AUTO: 261 K/UL — SIGNIFICANT CHANGE UP (ref 150–400)
POTASSIUM SERPL-MCNC: 3.9 MMOL/L — SIGNIFICANT CHANGE UP (ref 3.5–5.3)
POTASSIUM SERPL-SCNC: 3.9 MMOL/L — SIGNIFICANT CHANGE UP (ref 3.5–5.3)
RBC # BLD: 3.55 M/UL — LOW (ref 4.2–5.8)
RBC # FLD: 17.2 % — HIGH (ref 10.3–14.5)
SODIUM SERPL-SCNC: 145 MMOL/L — SIGNIFICANT CHANGE UP (ref 135–145)
WBC # BLD: 10 K/UL — SIGNIFICANT CHANGE UP (ref 3.8–10.5)
WBC # FLD AUTO: 10 K/UL — SIGNIFICANT CHANGE UP (ref 3.8–10.5)

## 2019-02-25 PROCEDURE — 99231 SBSQ HOSP IP/OBS SF/LOW 25: CPT

## 2019-02-25 PROCEDURE — 99232 SBSQ HOSP IP/OBS MODERATE 35: CPT

## 2019-02-25 RX ORDER — CHOLESTYRAMINE 4 G/9G
4 POWDER, FOR SUSPENSION ORAL
Qty: 0 | Refills: 0 | Status: DISCONTINUED | OUTPATIENT
Start: 2019-02-25 | End: 2019-02-27

## 2019-02-25 RX ORDER — CLONAZEPAM 1 MG
1 TABLET ORAL THREE TIMES A DAY
Qty: 0 | Refills: 0 | Status: DISCONTINUED | OUTPATIENT
Start: 2019-02-27 | End: 2019-02-27

## 2019-02-25 RX ADMIN — Medication 200 MILLIGRAM(S): at 12:17

## 2019-02-25 RX ADMIN — Medication 1 SPRAY(S): at 12:16

## 2019-02-25 RX ADMIN — PIPERACILLIN AND TAZOBACTAM 25 GRAM(S): 4; .5 INJECTION, POWDER, LYOPHILIZED, FOR SOLUTION INTRAVENOUS at 07:02

## 2019-02-25 RX ADMIN — AMLODIPINE BESYLATE 10 MILLIGRAM(S): 2.5 TABLET ORAL at 07:02

## 2019-02-25 RX ADMIN — SERTRALINE 200 MILLIGRAM(S): 25 TABLET, FILM COATED ORAL at 12:16

## 2019-02-25 RX ADMIN — PANTOPRAZOLE SODIUM 40 MILLIGRAM(S): 20 TABLET, DELAYED RELEASE ORAL at 17:03

## 2019-02-25 RX ADMIN — ARIPIPRAZOLE 20 MILLIGRAM(S): 15 TABLET ORAL at 12:17

## 2019-02-25 RX ADMIN — PANTOPRAZOLE SODIUM 40 MILLIGRAM(S): 20 TABLET, DELAYED RELEASE ORAL at 07:02

## 2019-02-25 RX ADMIN — Medication 1 MILLIGRAM(S): at 13:22

## 2019-02-25 RX ADMIN — Medication 1 TABLET(S): at 12:16

## 2019-02-25 RX ADMIN — HEPARIN SODIUM 5000 UNIT(S): 5000 INJECTION INTRAVENOUS; SUBCUTANEOUS at 07:02

## 2019-02-25 RX ADMIN — HEPARIN SODIUM 5000 UNIT(S): 5000 INJECTION INTRAVENOUS; SUBCUTANEOUS at 22:07

## 2019-02-25 RX ADMIN — PIPERACILLIN AND TAZOBACTAM 25 GRAM(S): 4; .5 INJECTION, POWDER, LYOPHILIZED, FOR SOLUTION INTRAVENOUS at 22:08

## 2019-02-25 RX ADMIN — BUDESONIDE AND FORMOTEROL FUMARATE DIHYDRATE 2 PUFF(S): 160; 4.5 AEROSOL RESPIRATORY (INHALATION) at 07:02

## 2019-02-25 RX ADMIN — PIPERACILLIN AND TAZOBACTAM 25 GRAM(S): 4; .5 INJECTION, POWDER, LYOPHILIZED, FOR SOLUTION INTRAVENOUS at 13:18

## 2019-02-25 RX ADMIN — CHOLESTYRAMINE 4 GRAM(S): 4 POWDER, FOR SUSPENSION ORAL at 22:08

## 2019-02-25 RX ADMIN — TIOTROPIUM BROMIDE 1 CAPSULE(S): 18 CAPSULE ORAL; RESPIRATORY (INHALATION) at 12:17

## 2019-02-25 RX ADMIN — Medication 1 MILLIGRAM(S): at 08:34

## 2019-02-25 RX ADMIN — DEXTROSE MONOHYDRATE, SODIUM CHLORIDE, AND POTASSIUM CHLORIDE 75 MILLILITER(S): 50; .745; 4.5 INJECTION, SOLUTION INTRAVENOUS at 13:17

## 2019-02-25 RX ADMIN — Medication 1 MILLIGRAM(S): at 22:16

## 2019-02-25 RX ADMIN — HEPARIN SODIUM 5000 UNIT(S): 5000 INJECTION INTRAVENOUS; SUBCUTANEOUS at 13:22

## 2019-02-25 RX ADMIN — Medication 1 TABLET(S): at 08:34

## 2019-02-25 RX ADMIN — CHOLESTYRAMINE 4 GRAM(S): 4 POWDER, FOR SUSPENSION ORAL at 08:48

## 2019-02-25 RX ADMIN — Medication 1 TABLET(S): at 17:03

## 2019-02-25 RX ADMIN — SIMVASTATIN 40 MILLIGRAM(S): 20 TABLET, FILM COATED ORAL at 22:08

## 2019-02-25 RX ADMIN — BUPROPION HYDROCHLORIDE 200 MILLIGRAM(S): 150 TABLET, EXTENDED RELEASE ORAL at 22:08

## 2019-02-25 NOTE — CHART NOTE - NSCHARTNOTEFT_GEN_A_CORE
Upon Nutritional Assessment by the Registered Dietitian your patient was determined to meet criteria / has evidence of the following diagnosis/diagnoses:          [ x]  Mild Protein Calorie Malnutrition        [ ]  Moderate Protein Calorie Malnutrition        [ ] Severe Protein Calorie Malnutrition        [ ] Unspecified Protein Calorie Malnutrition        [ ] Underweight / BMI <19        [ ] Morbid Obesity / BMI > 40      Findings as based on:  •  Comprehensive nutrition assessment and consultation  •  Calorie counts (nutrient intake analysis)  •  Food acceptance and intake status from observations by staff  •  Follow up  •  Patient education    Pt meets criteria for mild protein calorie malnutrition in context of acute illness as evidenced by:  * NPO/CL diet x 7 days (consuming less than 75% estimated energy needs for past week)  *mild fluid accumulation, lizzy feet/ankles 1+ edema   *no significant weight changes since admission however may be masked by fluid changes       Treatment:    The following diet has been recommended:  *Clear liquid diet with ensure clear BID, prosource BID   *Advance as tolerated per surgery/GI to Dash/TLC, low fiber, gerd precautions   *MVI w/ minerals daily     PROVIDER Section:     By signing this assessment you are acknowledging and agree with the diagnosis/diagnoses assigned by the Registered Dietitian    Comments:

## 2019-02-25 NOTE — PROGRESS NOTE ADULT - SUBJECTIVE AND OBJECTIVE BOX
infectious diseases progress note:    ELTON IVERSON is a 63y y. o. Male patient    Patient reports: the pain is gone    ROS:    EYES:  Negative  blurry vision or double vision  GASTROINTESTINAL:  Negative for nausea, vomiting, diarrhea  -otherwise negative except for subjective    Allergies    latex (Rash)  No Known Drug Allergies    Intolerances        ANTIBIOTICS/RELEVANT:  antimicrobials  piperacillin/tazobactam IVPB. 3.375 Gram(s) IV Intermittent every 8 hours    immunologic:    OTHER:  acetaminophen   Tablet .. 650 milliGRAM(s) Oral every 6 hours PRN  ALBUTerol    90 MICROgram(s) HFA Inhaler 2 Puff(s) Inhalation every 6 hours PRN  amLODIPine   Tablet 10 milliGRAM(s) Oral daily  ARIPiprazole 20 milliGRAM(s) Oral daily  bismuth subsalicylate Liquid 30 milliLiter(s) Oral three times a day PRN  buDESOnide 160 MICROgram(s)/formoterol 4.5 MICROgram(s) Inhaler 2 Puff(s) Inhalation two times a day  buPROPion  milliGRAM(s) Oral <User Schedule>  cholestyramine Powder (Sugar-Free) 4 Gram(s) Oral two times a day  clonazePAM Tablet 1 milliGRAM(s) Oral three times a day  dextrose 5% + sodium chloride 0.45% with potassium chloride 20 mEq/L 1000 milliLiter(s) IV Continuous <Continuous>  fluticasone propionate 50 MICROgram(s)/spray Nasal Spray 1 Spray(s) Both Nostrils daily  heparin  Injectable 5000 Unit(s) SubCutaneous every 8 hours  hydrocortisone hemorrhoidal Suppository 1 Suppository(s) Rectal once  lactobacillus acidophilus 1 Tablet(s) Oral three times a day with meals  pantoprazole    Tablet 40 milliGRAM(s) Oral two times a day  sertraline 200 milliGRAM(s) Oral daily  simvastatin 40 milliGRAM(s) Oral at bedtime  tiotropium 18 MICROgram(s) Capsule 1 Capsule(s) Inhalation daily  traZODone 200 milliGRAM(s) Oral daily      Objective:  Vital Signs Last 24 Hrs  T(C): 37.1 (25 Feb 2019 08:06), Max: 37.1 (24 Feb 2019 16:21)  T(F): 98.8 (25 Feb 2019 08:06), Max: 98.8 (25 Feb 2019 08:06)  HR: 67 (25 Feb 2019 08:06) (61 - 69)  BP: 124/75 (25 Feb 2019 08:06) (121/76 - 131/72)  BP(mean): --  RR: 18 (25 Feb 2019 08:06) (18 - 18)  SpO2: 94% (25 Feb 2019 08:06) (94% - 98%)    T(C): 37.1 (02-25-19 @ 08:06), Max: 37.3 (02-24-19 @ 00:37)  T(C): 37.1 (02-25-19 @ 08:06), Max: 37.7 (02-22-19 @ 23:37)  T(C): 37.1 (02-25-19 @ 08:06), Max: 37.7 (02-22-19 @ 23:37)    PHYSICAL EXAM:  Constitutional: Well-developed, well nourished  Eyes: PERRLA, EOMI  Ear/Nose/Throat: oropharynx normal	  Neck: no JVD, no lymphadenopathy, supple  Respiratory: no accessory muscle use  Cardiovascular: RRR,   Gastrointestinal: soft, NT  Extremities: no clubbing, no cyanosis, edema absent      LABS:                        9.4    10.00 )-----------( 261      ( 25 Feb 2019 07:14 )             30.4       10.00 02-25 @ 07:14  10.24 02-24 @ 05:51  9.61 02-23 @ 06:01  10.25 02-22 @ 07:03  10.72 02-21 @ 07:10  14.48 02-20 @ 06:25  14.61 02-19 @ 07:57      02-25    145  |  108  |  6<L>  ----------------------------<  107<H>  3.9   |  30  |  0.99    Ca    8.6      25 Feb 2019 07:14    TPro  6.8  /  Alb  2.4<L>  /  TBili  0.5  /  DBili  x   /  AST  31  /  ALT  35  /  AlkPhos  58  02-24      Creatinine, Serum: 0.99 mg/dL (02-25-19 @ 07:14)  Creatinine, Serum: 0.92 mg/dL (02-24-19 @ 05:51)  Creatinine, Serum: 0.78 mg/dL (02-23-19 @ 06:01)  Creatinine, Serum: 0.85 mg/dL (02-22-19 @ 07:03)  Creatinine, Serum: 0.99 mg/dL (02-21-19 @ 07:10)  Creatinine, Serum: 1.30 mg/dL (02-20-19 @ 06:25)  Creatinine, Serum: 1.20 mg/dL (02-19-19 @ 07:57)                MICROBIOLOGY:  Culture Results:   Enteropathogenic E. coli (EPEC)  DETECTED by PCR  *******Please Note:*******  GI panel PCR evaluates for:  Campylobacter, Plesiomonas shigelloides, Salmonella,  Vibrio, Yersinia enterocolitica, Enteroaggregative  Escherichia coli (EAEC), Enteropathogenic E.coli (EPEC),  Enterotoxigenic E. coli (ETEC) lt/st, Shiga-like  toxin-producing E. coli (STEC) stx1/stx2,  Shigella/ Enteroinvasive E. coli (EIEC), Cryptosporidium,  Cyclospora cayetanensis, Entamoeba histolytica,  Giardia lamblia, AdenovirusF 40/41, Astrovirus,  Norovirus GI/GII, Rotavirus A, Sapovirus (02-24 @ 14:21)              RADIOLOGY & ADDITIONAL STUDIES:

## 2019-02-25 NOTE — PROGRESS NOTE ADULT - SUBJECTIVE AND OBJECTIVE BOX
INTERVAL HPI/OVERNIGHT EVENTS:  pt seen and examined  denies n/v/abd pain  reports loose stools  tolerating liquids  afebrile overnight labs noted    MEDICATIONS  (STANDING):  amLODIPine   Tablet 10 milliGRAM(s) Oral daily  ARIPiprazole 20 milliGRAM(s) Oral daily  buDESOnide 160 MICROgram(s)/formoterol 4.5 MICROgram(s) Inhaler 2 Puff(s) Inhalation two times a day  buPROPion  milliGRAM(s) Oral <User Schedule>  cholestyramine Powder (Sugar-Free) 4 Gram(s) Oral daily  clonazePAM Tablet 1 milliGRAM(s) Oral three times a day  dextrose 5% + sodium chloride 0.45% with potassium chloride 20 mEq/L 1000 milliLiter(s) (75 mL/Hr) IV Continuous <Continuous>  fluticasone propionate 50 MICROgram(s)/spray Nasal Spray 1 Spray(s) Both Nostrils daily  heparin  Injectable 5000 Unit(s) SubCutaneous every 8 hours  hydrocortisone hemorrhoidal Suppository 1 Suppository(s) Rectal once  lactobacillus acidophilus 1 Tablet(s) Oral three times a day with meals  pantoprazole    Tablet 40 milliGRAM(s) Oral two times a day  piperacillin/tazobactam IVPB. 3.375 Gram(s) IV Intermittent every 8 hours  sertraline 200 milliGRAM(s) Oral daily  simvastatin 40 milliGRAM(s) Oral at bedtime  tiotropium 18 MICROgram(s) Capsule 1 Capsule(s) Inhalation daily  traZODone 200 milliGRAM(s) Oral daily    MEDICATIONS  (PRN):  acetaminophen   Tablet .. 650 milliGRAM(s) Oral every 6 hours PRN Temp greater or equal to 38C (100.4F)  ALBUTerol    90 MICROgram(s) HFA Inhaler 2 Puff(s) Inhalation every 6 hours PRN Wheezing      Allergies    latex (Rash)  No Known Drug Allergies    Intolerances        Review of Systems:    General:  No wt loss, fevers, chills, night sweats, fatigue   Eyes:  Good vision, no reported pain  ENT:  No sore throat, pain, runny nose, dysphagia  CV:  No pain, palpitations, hypo/hypertension  Resp:  No dyspnea, cough, tachypnea, wheezing  GI:  No pain, No nausea, No vomiting, +diarrhea, No constipation, No weight loss, No fever, No pruritis, No rectal bleeding, No melena, No dysphagia  :  No pain, bleeding, incontinence, nocturia  Muscle:  No pain, weakness  Neuro:  No weakness, tingling, memory problems  Psych:  No fatigue, insomnia, mood problems, depression  Endocrine:  No polyuria, polydypsia, cold/heat intolerance  Heme:  No petechiae, ecchymosis, easy bruisability  Skin:  No rash, tattoos, scars, edema      Vital Signs Last 24 Hrs  T(C): 37.1 (25 Feb 2019 08:06), Max: 37.1 (24 Feb 2019 16:21)  T(F): 98.8 (25 Feb 2019 08:06), Max: 98.8 (25 Feb 2019 08:06)  HR: 67 (25 Feb 2019 08:06) (61 - 69)  BP: 124/75 (25 Feb 2019 08:06) (121/76 - 131/72)  BP(mean): --  RR: 18 (25 Feb 2019 08:06) (18 - 18)  SpO2: 94% (25 Feb 2019 08:06) (94% - 98%)    PHYSICAL EXAM:  General:  nad  HEENT:  NC/AT  Chest:  dec bs  Cardiovascular:  Regular rhythm, S1, S2  Abdomen:  Soft, mild ttp rlq no guarding +dt   Extremities:  no edema  Skin:  No rash  Neuro/Psych:  Awake alert responds appropriately    LABS:                        9.4    10.00 )-----------( 261      ( 25 Feb 2019 07:14 )             30.4     02-25    145  |  108  |  6<L>  ----------------------------<  107<H>  3.9   |  30  |  0.99    Ca    8.6      25 Feb 2019 07:14    TPro  6.8  /  Alb  2.4<L>  /  TBili  0.5  /  DBili  x   /  AST  31  /  ALT  35  /  AlkPhos  58  02-24          RADIOLOGY & ADDITIONAL TESTS:

## 2019-02-25 NOTE — CHART NOTE - NSCHARTNOTEFT_GEN_A_CORE
Assessment/Follow up: Pt A+Ox4 at time of visit. On IVF-D5 1/2 NS +20meqKCl@75cc/hr. Continues on clear liquid diet.  Tolerating well consuming % trays per pt. No report N/V however moderate brownish/yellow loose/liquid BMs 2/24, 2/25. Recommend diet advancement per GI/Surgeon discretion. Recommend full liquid as tolerated and further advanced to Dash/TLC, low fiber, gerd precautions.    Factors impacting intake: [ ] none [ ] nausea  [ ] vomiting [x ] diarrhea [ ] constipation  [ ]chewing problems [ ] swallowing issues  [x ] other: diverticulitis w/ microperforation    Diet Presciption: Diet, Clear Liquid (02-23-19 @ 11:27)    Intake: % clear liquid diet    Current Weight: Weight (kg): 117.9 (02-19 @ 07:33); request current weight to assess.     Pertinent Medications: MEDICATIONS  (STANDING):  amLODIPine   Tablet 10 milliGRAM(s) Oral daily  ARIPiprazole 20 milliGRAM(s) Oral daily  buDESOnide 160 MICROgram(s)/formoterol 4.5 MICROgram(s) Inhaler 2 Puff(s) Inhalation two times a day  buPROPion  milliGRAM(s) Oral <User Schedule>  cholestyramine Powder (Sugar-Free) 4 Gram(s) Oral daily  clonazePAM Tablet 1 milliGRAM(s) Oral three times a day  dextrose 5% + sodium chloride 0.45% with potassium chloride 20 mEq/L 1000 milliLiter(s) (75 mL/Hr) IV Continuous <Continuous>  fluticasone propionate 50 MICROgram(s)/spray Nasal Spray 1 Spray(s) Both Nostrils daily  heparin  Injectable 5000 Unit(s) SubCutaneous every 8 hours  hydrocortisone hemorrhoidal Suppository 1 Suppository(s) Rectal once  lactobacillus acidophilus 1 Tablet(s) Oral three times a day with meals  pantoprazole    Tablet 40 milliGRAM(s) Oral two times a day  piperacillin/tazobactam IVPB. 3.375 Gram(s) IV Intermittent every 8 hours  sertraline 200 milliGRAM(s) Oral daily  simvastatin 40 milliGRAM(s) Oral at bedtime  tiotropium 18 MICROgram(s) Capsule 1 Capsule(s) Inhalation daily  traZODone 200 milliGRAM(s) Oral daily    MEDICATIONS  (PRN):  acetaminophen   Tablet .. 650 milliGRAM(s) Oral every 6 hours PRN Temp greater or equal to 38C (100.4F)  ALBUTerol    90 MICROgram(s) HFA Inhaler 2 Puff(s) Inhalation every 6 hours PRN Wheezing    Pertinent Labs: 02-25 Na145 mmol/L Glu 107 mg/dL<H> K+ 3.9 mmol/L Cr  0.99 mg/dL BUN 6 mg/dL<L> 02-23 Phos 2.9 mg/dL 02-24 Alb 2.4 g/dL<L>     CAPILLARY BLOOD GLUCOSE        Skin: intact. Deven 19.     Estimated Needs:   [ x] no change since previous assessment  [ ] recalculated:     Previous Nutrition Diagnosis:   [ ] Inadequate Energy Intake [ ]Inadequate Oral Intake [ ] Excessive Energy Intake   [ ] Underweight [ ] Increased Nutrient Needs [ ] Overweight/Obesity   [x ] Altered GI Function [ ] Unintended Weight Loss [ ] Food & Nutrition Related Knowledge Deficit [ ] Malnutrition     Nutrition Diagnosis is [x ] ongoing  [ ] resolved [ ] not applicable     New Nutrition Diagnosis: [x ] Malnutrition- moderate protein calorie malnutrition in setting of acute illness as evidenced by NPO/CL diet x 7 days (consuming <75% estimated energy needs > 7 days), mild fluid accumulation lizzy feet 1+edema.       Interventions:   Recommend  [ ] Change Diet To:  [x ] Nutrition Supplement: Ensure clear 8oz po TID per surgeon discretion  [ ] Nutrition Support  [x ] Other: MVI w/ minerals daily.     Monitoring and Evaluation:   [ ] PO intake [ x ] Tolerance to diet prescription [ x ] weights [ x ] labs[ x ] follow up per protocol  [ ] other: Assessment/Follow up: Pt A+Ox4 at time of visit. On IVF-D5 1/2 NS +20meqKCl@75cc/hr. Continues on clear liquid diet. NPO/CL diet x 7 days. Tolerating clear liquids well past few days consuming ~%. No report N/V however moderate brownish/yellow loose/liquid BMs 2/24 & 2/25. Recommend diet advancement per GI/Surgeon discretion. Recommend full liquid as tolerated and further advanced to Dash/TLC, low fiber, gerd precautions.     Factors impacting intake: [ ] none [ ] nausea  [ ] vomiting [x ] diarrhea [ ] constipation  [ ]chewing problems [ ] swallowing issues  [x ] other: diverticulitis w/ microperforation    Diet Presciption: Diet, Clear Liquid (02-23-19 @ 11:27)    Intake: % clear liquid diet    Current Weight: Weight (kg): 117.9 (02-19 @ 07:33); 260lbs (2/25 standing scale), lizzy feet/ankles 1+edema     Pertinent Medications: MEDICATIONS  (STANDING):  amLODIPine   Tablet 10 milliGRAM(s) Oral daily  ARIPiprazole 20 milliGRAM(s) Oral daily  buDESOnide 160 MICROgram(s)/formoterol 4.5 MICROgram(s) Inhaler 2 Puff(s) Inhalation two times a day  buPROPion  milliGRAM(s) Oral <User Schedule>  cholestyramine Powder (Sugar-Free) 4 Gram(s) Oral daily  clonazePAM Tablet 1 milliGRAM(s) Oral three times a day  dextrose 5% + sodium chloride 0.45% with potassium chloride 20 mEq/L 1000 milliLiter(s) (75 mL/Hr) IV Continuous <Continuous>  fluticasone propionate 50 MICROgram(s)/spray Nasal Spray 1 Spray(s) Both Nostrils daily  heparin  Injectable 5000 Unit(s) SubCutaneous every 8 hours  hydrocortisone hemorrhoidal Suppository 1 Suppository(s) Rectal once  lactobacillus acidophilus 1 Tablet(s) Oral three times a day with meals  pantoprazole    Tablet 40 milliGRAM(s) Oral two times a day  piperacillin/tazobactam IVPB. 3.375 Gram(s) IV Intermittent every 8 hours  sertraline 200 milliGRAM(s) Oral daily  simvastatin 40 milliGRAM(s) Oral at bedtime  tiotropium 18 MICROgram(s) Capsule 1 Capsule(s) Inhalation daily  traZODone 200 milliGRAM(s) Oral daily    MEDICATIONS  (PRN):  acetaminophen   Tablet .. 650 milliGRAM(s) Oral every 6 hours PRN Temp greater or equal to 38C (100.4F)  ALBUTerol    90 MICROgram(s) HFA Inhaler 2 Puff(s) Inhalation every 6 hours PRN Wheezing    Pertinent Labs: 02-25 Na145 mmol/L Glu 107 mg/dL<H> K+ 3.9 mmol/L Cr  0.99 mg/dL BUN 6 mg/dL<L> 02-23 Phos 2.9 mg/dL 02-24 Alb 2.4 g/dL<L>     CAPILLARY BLOOD GLUCOSE        Skin: intact. Deven 19.     Estimated Needs:   [ x] no change since previous assessment  [ ] recalculated:     Previous Nutrition Diagnosis:   [ ] Inadequate Energy Intake [ ]Inadequate Oral Intake [ ] Excessive Energy Intake   [ ] Underweight [ ] Increased Nutrient Needs [ ] Overweight/Obesity   [x ] Altered GI Function [ ] Unintended Weight Loss [ ] Food & Nutrition Related Knowledge Deficit [ ] Malnutrition     Nutrition Diagnosis is [x ] ongoing  [ ] resolved [ ] not applicable     New Nutrition Diagnosis: [x ] Malnutrition- mild protein calorie malnutrition in setting of acute illness as evidenced by NPO/CL diet x 7 days, mild fluid accumulation lizzy feet/ankles 1+edema.       Interventions:   Recommend  [x ] Change Diet To: When medically feasible advance diet as tolerated to Dash/TLC, low fiber, gerd precautions.   [x ] Nutrition Supplement: Ensure clear 8oz po BID, prosource 30cc BID while on clear liquid diet.   [ ] Nutrition Support  [x ] Other: MVI w/ minerals daily.     Monitoring and Evaluation:   [x ] PO intake [ x ] Tolerance to diet prescription [ x ] weights [ x ] labs[ x ] follow up per protocol  [ ] other:

## 2019-02-25 NOTE — PROGRESS NOTE ADULT - SUBJECTIVE AND OBJECTIVE BOX
HOSPITAL DAY: 6    SUBJECTIVE:  Patient seen at beside. Patient with no overnight events. Patient with no complaints at this time, pain currently controlled.  Patient tolerating Clear diet, admits to flatus and diarrhea bowel movement at 6am. Patient denies any headaches, chest pain, shortness of breath, palpitations, nausea, vomiting, diarrhea, fevers, chills.    Vital Signs Last 24 Hrs  T(C): 37.1 (25 Feb 2019 08:06), Max: 37.1 (24 Feb 2019 16:21)  T(F): 98.8 (25 Feb 2019 08:06), Max: 98.8 (25 Feb 2019 08:06)  HR: 67 (25 Feb 2019 08:06) (61 - 69)  BP: 124/75 (25 Feb 2019 08:06) (121/76 - 131/72)  BP(mean): --  RR: 18 (25 Feb 2019 08:06) (18 - 18)  SpO2: 94% (25 Feb 2019 08:06) (94% - 98%)    PHYSICAL EXAM:  GENERAL: No acute distress, well-developed  HEAD:  Atraumatic, Normocephalic  ABDOMEN: Soft, mildly tender to deep palpation in RLQ, non-distended; normal bowel sounds  NEUROLOGY: A&O x 3, no focal deficits    I&O's Summary    24 Feb 2019 07:01  -  25 Feb 2019 07:00  --------------------------------------------------------  IN: 1305 mL / OUT: 150 mL / NET: 1155 mL      I&O's Detail    24 Feb 2019 07:01  -  25 Feb 2019 07:00  --------------------------------------------------------  IN:    dextrose 5% + sodium chloride 0.45% with potassium chloride 20 mEq/L: 825 mL    Oral Fluid: 480 mL  Total IN: 1305 mL    OUT:    Voided: 150 mL  Total OUT: 150 mL    Total NET: 1155 mL        MEDICATIONS  (STANDING):  amLODIPine   Tablet 10 milliGRAM(s) Oral daily  ARIPiprazole 20 milliGRAM(s) Oral daily  buDESOnide 160 MICROgram(s)/formoterol 4.5 MICROgram(s) Inhaler 2 Puff(s) Inhalation two times a day  buPROPion  milliGRAM(s) Oral <User Schedule>  cholestyramine Powder (Sugar-Free) 4 Gram(s) Oral daily  clonazePAM Tablet 1 milliGRAM(s) Oral three times a day  dextrose 5% + sodium chloride 0.45% with potassium chloride 20 mEq/L 1000 milliLiter(s) (75 mL/Hr) IV Continuous <Continuous>  fluticasone propionate 50 MICROgram(s)/spray Nasal Spray 1 Spray(s) Both Nostrils daily  heparin  Injectable 5000 Unit(s) SubCutaneous every 8 hours  hydrocortisone hemorrhoidal Suppository 1 Suppository(s) Rectal once  lactobacillus acidophilus 1 Tablet(s) Oral three times a day with meals  pantoprazole    Tablet 40 milliGRAM(s) Oral two times a day  piperacillin/tazobactam IVPB. 3.375 Gram(s) IV Intermittent every 8 hours  sertraline 200 milliGRAM(s) Oral daily  simvastatin 40 milliGRAM(s) Oral at bedtime  tiotropium 18 MICROgram(s) Capsule 1 Capsule(s) Inhalation daily  traZODone 200 milliGRAM(s) Oral daily    MEDICATIONS  (PRN):  acetaminophen   Tablet .. 650 milliGRAM(s) Oral every 6 hours PRN Temp greater or equal to 38C (100.4F)  ALBUTerol    90 MICROgram(s) HFA Inhaler 2 Puff(s) Inhalation every 6 hours PRN Wheezing    LABS:                        9.4    10.00 )-----------( 261      ( 25 Feb 2019 07:14 )             30.4     02-25    145  |  108  |  6<L>  ----------------------------<  107<H>  3.9   |  30  |  0.99    Ca    8.6      25 Feb 2019 07:14    TPro  6.8  /  Alb  2.4<L>  /  TBili  0.5  /  DBili  x   /  AST  31  /  ALT  35  /  AlkPhos  58  02-24        RADIOLOGY & ADDITIONAL STUDIES:    ASSESSMENT    63y Male with sigmoid diverticulitis with microperforation currently being managed conservatively with IV abx clinically improving.     PLAN  - Discussed with Dr. Gardner and surgical team  - Will continue with conservative medical management with IV zosyn for now, ID following  - pain control, supportive care  - OOB  - CLD  - serial abdominal exams  - labs in am    Surgical Team Contact Information  Spectralink: Ext: [phone] or [phone]  Pager: [phone]

## 2019-02-25 NOTE — PROGRESS NOTE ADULT - SUBJECTIVE AND OBJECTIVE BOX
Patient is a 63y old  Male who presents with a chief complaint of abdominal pain , diarrhea (25 Feb 2019 12:10)        HPI:  63 m from home hx htn , hx  sleep apnea  night cpap prn set 4 , mood dis/ PTSD , hx copd not home bound  , hx diverticulosis , hx of bl knee replacement , hx gerd . pt   came to  ed   1 day  c/o of  abdominal pain lt lower  side  with   nonbloody diarrhea  associated with nausea  but  no vomiting  , no fever  . pt said still tolerated his meal yesterday  , pt had no fever or  no other associated symptom .  pt admitted with microperforation with sigmoid diverticulitis   pt had  endo and colonoscopy done 2 y ago found  to have diverticulosis , pt state no hx gi bleed , no hx gastric ulcer  .   in ed Impression:    CT findings as discussed with small localized fluid collection right   lower quadrant likely secondary to sigmoid diverticulitis with localized   microperforation.  Cannot exclude tip appendicitis.  This finding was discussed with Dr. Saldaña  by telephone at the time of   interpretation on 2/19/2019. (19 Feb 2019 12:58)      SUBJECTIVE & OBJECTIVE: Pt seen and examined at bedside, no acute complaints, feeling better      PHYSICAL EXAM:  T(C): 37.1 (02-25-19 @ 08:06), Max: 37.1 (02-24-19 @ 16:21)  HR: 67 (02-25-19 @ 08:06) (61 - 69)  BP: 124/75 (02-25-19 @ 08:06) (121/76 - 131/72)  RR: 18 (02-25-19 @ 08:06) (18 - 18)  SpO2: 94% (02-25-19 @ 08:06) (94% - 98%)  Wt(kg): --   GENERAL: NAD, well-groomed, well-developed  HEAD:  Atraumatic, Normocephalic  NERVOUS SYSTEM:  Alert & Oriented X3,  CHEST/LUNG: decrease air entry at the bases   HEART: Regular rate and rhythm; No murmurs, rubs, or gallops  ABDOMEN: Soft, Nontender, Nondistended; Bowel sounds present  EXTREMITIES:  2+ Peripheral Pulses, No clubbing, cyanosis, or edema        MEDICATIONS  (STANDING):  amLODIPine   Tablet 10 milliGRAM(s) Oral daily  ARIPiprazole 20 milliGRAM(s) Oral daily  buDESOnide 160 MICROgram(s)/formoterol 4.5 MICROgram(s) Inhaler 2 Puff(s) Inhalation two times a day  buPROPion  milliGRAM(s) Oral <User Schedule>  cholestyramine Powder (Sugar-Free) 4 Gram(s) Oral two times a day  clonazePAM Tablet 1 milliGRAM(s) Oral three times a day  dextrose 5% + sodium chloride 0.45% with potassium chloride 20 mEq/L 1000 milliLiter(s) (75 mL/Hr) IV Continuous <Continuous>  fluticasone propionate 50 MICROgram(s)/spray Nasal Spray 1 Spray(s) Both Nostrils daily  heparin  Injectable 5000 Unit(s) SubCutaneous every 8 hours  hydrocortisone hemorrhoidal Suppository 1 Suppository(s) Rectal once  lactobacillus acidophilus 1 Tablet(s) Oral three times a day with meals  pantoprazole    Tablet 40 milliGRAM(s) Oral two times a day  piperacillin/tazobactam IVPB. 3.375 Gram(s) IV Intermittent every 8 hours  sertraline 200 milliGRAM(s) Oral daily  simvastatin 40 milliGRAM(s) Oral at bedtime  tiotropium 18 MICROgram(s) Capsule 1 Capsule(s) Inhalation daily  traZODone 200 milliGRAM(s) Oral daily    MEDICATIONS  (PRN):  acetaminophen   Tablet .. 650 milliGRAM(s) Oral every 6 hours PRN Temp greater or equal to 38C (100.4F)  ALBUTerol    90 MICROgram(s) HFA Inhaler 2 Puff(s) Inhalation every 6 hours PRN Wheezing  bismuth subsalicylate Liquid 30 milliLiter(s) Oral three times a day PRN Diarrhea      LABS:                        9.4    10.00 )-----------( 261      ( 25 Feb 2019 07:14 )             30.4     02-25    145  |  108  |  6<L>  ----------------------------<  107<H>  3.9   |  30  |  0.99    Ca    8.6      25 Feb 2019 07:14    TPro  6.8  /  Alb  2.4<L>  /  TBili  0.5  /  DBili  x   /  AST  31  /  ALT  35  /  AlkPhos  58  02-24          CAPILLARY BLOOD GLUCOSE          CAPILLARY BLOOD GLUCOSE        CAPILLARY BLOOD GLUCOSE                RECENT CULTURES:      RADIOLOGY & ADDITIONAL TESTS:                        DVT/GI ppx  Discussed with pt @ bedside

## 2019-02-25 NOTE — PROGRESS NOTE ADULT - SUBJECTIVE AND OBJECTIVE BOX
Date/Time Patient Seen:  		  Referring MD:   Data Reviewed	       Patient is a 63y old  Male who presents with a chief complaint of abdominal pain , diarrhea (24 Feb 2019 12:39)      Subjective/HPI     PAST MEDICAL & SURGICAL HISTORY:  Sleep apnea: does not use CPAP machine  Bakers cyst: Bilateral  Osteoarthrosis, localized  PTSD (post-traumatic stress disorder): September 11th 2001  Chronic rhinosinusitis  GERD (gastroesophageal reflux disease)  Asthma  COPD (chronic obstructive pulmonary disease)  High cholesterol  Hypertension  Bakers cyst: Bilateral removal Bakers Cyst  S/P arthroscopic surgery of left knee  S/P wrist surgery: ganglion cyst  S/P tonsillectomy and adenoidectomy  S/P knee replacement, right        Medication list         MEDICATIONS  (STANDING):  amLODIPine   Tablet 10 milliGRAM(s) Oral daily  ARIPiprazole 20 milliGRAM(s) Oral daily  buDESOnide 160 MICROgram(s)/formoterol 4.5 MICROgram(s) Inhaler 2 Puff(s) Inhalation two times a day  buPROPion  milliGRAM(s) Oral <User Schedule>  cholestyramine Powder (Sugar-Free) 4 Gram(s) Oral daily  clonazePAM Tablet 1 milliGRAM(s) Oral three times a day  dextrose 5% + sodium chloride 0.45% with potassium chloride 20 mEq/L 1000 milliLiter(s) (75 mL/Hr) IV Continuous <Continuous>  fluticasone propionate 50 MICROgram(s)/spray Nasal Spray 1 Spray(s) Both Nostrils daily  heparin  Injectable 5000 Unit(s) SubCutaneous every 8 hours  hydrocortisone hemorrhoidal Suppository 1 Suppository(s) Rectal once  lactobacillus acidophilus 1 Tablet(s) Oral three times a day with meals  pantoprazole    Tablet 40 milliGRAM(s) Oral two times a day  piperacillin/tazobactam IVPB. 3.375 Gram(s) IV Intermittent every 8 hours  sertraline 200 milliGRAM(s) Oral daily  simvastatin 40 milliGRAM(s) Oral at bedtime  tiotropium 18 MICROgram(s) Capsule 1 Capsule(s) Inhalation daily  traZODone 200 milliGRAM(s) Oral daily    MEDICATIONS  (PRN):  acetaminophen   Tablet .. 650 milliGRAM(s) Oral every 6 hours PRN Temp greater or equal to 38C (100.4F)  ALBUTerol    90 MICROgram(s) HFA Inhaler 2 Puff(s) Inhalation every 6 hours PRN Wheezing         Vitals log        ICU Vital Signs Last 24 Hrs  T(C): 36.7 (25 Feb 2019 00:29), Max: 37.1 (24 Feb 2019 16:21)  T(F): 98.1 (25 Feb 2019 00:29), Max: 98.7 (24 Feb 2019 16:21)  HR: 68 (25 Feb 2019 07:00) (61 - 69)  BP: 131/72 (25 Feb 2019 07:00) (98/64 - 131/72)  BP(mean): --  ABP: --  ABP(mean): --  RR: 18 (25 Feb 2019 00:29) (18 - 20)  SpO2: 97% (25 Feb 2019 00:29) (92% - 98%)           Input and Output:  I&O's Detail    24 Feb 2019 07:01  -  25 Feb 2019 07:00  --------------------------------------------------------  IN:    Oral Fluid: 480 mL  Total IN: 480 mL    OUT:    Voided: 150 mL  Total OUT: 150 mL    Total NET: 330 mL          Lab Data                        8.8    10.24 )-----------( 220      ( 24 Feb 2019 05:51 )             28.2     02-24    145  |  107  |  7   ----------------------------<  119<H>  3.3<L>   |  30  |  0.92    Ca    8.3<L>      24 Feb 2019 05:51    TPro  6.8  /  Alb  2.4<L>  /  TBili  0.5  /  DBili  x   /  AST  31  /  ALT  35  /  AlkPhos  58  02-24            Review of Systems	      Objective     Physical Examination        Pertinent Lab findings & Imaging      Dino:  NO   Adequate UO     I&O's Detail    24 Feb 2019 07:01  -  25 Feb 2019 07:00  --------------------------------------------------------  IN:    Oral Fluid: 480 mL  Total IN: 480 mL    OUT:    Voided: 150 mL  Total OUT: 150 mL    Total NET: 330 mL               Discussed with:     Cultures:	  Culture Results:   Enteropathogenic E. coli (EPEC)  DETECTED by PCR  *******Please Note:*******  GI panel PCR evaluates for:  Campylobacter, Plesiomonas shigelloides, Salmonella,  Vibrio, Yersinia enterocolitica, Enteroaggregative  Escherichia coli (EAEC), Enteropathogenic E.coli (EPEC),  Enterotoxigenic E. coli (ETEC) lt/st, Shiga-like  toxin-producing E. coli (STEC) stx1/stx2,  Shigella/ Enteroinvasive E. coli (EIEC), Cryptosporidium,  Cyclospora cayetanensis, Entamoeba histolytica,  Giardia lamblia, AdenovirusF 40/41, Astrovirus,  Norovirus GI/GII, Rotavirus A, Sapovirus (02-24 @ 14:21)        Radiology

## 2019-02-26 PROCEDURE — 99231 SBSQ HOSP IP/OBS SF/LOW 25: CPT

## 2019-02-26 PROCEDURE — 99232 SBSQ HOSP IP/OBS MODERATE 35: CPT

## 2019-02-26 RX ADMIN — Medication 1 SPRAY(S): at 12:36

## 2019-02-26 RX ADMIN — ARIPIPRAZOLE 20 MILLIGRAM(S): 15 TABLET ORAL at 12:36

## 2019-02-26 RX ADMIN — BUDESONIDE AND FORMOTEROL FUMARATE DIHYDRATE 2 PUFF(S): 160; 4.5 AEROSOL RESPIRATORY (INHALATION) at 19:51

## 2019-02-26 RX ADMIN — Medication 1 TABLET(S): at 19:51

## 2019-02-26 RX ADMIN — PANTOPRAZOLE SODIUM 40 MILLIGRAM(S): 20 TABLET, DELAYED RELEASE ORAL at 19:51

## 2019-02-26 RX ADMIN — PIPERACILLIN AND TAZOBACTAM 25 GRAM(S): 4; .5 INJECTION, POWDER, LYOPHILIZED, FOR SOLUTION INTRAVENOUS at 06:20

## 2019-02-26 RX ADMIN — HEPARIN SODIUM 5000 UNIT(S): 5000 INJECTION INTRAVENOUS; SUBCUTANEOUS at 22:00

## 2019-02-26 RX ADMIN — AMLODIPINE BESYLATE 10 MILLIGRAM(S): 2.5 TABLET ORAL at 06:19

## 2019-02-26 RX ADMIN — PANTOPRAZOLE SODIUM 40 MILLIGRAM(S): 20 TABLET, DELAYED RELEASE ORAL at 06:19

## 2019-02-26 RX ADMIN — BUPROPION HYDROCHLORIDE 200 MILLIGRAM(S): 150 TABLET, EXTENDED RELEASE ORAL at 22:00

## 2019-02-26 RX ADMIN — Medication 1 TABLET(S): at 12:36

## 2019-02-26 RX ADMIN — HEPARIN SODIUM 5000 UNIT(S): 5000 INJECTION INTRAVENOUS; SUBCUTANEOUS at 14:05

## 2019-02-26 RX ADMIN — HEPARIN SODIUM 5000 UNIT(S): 5000 INJECTION INTRAVENOUS; SUBCUTANEOUS at 06:19

## 2019-02-26 RX ADMIN — Medication 200 MILLIGRAM(S): at 19:50

## 2019-02-26 RX ADMIN — SERTRALINE 200 MILLIGRAM(S): 25 TABLET, FILM COATED ORAL at 12:36

## 2019-02-26 RX ADMIN — CHOLESTYRAMINE 4 GRAM(S): 4 POWDER, FOR SUSPENSION ORAL at 09:16

## 2019-02-26 RX ADMIN — SIMVASTATIN 40 MILLIGRAM(S): 20 TABLET, FILM COATED ORAL at 22:00

## 2019-02-26 RX ADMIN — DEXTROSE MONOHYDRATE, SODIUM CHLORIDE, AND POTASSIUM CHLORIDE 75 MILLILITER(S): 50; .745; 4.5 INJECTION, SOLUTION INTRAVENOUS at 03:44

## 2019-02-26 RX ADMIN — Medication 1 TABLET(S): at 08:34

## 2019-02-26 RX ADMIN — TIOTROPIUM BROMIDE 1 CAPSULE(S): 18 CAPSULE ORAL; RESPIRATORY (INHALATION) at 08:34

## 2019-02-26 RX ADMIN — BUDESONIDE AND FORMOTEROL FUMARATE DIHYDRATE 2 PUFF(S): 160; 4.5 AEROSOL RESPIRATORY (INHALATION) at 06:23

## 2019-02-26 RX ADMIN — CHOLESTYRAMINE 4 GRAM(S): 4 POWDER, FOR SUSPENSION ORAL at 22:00

## 2019-02-26 RX ADMIN — Medication 1 MILLIGRAM(S): at 15:09

## 2019-02-26 RX ADMIN — Medication 1 MILLIGRAM(S): at 06:19

## 2019-02-26 NOTE — PROGRESS NOTE ADULT - PROBLEM SELECTOR PLAN 4
-normocytic   -H&H seems to be stabilizing   -has been on IVF   -no overt blood loss  -stool has been brown  -check anemia panel, if need some supplementation
-not in exacerbation  -continue home inhalers   -f/up pulm recs
-not in exacerbation  -continue home inhalers   -f/up pulm recs
-normocytic   -H&H seems to be stabilizing   -has been on IVF   -no overt blood loss  -stool has been brown  -check anemia panel, if need some supplementation

## 2019-02-26 NOTE — PROGRESS NOTE ADULT - SUBJECTIVE AND OBJECTIVE BOX
SURGERY    SUBJECTIVE:   Patient seen at bedside, no overnight events, states some abdominal discomfort with associated diarrhea   Patient admits to flatus, bowel movement.   Patient denies any headaches, chest pain, shortness of breath, nausea, vomiting, fever, chills, weakness, dysuria  Patient A+Ox3 in NAD at time of visit.    OBJECTIVE:   T(C): 37.1 (02-26-19 @ 07:40), Max: 37.6 (02-25-19 @ 23:49)  HR: 60 (02-26-19 @ 07:40) (60 - 72)  BP: 134/82 (02-26-19 @ 07:40) (115/71 - 139/76)  RR: 18 (02-26-19 @ 07:40) (18 - 18)  SpO2: 94% (02-26-19 @ 07:40) (94% - 94%)  Wt(kg): --  CAPILLARY BLOOD GLUCOSE        I&O's Detail    25 Feb 2019 07:01  -  26 Feb 2019 07:00  --------------------------------------------------------  IN:    dextrose 5% + sodium chloride 0.45% with potassium chloride 20 mEq/L: 900 mL    Solution: 100 mL  Total IN: 1000 mL    OUT:  Total OUT: 0 mL    Total NET: 1000 mL      26 Feb 2019 07:01  -  26 Feb 2019 11:15  --------------------------------------------------------  IN:    Oral Fluid: 480 mL  Total IN: 480 mL    OUT:  Total OUT: 0 mL    Total NET: 480 mL          Physical exam:  General: A+O x 3 in NAD  HEENT: PERRLA, EOM intact  Neck: trachea midline  Abdomen: soft non distended, non tympanic, mild tenderness to RLQ with palpation , BS x 4, no guarding noted  Extremities: no edema noted, warm,  no calf tenderness     MEDICATIONS  (STANDING):  amLODIPine   Tablet 10 milliGRAM(s) Oral daily  ARIPiprazole 20 milliGRAM(s) Oral daily  buDESOnide 160 MICROgram(s)/formoterol 4.5 MICROgram(s) Inhaler 2 Puff(s) Inhalation two times a day  buPROPion  milliGRAM(s) Oral <User Schedule>  cholestyramine Powder (Sugar-Free) 4 Gram(s) Oral two times a day  clonazePAM Tablet 1 milliGRAM(s) Oral three times a day  dextrose 5% + sodium chloride 0.45% with potassium chloride 20 mEq/L 1000 milliLiter(s) (50 mL/Hr) IV Continuous <Continuous>  fluticasone propionate 50 MICROgram(s)/spray Nasal Spray 1 Spray(s) Both Nostrils daily  heparin  Injectable 5000 Unit(s) SubCutaneous every 8 hours  hydrocortisone hemorrhoidal Suppository 1 Suppository(s) Rectal once  lactobacillus acidophilus 1 Tablet(s) Oral three times a day with meals  pantoprazole    Tablet 40 milliGRAM(s) Oral two times a day  piperacillin/tazobactam IVPB. 3.375 Gram(s) IV Intermittent every 8 hours  sertraline 200 milliGRAM(s) Oral daily  simvastatin 40 milliGRAM(s) Oral at bedtime  tiotropium 18 MICROgram(s) Capsule 1 Capsule(s) Inhalation daily  traZODone 200 milliGRAM(s) Oral daily    MEDICATIONS  (PRN):  acetaminophen   Tablet .. 650 milliGRAM(s) Oral every 6 hours PRN Temp greater or equal to 38C (100.4F)  ALBUTerol    90 MICROgram(s) HFA Inhaler 2 Puff(s) Inhalation every 6 hours PRN Wheezing  bismuth subsalicylate Liquid 30 milliLiter(s) Oral three times a day PRN Diarrhea      LABS:                        9.4    10.00 )-----------( 261      ( 25 Feb 2019 07:14 )             30.4     02-25    145  |  108  |  6<L>  ----------------------------<  107<H>  3.9   |  30  |  0.99    Ca    8.6      25 Feb 2019 07:14            RADIOLOGY & ADDITIONAL STUDIES:    Assessment  Patient is a 63y old Male who presents with diverticulitis with mild abdominal tenderness to palpation, clinically improving    Plan  - DVT prophylaxis  - IV Abx  - advance diet to fulls  - OOB  - will continue to monitor  - case discussed with Dr. Gardner  - marvin in am    Surgical Team Contact Information  Spectralink: Ext: [phone] or [phone]  Pager: [phone]

## 2019-02-26 NOTE — PROGRESS NOTE ADULT - PROBLEM SELECTOR PROBLEM 5
GERD (gastroesophageal reflux disease)
GERD (gastroesophageal reflux disease)
Sleep apnea

## 2019-02-26 NOTE — PROGRESS NOTE ADULT - SUBJECTIVE AND OBJECTIVE BOX
infectious diseases progress note:    ELTON IVERSON is a 63y y. o. Male patient    Patient reports: no new concerns    ROS:    EYES:  Negative  blurry vision or double vision  GASTROINTESTINAL:  Negative for nausea, vomiting, diarrhea  -otherwise negative except for subjective    Allergies    latex (Rash)  No Known Drug Allergies    Intolerances        ANTIBIOTICS/RELEVANT:  antimicrobials  amoxicillin  875 milliGRAM(s)/clavulanate 1 Tablet(s) Oral two times a day    immunologic:    OTHER:  acetaminophen   Tablet .. 650 milliGRAM(s) Oral every 6 hours PRN  ALBUTerol    90 MICROgram(s) HFA Inhaler 2 Puff(s) Inhalation every 6 hours PRN  amLODIPine   Tablet 10 milliGRAM(s) Oral daily  ARIPiprazole 20 milliGRAM(s) Oral daily  bismuth subsalicylate Liquid 30 milliLiter(s) Oral three times a day PRN  buDESOnide 160 MICROgram(s)/formoterol 4.5 MICROgram(s) Inhaler 2 Puff(s) Inhalation two times a day  buPROPion  milliGRAM(s) Oral <User Schedule>  cholestyramine Powder (Sugar-Free) 4 Gram(s) Oral two times a day  clonazePAM Tablet 1 milliGRAM(s) Oral three times a day  dextrose 5% + sodium chloride 0.45% with potassium chloride 20 mEq/L 1000 milliLiter(s) IV Continuous <Continuous>  fluticasone propionate 50 MICROgram(s)/spray Nasal Spray 1 Spray(s) Both Nostrils daily  heparin  Injectable 5000 Unit(s) SubCutaneous every 8 hours  hydrocortisone hemorrhoidal Suppository 1 Suppository(s) Rectal once  lactobacillus acidophilus 1 Tablet(s) Oral three times a day with meals  pantoprazole    Tablet 40 milliGRAM(s) Oral two times a day  sertraline 200 milliGRAM(s) Oral daily  simvastatin 40 milliGRAM(s) Oral at bedtime  tiotropium 18 MICROgram(s) Capsule 1 Capsule(s) Inhalation daily  traZODone 200 milliGRAM(s) Oral daily      Objective:  Vital Signs Last 24 Hrs  T(C): 37.1 (26 Feb 2019 07:40), Max: 37.6 (25 Feb 2019 23:49)  T(F): 98.8 (26 Feb 2019 07:40), Max: 99.7 (25 Feb 2019 23:49)  HR: 60 (26 Feb 2019 07:40) (60 - 72)  BP: 134/82 (26 Feb 2019 07:40) (115/71 - 139/76)  BP(mean): --  RR: 18 (26 Feb 2019 07:40) (18 - 18)  SpO2: 94% (26 Feb 2019 07:40) (94% - 94%)    T(C): 37.1 (02-26-19 @ 07:40), Max: 37.6 (02-25-19 @ 23:49)  T(C): 37.1 (02-26-19 @ 07:40), Max: 37.6 (02-25-19 @ 23:49)  T(C): 37.1 (02-26-19 @ 07:40), Max: 37.7 (02-22-19 @ 23:37)    PHYSICAL EXAM:  Constitutional: Well-developed, well nourished  Eyes: PERRLA, EOMI  Ear/Nose/Throat: oropharynx normal	  Neck: no JVD, no lymphadenopathy, supple  Respiratory: no accessory muscle use  Cardiovascular: RRR,   Gastrointestinal: soft, NT  Extremities: no clubbing, no cyanosis, edema absent      LABS:                        9.4    10.00 )-----------( 261      ( 25 Feb 2019 07:14 )             30.4       10.00 02-25 @ 07:14  10.24 02-24 @ 05:51  9.61 02-23 @ 06:01  10.25 02-22 @ 07:03  10.72 02-21 @ 07:10  14.48 02-20 @ 06:25      02-25    145  |  108  |  6<L>  ----------------------------<  107<H>  3.9   |  30  |  0.99    Ca    8.6      25 Feb 2019 07:14        Creatinine, Serum: 0.99 mg/dL (02-25-19 @ 07:14)  Creatinine, Serum: 0.92 mg/dL (02-24-19 @ 05:51)  Creatinine, Serum: 0.78 mg/dL (02-23-19 @ 06:01)  Creatinine, Serum: 0.85 mg/dL (02-22-19 @ 07:03)  Creatinine, Serum: 0.99 mg/dL (02-21-19 @ 07:10)  Creatinine, Serum: 1.30 mg/dL (02-20-19 @ 06:25)                MICROBIOLOGY:              RADIOLOGY & ADDITIONAL STUDIES:

## 2019-02-26 NOTE — PROGRESS NOTE ADULT - PROBLEM SELECTOR PLAN 8
- c/w home psych regimen; Abilify, Zoloft, Wellbutrin, Trazadone   - today pt stated that he was told to take his wellbutrin SR 200mg QHS instead of 100mg BID -- changed to his regimen  - seems stable

## 2019-02-26 NOTE — PROGRESS NOTE ADULT - SUBJECTIVE AND OBJECTIVE BOX
Date/Time Patient Seen:  		  Referring MD:   Data Reviewed	       Patient is a 63y old  Male who presents with a chief complaint of abdominal pain , diarrhea (25 Feb 2019 12:17)      Subjective/HPI     PAST MEDICAL & SURGICAL HISTORY:  Sleep apnea: does not use CPAP machine  Bakers cyst: Bilateral  Osteoarthrosis, localized  PTSD (post-traumatic stress disorder): September 11th 2001  Chronic rhinosinusitis  GERD (gastroesophageal reflux disease)  Asthma  COPD (chronic obstructive pulmonary disease)  High cholesterol  Hypertension  Bakers cyst: Bilateral removal Bakers Cyst  S/P arthroscopic surgery of left knee  S/P wrist surgery: ganglion cyst  S/P tonsillectomy and adenoidectomy  S/P knee replacement, right        Medication list         MEDICATIONS  (STANDING):  amLODIPine   Tablet 10 milliGRAM(s) Oral daily  ARIPiprazole 20 milliGRAM(s) Oral daily  buDESOnide 160 MICROgram(s)/formoterol 4.5 MICROgram(s) Inhaler 2 Puff(s) Inhalation two times a day  buPROPion  milliGRAM(s) Oral <User Schedule>  cholestyramine Powder (Sugar-Free) 4 Gram(s) Oral two times a day  clonazePAM Tablet 1 milliGRAM(s) Oral three times a day  dextrose 5% + sodium chloride 0.45% with potassium chloride 20 mEq/L 1000 milliLiter(s) (75 mL/Hr) IV Continuous <Continuous>  fluticasone propionate 50 MICROgram(s)/spray Nasal Spray 1 Spray(s) Both Nostrils daily  heparin  Injectable 5000 Unit(s) SubCutaneous every 8 hours  hydrocortisone hemorrhoidal Suppository 1 Suppository(s) Rectal once  lactobacillus acidophilus 1 Tablet(s) Oral three times a day with meals  pantoprazole    Tablet 40 milliGRAM(s) Oral two times a day  piperacillin/tazobactam IVPB. 3.375 Gram(s) IV Intermittent every 8 hours  sertraline 200 milliGRAM(s) Oral daily  simvastatin 40 milliGRAM(s) Oral at bedtime  tiotropium 18 MICROgram(s) Capsule 1 Capsule(s) Inhalation daily  traZODone 200 milliGRAM(s) Oral daily    MEDICATIONS  (PRN):  acetaminophen   Tablet .. 650 milliGRAM(s) Oral every 6 hours PRN Temp greater or equal to 38C (100.4F)  ALBUTerol    90 MICROgram(s) HFA Inhaler 2 Puff(s) Inhalation every 6 hours PRN Wheezing  bismuth subsalicylate Liquid 30 milliLiter(s) Oral three times a day PRN Diarrhea         Vitals log        ICU Vital Signs Last 24 Hrs  T(C): 37 (26 Feb 2019 04:28), Max: 37.6 (25 Feb 2019 23:49)  T(F): 98.6 (26 Feb 2019 04:28), Max: 99.7 (25 Feb 2019 23:49)  HR: 63 (26 Feb 2019 04:28) (60 - 72)  BP: 115/71 (26 Feb 2019 04:28) (115/71 - 139/76)  BP(mean): --  ABP: --  ABP(mean): --  RR: 18 (26 Feb 2019 04:28) (18 - 18)  SpO2: 94% (26 Feb 2019 04:28) (94% - 94%)           Input and Output:  I&O's Detail    25 Feb 2019 07:01  -  26 Feb 2019 07:00  --------------------------------------------------------  IN:    dextrose 5% + sodium chloride 0.45% with potassium chloride 20 mEq/L: 900 mL    Solution: 100 mL  Total IN: 1000 mL    OUT:  Total OUT: 0 mL    Total NET: 1000 mL          Lab Data                        9.4    10.00 )-----------( 261      ( 25 Feb 2019 07:14 )             30.4     02-25    145  |  108  |  6<L>  ----------------------------<  107<H>  3.9   |  30  |  0.99    Ca    8.6      25 Feb 2019 07:14              Review of Systems	      Objective     Physical Examination        Pertinent Lab findings & Imaging      Dino:  NO   Adequate UO     I&O's Detail    25 Feb 2019 07:01  -  26 Feb 2019 07:00  --------------------------------------------------------  IN:    dextrose 5% + sodium chloride 0.45% with potassium chloride 20 mEq/L: 900 mL    Solution: 100 mL  Total IN: 1000 mL    OUT:  Total OUT: 0 mL    Total NET: 1000 mL               Discussed with:     Cultures:	        Radiology

## 2019-02-26 NOTE — PROGRESS NOTE ADULT - PROBLEM SELECTOR PLAN 6
- c/w home psych regimen; Abilify, Zoloft, Wellbutrin, Trazadone   - seems stable
- c/w home psych regimen; Abilify, Zoloft, Wellbutrin, Trazadone   - seems stable
-not in exacerbation  -continue home inhalers   -f/up pulm recs

## 2019-02-26 NOTE — PROGRESS NOTE ADULT - PROBLEM SELECTOR PROBLEM 8
PTSD (post-traumatic stress disorder)

## 2019-02-26 NOTE — PROGRESS NOTE ADULT - PROBLEM SELECTOR PLAN 5
c/w protonix
c/w protonix
pt had hypoxic episode while sleeping without CPAP on 2/20  -started CPAP even for daytime naps   -f/u pulse ox with vitals.  -pulm consult - Dr. Funes

## 2019-02-26 NOTE — PROGRESS NOTE ADULT - PROBLEM SELECTOR PLAN 7
DVT ppx: HSQ
DVT ppx: SCDs for now, consider lovenox tomorrow if H&H stable (the decrease today was likely from IVF) No overt bleeding.
c/w protonix

## 2019-02-26 NOTE — PROGRESS NOTE ADULT - SUBJECTIVE AND OBJECTIVE BOX
INTERVAL HPI/OVERNIGHT EVENTS:  pt seen and examined  denies n/v/abd pain  still w loose stools  tolerating clears  afebrile overnight no new labs to assess    MEDICATIONS  (STANDING):  amLODIPine   Tablet 10 milliGRAM(s) Oral daily  ARIPiprazole 20 milliGRAM(s) Oral daily  buDESOnide 160 MICROgram(s)/formoterol 4.5 MICROgram(s) Inhaler 2 Puff(s) Inhalation two times a day  buPROPion  milliGRAM(s) Oral <User Schedule>  cholestyramine Powder (Sugar-Free) 4 Gram(s) Oral two times a day  clonazePAM Tablet 1 milliGRAM(s) Oral three times a day  dextrose 5% + sodium chloride 0.45% with potassium chloride 20 mEq/L 1000 milliLiter(s) (50 mL/Hr) IV Continuous <Continuous>  fluticasone propionate 50 MICROgram(s)/spray Nasal Spray 1 Spray(s) Both Nostrils daily  heparin  Injectable 5000 Unit(s) SubCutaneous every 8 hours  hydrocortisone hemorrhoidal Suppository 1 Suppository(s) Rectal once  lactobacillus acidophilus 1 Tablet(s) Oral three times a day with meals  pantoprazole    Tablet 40 milliGRAM(s) Oral two times a day  piperacillin/tazobactam IVPB. 3.375 Gram(s) IV Intermittent every 8 hours  sertraline 200 milliGRAM(s) Oral daily  simvastatin 40 milliGRAM(s) Oral at bedtime  tiotropium 18 MICROgram(s) Capsule 1 Capsule(s) Inhalation daily  traZODone 200 milliGRAM(s) Oral daily    MEDICATIONS  (PRN):  acetaminophen   Tablet .. 650 milliGRAM(s) Oral every 6 hours PRN Temp greater or equal to 38C (100.4F)  ALBUTerol    90 MICROgram(s) HFA Inhaler 2 Puff(s) Inhalation every 6 hours PRN Wheezing  bismuth subsalicylate Liquid 30 milliLiter(s) Oral three times a day PRN Diarrhea      Allergies    latex (Rash)  No Known Drug Allergies    Intolerances        Review of Systems:    General:  No wt loss, fevers, chills, night sweats, fatigue   Eyes:  Good vision, no reported pain  ENT:  No sore throat, pain, runny nose, dysphagia  CV:  No pain, palpitations, hypo/hypertension  Resp:  No dyspnea, cough, tachypnea, wheezing  GI:  No pain, No nausea, No vomiting, +diarrhea, No constipation, No weight loss, No fever, No pruritis, No rectal bleeding, No melena, No dysphagia  :  No pain, bleeding, incontinence, nocturia  Muscle:  No pain, weakness  Neuro:  No weakness, tingling, memory problems  Psych:  No fatigue, insomnia, mood problems, depression  Endocrine:  No polyuria, polydypsia, cold/heat intolerance  Heme:  No petechiae, ecchymosis, easy bruisability  Skin:  No rash, tattoos, scars, edema      Vital Signs Last 24 Hrs  T(C): 37.1 (26 Feb 2019 07:40), Max: 37.6 (25 Feb 2019 23:49)  T(F): 98.8 (26 Feb 2019 07:40), Max: 99.7 (25 Feb 2019 23:49)  HR: 60 (26 Feb 2019 07:40) (60 - 72)  BP: 134/82 (26 Feb 2019 07:40) (115/71 - 139/76)  BP(mean): --  RR: 18 (26 Feb 2019 07:40) (18 - 18)  SpO2: 94% (26 Feb 2019 07:40) (94% - 94%)    PHYSICAL EXAM:    General:  nad  HEENT:  NC/AT  Chest:  dec bs  Cardiovascular:  Regular rhythm, S1, S2  Abdomen:  Soft, nt +dt   Extremities:  no edema  Skin:  No rash  Neuro/Psych:  Awake alert responds appropriately      LABS:                        9.4    10.00 )-----------( 261      ( 25 Feb 2019 07:14 )             30.4     02-25    145  |  108  |  6<L>  ----------------------------<  107<H>  3.9   |  30  |  0.99    Ca    8.6      25 Feb 2019 07:14            RADIOLOGY & ADDITIONAL TESTS:

## 2019-02-26 NOTE — PROGRESS NOTE ADULT - PROBLEM SELECTOR PROBLEM 4
Anemia
COPD (chronic obstructive pulmonary disease)
COPD (chronic obstructive pulmonary disease)
Anemia

## 2019-02-26 NOTE — PROGRESS NOTE ADULT - PROBLEM SELECTOR PROBLEM 6
COPD (chronic obstructive pulmonary disease)
PTSD (post-traumatic stress disorder)
PTSD (post-traumatic stress disorder)
COPD (chronic obstructive pulmonary disease)

## 2019-02-26 NOTE — PROGRESS NOTE ADULT - PROBLEM SELECTOR PROBLEM 7
GERD (gastroesophageal reflux disease)
Prophylactic measure
Prophylactic measure
GERD (gastroesophageal reflux disease)

## 2019-02-26 NOTE — PROGRESS NOTE ADULT - SUBJECTIVE AND OBJECTIVE BOX
Patient is a 63y old  Male who presents with a chief complaint of abdominal pain , diarrhea (26 Feb 2019 11:37)        HPI:  63 m from home hx htn , hx  sleep apnea  night cpap prn set 4 , mood dis/ PTSD , hx copd not home bound  , hx diverticulosis , hx of bl knee replacement , hx gerd . pt   came to  ed   1 day  c/o of  abdominal pain lt lower  side  with   nonbloody diarrhea  associated with nausea  but  no vomiting  , no fever  . pt said still tolerated his meal yesterday  , pt had no fever or  no other associated symptom .  pt admitted with microperforation with sigmoid diverticulitis   pt had  endo and colonoscopy done 2 y ago found  to have diverticulosis , pt state no hx gi bleed , no hx gastric ulcer  .   in ed Impression:    CT findings as discussed with small localized fluid collection right   lower quadrant likely secondary to sigmoid diverticulitis with localized   microperforation.  Cannot exclude tip appendicitis.  This finding was discussed with Dr. Saldaña  by telephone at the time of   interpretation on 2/19/2019. (19 Feb 2019 12:58)      SUBJECTIVE & OBJECTIVE: Pt seen and examined at bedside, no acute complaints    PHYSICAL EXAM:  T(C): 37.1 (02-26-19 @ 07:40), Max: 37.6 (02-25-19 @ 23:49)  HR: 60 (02-26-19 @ 07:40) (60 - 72)  BP: 134/82 (02-26-19 @ 07:40) (115/71 - 139/76)  RR: 18 (02-26-19 @ 07:40) (18 - 18)  SpO2: 94% (02-26-19 @ 07:40) (94% - 94%)  Wt(kg): --   GENERAL: NAD, well-groomed, well-developed  HEAD:  Atraumatic, Normocephalic  EYES: EOMI, PERRLA, conjunctiva and sclera clear  ENMT: Moist mucous membranes  NECK: Supple, No JVD  NERVOUS SYSTEM:  Alert & Oriented X3, Motor Strength 5/5 B/L upper and lower extremities; DTRs 2+ intact and symmetric  CHEST/LUNG: Clear to auscultation bilaterally; No rales, rhonchi, wheezing, or rubs  HEART: Regular rate and rhythm; No murmurs, rubs, or gallops  ABDOMEN: Soft, Nontender, Nondistended; Bowel sounds present  EXTREMITIES:  2+ Peripheral Pulses, No clubbing, cyanosis, or edema        MEDICATIONS  (STANDING):  amLODIPine   Tablet 10 milliGRAM(s) Oral daily  amoxicillin  875 milliGRAM(s)/clavulanate 1 Tablet(s) Oral two times a day  ARIPiprazole 20 milliGRAM(s) Oral daily  buDESOnide 160 MICROgram(s)/formoterol 4.5 MICROgram(s) Inhaler 2 Puff(s) Inhalation two times a day  buPROPion  milliGRAM(s) Oral <User Schedule>  cholestyramine Powder (Sugar-Free) 4 Gram(s) Oral two times a day  clonazePAM Tablet 1 milliGRAM(s) Oral three times a day  dextrose 5% + sodium chloride 0.45% with potassium chloride 20 mEq/L 1000 milliLiter(s) (50 mL/Hr) IV Continuous <Continuous>  fluticasone propionate 50 MICROgram(s)/spray Nasal Spray 1 Spray(s) Both Nostrils daily  heparin  Injectable 5000 Unit(s) SubCutaneous every 8 hours  hydrocortisone hemorrhoidal Suppository 1 Suppository(s) Rectal once  lactobacillus acidophilus 1 Tablet(s) Oral three times a day with meals  pantoprazole    Tablet 40 milliGRAM(s) Oral two times a day  sertraline 200 milliGRAM(s) Oral daily  simvastatin 40 milliGRAM(s) Oral at bedtime  tiotropium 18 MICROgram(s) Capsule 1 Capsule(s) Inhalation daily  traZODone 200 milliGRAM(s) Oral daily    MEDICATIONS  (PRN):  acetaminophen   Tablet .. 650 milliGRAM(s) Oral every 6 hours PRN Temp greater or equal to 38C (100.4F)  ALBUTerol    90 MICROgram(s) HFA Inhaler 2 Puff(s) Inhalation every 6 hours PRN Wheezing  bismuth subsalicylate Liquid 30 milliLiter(s) Oral three times a day PRN Diarrhea      LABS:                        9.4    10.00 )-----------( 261      ( 25 Feb 2019 07:14 )             30.4     02-25    145  |  108  |  6<L>  ----------------------------<  107<H>  3.9   |  30  |  0.99    Ca    8.6      25 Feb 2019 07:14            CAPILLARY BLOOD GLUCOSE          CAPILLARY BLOOD GLUCOSE        CAPILLARY BLOOD GLUCOSE                RECENT CULTURES:      RADIOLOGY & ADDITIONAL TESTS:                        DVT/GI ppx  Discussed with pt @ bedside

## 2019-02-27 VITALS
TEMPERATURE: 98 F | RESPIRATION RATE: 18 BRPM | HEART RATE: 63 BPM | SYSTOLIC BLOOD PRESSURE: 139 MMHG | OXYGEN SATURATION: 95 % | DIASTOLIC BLOOD PRESSURE: 83 MMHG

## 2019-02-27 LAB
ALBUMIN SERPL ELPH-MCNC: 2.4 G/DL — LOW (ref 3.3–5)
ALP SERPL-CCNC: 57 U/L — SIGNIFICANT CHANGE UP (ref 40–120)
ALT FLD-CCNC: 31 U/L — SIGNIFICANT CHANGE UP (ref 12–78)
ANION GAP SERPL CALC-SCNC: 6 MMOL/L — SIGNIFICANT CHANGE UP (ref 5–17)
AST SERPL-CCNC: 24 U/L — SIGNIFICANT CHANGE UP (ref 15–37)
BILIRUB SERPL-MCNC: 0.2 MG/DL — SIGNIFICANT CHANGE UP (ref 0.2–1.2)
BUN SERPL-MCNC: 10 MG/DL — SIGNIFICANT CHANGE UP (ref 7–23)
CALCIUM SERPL-MCNC: 8.5 MG/DL — SIGNIFICANT CHANGE UP (ref 8.5–10.1)
CHLORIDE SERPL-SCNC: 110 MMOL/L — HIGH (ref 96–108)
CO2 SERPL-SCNC: 29 MMOL/L — SIGNIFICANT CHANGE UP (ref 22–31)
CREAT SERPL-MCNC: 0.92 MG/DL — SIGNIFICANT CHANGE UP (ref 0.5–1.3)
GLUCOSE SERPL-MCNC: 91 MG/DL — SIGNIFICANT CHANGE UP (ref 70–99)
HCT VFR BLD CALC: 30 % — LOW (ref 39–50)
HGB BLD-MCNC: 9.3 G/DL — LOW (ref 13–17)
MCHC RBC-ENTMCNC: 26.3 PG — LOW (ref 27–34)
MCHC RBC-ENTMCNC: 31 GM/DL — LOW (ref 32–36)
MCV RBC AUTO: 85 FL — SIGNIFICANT CHANGE UP (ref 80–100)
NRBC # BLD: 0 /100 WBCS — SIGNIFICANT CHANGE UP (ref 0–0)
PLATELET # BLD AUTO: 301 K/UL — SIGNIFICANT CHANGE UP (ref 150–400)
POTASSIUM SERPL-MCNC: 3.9 MMOL/L — SIGNIFICANT CHANGE UP (ref 3.5–5.3)
POTASSIUM SERPL-SCNC: 3.9 MMOL/L — SIGNIFICANT CHANGE UP (ref 3.5–5.3)
PROT SERPL-MCNC: 6.8 G/DL — SIGNIFICANT CHANGE UP (ref 6–8.3)
RBC # BLD: 3.53 M/UL — LOW (ref 4.2–5.8)
RBC # FLD: 17.3 % — HIGH (ref 10.3–14.5)
SODIUM SERPL-SCNC: 145 MMOL/L — SIGNIFICANT CHANGE UP (ref 135–145)
WBC # BLD: 7.75 K/UL — SIGNIFICANT CHANGE UP (ref 3.8–10.5)
WBC # FLD AUTO: 7.75 K/UL — SIGNIFICANT CHANGE UP (ref 3.8–10.5)

## 2019-02-27 PROCEDURE — 94660 CPAP INITIATION&MGMT: CPT

## 2019-02-27 PROCEDURE — 99285 EMERGENCY DEPT VISIT HI MDM: CPT | Mod: 25

## 2019-02-27 PROCEDURE — 86900 BLOOD TYPING SEROLOGIC ABO: CPT

## 2019-02-27 PROCEDURE — 96361 HYDRATE IV INFUSION ADD-ON: CPT

## 2019-02-27 PROCEDURE — 83735 ASSAY OF MAGNESIUM: CPT

## 2019-02-27 PROCEDURE — 93005 ELECTROCARDIOGRAM TRACING: CPT

## 2019-02-27 PROCEDURE — 84100 ASSAY OF PHOSPHORUS: CPT

## 2019-02-27 PROCEDURE — 36415 COLL VENOUS BLD VENIPUNCTURE: CPT

## 2019-02-27 PROCEDURE — 86803 HEPATITIS C AB TEST: CPT

## 2019-02-27 PROCEDURE — 86850 RBC ANTIBODY SCREEN: CPT

## 2019-02-27 PROCEDURE — 85730 THROMBOPLASTIN TIME PARTIAL: CPT

## 2019-02-27 PROCEDURE — 85610 PROTHROMBIN TIME: CPT

## 2019-02-27 PROCEDURE — 83690 ASSAY OF LIPASE: CPT

## 2019-02-27 PROCEDURE — 86901 BLOOD TYPING SEROLOGIC RH(D): CPT

## 2019-02-27 PROCEDURE — 81001 URINALYSIS AUTO W/SCOPE: CPT

## 2019-02-27 PROCEDURE — 96374 THER/PROPH/DIAG INJ IV PUSH: CPT

## 2019-02-27 PROCEDURE — 80048 BASIC METABOLIC PNL TOTAL CA: CPT

## 2019-02-27 PROCEDURE — 80053 COMPREHEN METABOLIC PANEL: CPT

## 2019-02-27 PROCEDURE — 82728 ASSAY OF FERRITIN: CPT

## 2019-02-27 PROCEDURE — 82607 VITAMIN B-12: CPT

## 2019-02-27 PROCEDURE — 99231 SBSQ HOSP IP/OBS SF/LOW 25: CPT

## 2019-02-27 PROCEDURE — 96375 TX/PRO/DX INJ NEW DRUG ADDON: CPT

## 2019-02-27 PROCEDURE — 71045 X-RAY EXAM CHEST 1 VIEW: CPT

## 2019-02-27 PROCEDURE — 83540 ASSAY OF IRON: CPT

## 2019-02-27 PROCEDURE — 87507 IADNA-DNA/RNA PROBE TQ 12-25: CPT

## 2019-02-27 PROCEDURE — 99238 HOSP IP/OBS DSCHRG MGMT 30/<: CPT

## 2019-02-27 PROCEDURE — 82746 ASSAY OF FOLIC ACID SERUM: CPT

## 2019-02-27 PROCEDURE — 94640 AIRWAY INHALATION TREATMENT: CPT

## 2019-02-27 PROCEDURE — 74177 CT ABD & PELVIS W/CONTRAST: CPT

## 2019-02-27 PROCEDURE — 82150 ASSAY OF AMYLASE: CPT

## 2019-02-27 PROCEDURE — 83550 IRON BINDING TEST: CPT

## 2019-02-27 PROCEDURE — 87040 BLOOD CULTURE FOR BACTERIA: CPT

## 2019-02-27 PROCEDURE — 85027 COMPLETE CBC AUTOMATED: CPT

## 2019-02-27 RX ORDER — FLUTICASONE PROPIONATE 50 MCG
1 SPRAY, SUSPENSION NASAL
Qty: 0 | Refills: 0 | DISCHARGE
Start: 2019-02-27

## 2019-02-27 RX ADMIN — Medication 1 TABLET(S): at 05:20

## 2019-02-27 RX ADMIN — TIOTROPIUM BROMIDE 1 CAPSULE(S): 18 CAPSULE ORAL; RESPIRATORY (INHALATION) at 11:05

## 2019-02-27 RX ADMIN — ARIPIPRAZOLE 20 MILLIGRAM(S): 15 TABLET ORAL at 11:02

## 2019-02-27 RX ADMIN — PANTOPRAZOLE SODIUM 40 MILLIGRAM(S): 20 TABLET, DELAYED RELEASE ORAL at 05:20

## 2019-02-27 RX ADMIN — BUDESONIDE AND FORMOTEROL FUMARATE DIHYDRATE 2 PUFF(S): 160; 4.5 AEROSOL RESPIRATORY (INHALATION) at 05:19

## 2019-02-27 RX ADMIN — Medication 1 SPRAY(S): at 11:02

## 2019-02-27 RX ADMIN — AMLODIPINE BESYLATE 10 MILLIGRAM(S): 2.5 TABLET ORAL at 05:20

## 2019-02-27 RX ADMIN — CHOLESTYRAMINE 4 GRAM(S): 4 POWDER, FOR SUSPENSION ORAL at 11:04

## 2019-02-27 RX ADMIN — Medication 1 TABLET(S): at 08:43

## 2019-02-27 RX ADMIN — SERTRALINE 200 MILLIGRAM(S): 25 TABLET, FILM COATED ORAL at 11:02

## 2019-02-27 RX ADMIN — Medication 1 TABLET(S): at 11:06

## 2019-02-27 RX ADMIN — Medication 1 MILLIGRAM(S): at 05:20

## 2019-02-27 RX ADMIN — Medication 200 MILLIGRAM(S): at 11:03

## 2019-02-27 RX ADMIN — HEPARIN SODIUM 5000 UNIT(S): 5000 INJECTION INTRAVENOUS; SUBCUTANEOUS at 05:20

## 2019-02-27 NOTE — PROGRESS NOTE ADULT - SUBJECTIVE AND OBJECTIVE BOX
Date/Time Patient Seen:  		  Referring MD:   Data Reviewed	       Patient is a 63y old  Male who presents with a chief complaint of abdominal pain , diarrhea (26 Feb 2019 14:04)      Subjective/HPI     PAST MEDICAL & SURGICAL HISTORY:  Sleep apnea: does not use CPAP machine  Bakers cyst: Bilateral  Osteoarthrosis, localized  PTSD (post-traumatic stress disorder): September 11th 2001  Chronic rhinosinusitis  GERD (gastroesophageal reflux disease)  Asthma  COPD (chronic obstructive pulmonary disease)  High cholesterol  Hypertension  Bakers cyst: Bilateral removal Bakers Cyst  S/P arthroscopic surgery of left knee  S/P wrist surgery: ganglion cyst  S/P tonsillectomy and adenoidectomy  S/P knee replacement, right        Medication list         MEDICATIONS  (STANDING):  amLODIPine   Tablet 10 milliGRAM(s) Oral daily  amoxicillin  875 milliGRAM(s)/clavulanate 1 Tablet(s) Oral two times a day  ARIPiprazole 20 milliGRAM(s) Oral daily  buDESOnide 160 MICROgram(s)/formoterol 4.5 MICROgram(s) Inhaler 2 Puff(s) Inhalation two times a day  buPROPion  milliGRAM(s) Oral <User Schedule>  cholestyramine Powder (Sugar-Free) 4 Gram(s) Oral two times a day  clonazePAM Tablet 1 milliGRAM(s) Oral three times a day  fluticasone propionate 50 MICROgram(s)/spray Nasal Spray 1 Spray(s) Both Nostrils daily  heparin  Injectable 5000 Unit(s) SubCutaneous every 8 hours  hydrocortisone hemorrhoidal Suppository 1 Suppository(s) Rectal once  lactobacillus acidophilus 1 Tablet(s) Oral three times a day with meals  pantoprazole    Tablet 40 milliGRAM(s) Oral two times a day  sertraline 200 milliGRAM(s) Oral daily  simvastatin 40 milliGRAM(s) Oral at bedtime  tiotropium 18 MICROgram(s) Capsule 1 Capsule(s) Inhalation daily  traZODone 200 milliGRAM(s) Oral daily    MEDICATIONS  (PRN):  acetaminophen   Tablet .. 650 milliGRAM(s) Oral every 6 hours PRN Temp greater or equal to 38C (100.4F)  ALBUTerol    90 MICROgram(s) HFA Inhaler 2 Puff(s) Inhalation every 6 hours PRN Wheezing  bismuth subsalicylate Liquid 30 milliLiter(s) Oral three times a day PRN Diarrhea         Vitals log        ICU Vital Signs Last 24 Hrs  T(C): 36.7 (27 Feb 2019 00:09), Max: 37.1 (26 Feb 2019 07:40)  T(F): 98 (27 Feb 2019 00:09), Max: 98.8 (26 Feb 2019 07:40)  HR: 65 (27 Feb 2019 05:18) (56 - 65)  BP: 148/81 (27 Feb 2019 05:18) (115/69 - 148/81)  BP(mean): --  ABP: --  ABP(mean): --  RR: 17 (27 Feb 2019 00:09) (17 - 18)  SpO2: 85% (27 Feb 2019 00:09) (85% - 95%)           Input and Output:  I&O's Detail    26 Feb 2019 07:01  -  27 Feb 2019 07:00  --------------------------------------------------------  IN:    Oral Fluid: 480 mL  Total IN: 480 mL    OUT:  Total OUT: 0 mL    Total NET: 480 mL          Lab Data                  Review of Systems	      Objective     Physical Examination    heart s1s2  lung dec BS      Pertinent Lab findings & Imaging      Dino:  NO   Adequate UO     I&O's Detail    26 Feb 2019 07:01  -  27 Feb 2019 07:00  --------------------------------------------------------  IN:    Oral Fluid: 480 mL  Total IN: 480 mL    OUT:  Total OUT: 0 mL    Total NET: 480 mL               Discussed with:     Cultures:	        Radiology

## 2019-02-27 NOTE — PROGRESS NOTE ADULT - REASON FOR ADMISSION
abdominal pain , diarrhea

## 2019-02-27 NOTE — PROGRESS NOTE ADULT - SUBJECTIVE AND OBJECTIVE BOX
HOSPITAL DAY: 8     SUBJECTIVE:  Patient seen at beside. Patient with no overnight events. Patient with no complaints at this time, pain currently controlled.  Patient tolerating low fiber diet, admits to flatus and 2 formed bowel movements. Patient denies any headaches, chest pain, shortness of breath, palpitations, nausea, vomiting, diarrhea, fevers, chills.    Vital Signs Last 24 Hrs  T(C): 36.7 (27 Feb 2019 07:40), Max: 36.7 (26 Feb 2019 15:49)  T(F): 98.1 (27 Feb 2019 07:40), Max: 98.1 (26 Feb 2019 15:49)  HR: 63 (27 Feb 2019 07:40) (56 - 65)  BP: 139/83 (27 Feb 2019 07:40) (115/69 - 148/81)  BP(mean): --  RR: 18 (27 Feb 2019 07:40) (17 - 18)  SpO2: 95% (27 Feb 2019 07:40) (85% - 95%)    PHYSICAL EXAM:  GENERAL: No acute distress, well-developed  HEAD:  Atraumatic, Normocephalic  ABDOMEN: Soft, tender to deep palpation in RLQ, non-distended; normal bowel sounds  NEUROLOGY: A&O x 3, no focal deficits    I&O's Summary    26 Feb 2019 07:01  -  27 Feb 2019 07:00  --------------------------------------------------------  IN: 480 mL / OUT: 0 mL / NET: 480 mL      I&O's Detail    26 Feb 2019 07:01  -  27 Feb 2019 07:00  --------------------------------------------------------  IN:    Oral Fluid: 480 mL  Total IN: 480 mL    OUT:  Total OUT: 0 mL    Total NET: 480 mL        MEDICATIONS  (STANDING):  amLODIPine   Tablet 10 milliGRAM(s) Oral daily  amoxicillin  875 milliGRAM(s)/clavulanate 1 Tablet(s) Oral two times a day  ARIPiprazole 20 milliGRAM(s) Oral daily  buDESOnide 160 MICROgram(s)/formoterol 4.5 MICROgram(s) Inhaler 2 Puff(s) Inhalation two times a day  buPROPion  milliGRAM(s) Oral <User Schedule>  cholestyramine Powder (Sugar-Free) 4 Gram(s) Oral two times a day  clonazePAM Tablet 1 milliGRAM(s) Oral three times a day  fluticasone propionate 50 MICROgram(s)/spray Nasal Spray 1 Spray(s) Both Nostrils daily  heparin  Injectable 5000 Unit(s) SubCutaneous every 8 hours  hydrocortisone hemorrhoidal Suppository 1 Suppository(s) Rectal once  lactobacillus acidophilus 1 Tablet(s) Oral three times a day with meals  pantoprazole    Tablet 40 milliGRAM(s) Oral two times a day  sertraline 200 milliGRAM(s) Oral daily  simvastatin 40 milliGRAM(s) Oral at bedtime  tiotropium 18 MICROgram(s) Capsule 1 Capsule(s) Inhalation daily  traZODone 200 milliGRAM(s) Oral daily    MEDICATIONS  (PRN):  acetaminophen   Tablet .. 650 milliGRAM(s) Oral every 6 hours PRN Temp greater or equal to 38C (100.4F)  ALBUTerol    90 MICROgram(s) HFA Inhaler 2 Puff(s) Inhalation every 6 hours PRN Wheezing  bismuth subsalicylate Liquid 30 milliLiter(s) Oral three times a day PRN Diarrhea    LABS:                        9.3    7.75  )-----------( 301      ( 27 Feb 2019 07:32 )             30.0     02-27    145  |  110<H>  |  10  ----------------------------<  91  3.9   |  29  |  0.92    Ca    8.5      27 Feb 2019 07:32    TPro  6.8  /  Alb  2.4<L>  /  TBili  0.2  /  DBili  x   /  AST  24  /  ALT  31  /  AlkPhos  57  02-27      ASSESSMENT    63y Male with sigmoid diverticulitis with micro perforation clinically improving.     PLAN  - Discussed with Dr. Kendra Avina for discharge from a surgical standpoint. Patient tolerating PO abx with Wc now down to 7.75, tolerating low fiber diet for both dinner and breakfast today. Pt to be d/c'd on Augmentin 875 BID for 7 days-per ID. Patient to Have REPEAT CT THIS FRIDAY, and will f/u with Dr. Perez as outpatient.      Surgical Team Contact Information  Spectralink: Ext: [phone] or [phone]  Pager: [phone]

## 2019-02-27 NOTE — PROGRESS NOTE ADULT - PROBLEM SELECTOR PROBLEM 3
GERD (gastroesophageal reflux disease)
Hypertension
Sleep apnea
Sleep apnea
Hypertension

## 2019-02-27 NOTE — PROGRESS NOTE ADULT - PROBLEM SELECTOR PROBLEM 1
COPD (chronic obstructive pulmonary disease)
COPD (chronic obstructive pulmonary disease)
Diverticulitis

## 2019-02-27 NOTE — PROGRESS NOTE ADULT - ASSESSMENT
64 yo male admitted with perforated diverticulitis
62 yo white male w pmhx as above, who was well up until yesterday morning when he began to experience lower abdominal pain, cramp, gradual onset with associated mild diarrhea and nausea, found to have diverticulitis. gi consulted
62yo M from home with PMH of HTN, hx of BOOKER (on CPAP set at "4"), mood disorder/ PTSD, COPD, hx diverticulosis, hx of b/l knee replacement, p/w abdominal pain with nonbloody diarrhea admitted with sepsis due to acute diverticulitis with microperforation.
62yo M from home with PMH of HTN, hx of BOOKER (on CPAP set at "4"), mood disorder/ PTSD, COPD, hx diverticulosis, hx of b/l knee replacement, p/w abdominal pain with nonbloody diarrhea admitted with sepsis due to acute diverticulitis with microperforation.
62yo M with PMH of HTN, hx of BOOKER (on CPAP set at "4"), mood disorder/ PTSD, COPD, hx diverticulosis, hx of b/l knee replacement, p/w abdominal pain with nonbloody diarrhea admitted with sepsis due to acute diverticulitis with microperforation.
64 yo male admitted with perforated diverticulitis
64 yo male admitted with perforated diverticulitis    CT-2/22-Redemonstration of perforated diverticulitis involving the   distal sigmoid colon with adjacent fluid and inflammatory changes.   Overall findings appear slightly worsened when compared with the prior CT   exam from 2/19/2019.
64 yo male admitted with perforated diverticulitis  Still mildly tender on exam today  WBC normalized  Afebrile
64 yo white male w pmhx as above, who was well up until yesterday morning when he began to experience lower abdominal pain, cramp, gradual onset with associated mild diarrhea and nausea, found to have diverticulitis. gi consulted
64yo M with PMH of HTN, hx of BOOKER (on CPAP set at "4"), mood disorder/ PTSD, COPD, hx diverticulosis, hx of b/l knee replacement, p/w abdominal pain with nonbloody diarrhea admitted with sepsis due to acute diverticulitis with microperforation.
Likely perforated diverticulitis  Abdominal exam benign  WBC about the same, not unexpected    Suggestions--  F/U temperature curve  F/u CBC  Serial abdominal exam  Continue antibiotics    Andi Green MD  [phone]
Likely perforated diverticulitis  Abdominal exam benign  WBC improved    Suggestions--  F/U temperature curve  F/u CBC  Serial abdominal exam  Continue antibiotics    Andi Green MD  [phone]
62yo M with PMH of HTN, hx of BOOKER (on CPAP set at "4"), mood disorder/ PTSD, COPD, hx diverticulosis, hx of b/l knee replacement, p/w abdominal pain with nonbloody diarrhea admitted with sepsis due to acute diverticulitis with microperforation.
64 yo white male w pmhx as above, who was well up until yesterday morning when he began to experience lower abdominal pain, cramp, gradual onset with associated mild diarrhea and nausea, found to have diverticulitis. gi consulted

## 2019-02-27 NOTE — PROGRESS NOTE ADULT - PROBLEM SELECTOR PLAN 2
sp ivf  no evidence of active gib  monitor cbc, transfuse prn  cont ppi  consider iron studies  will follow
-repleted hypokalemia and hypophosphatemia
dash diet / on amlodipine  fu bp closely .
dash diet / on amlodipine  monitor VS
sp ivf  no evidence of active gib  monitor cbc, transfuse prn  cont ppi  consider iron studies  will follow
sp ivf, possibly 2/2 above  no evidence of active gib  monitor cbc, transfuse prn  cont ppi
sp ivf, possibly 2/2 above  no evidence of active gib, hgb stable  monitor cbc, transfuse prn  cont ppi
sp ivf, possibly 2/2 above  no evidence of active gib, hgb stable  monitor cbc, transfuse prn  cont ppi
-repleted hypokalemia and hypophosphatemia

## 2019-02-27 NOTE — PROGRESS NOTE ADULT - ATTENDING COMMENTS
Pt was seen this AM. He is very comfortable and has no abd. pain and no tenderness on exam.  Will continue antibiotics and clinically he is doing well. Would consider a repeat ct before making any decision re discharge.

## 2019-02-27 NOTE — PROGRESS NOTE ADULT - SUBJECTIVE AND OBJECTIVE BOX
INTERVAL HPI/OVERNIGHT EVENTS:  pt seen and examined  denies n/v/abd pain  stools more formed  tolerating diet  afebrile overnight labs noted    MEDICATIONS  (STANDING):  amLODIPine   Tablet 10 milliGRAM(s) Oral daily  amoxicillin  875 milliGRAM(s)/clavulanate 1 Tablet(s) Oral two times a day  ARIPiprazole 20 milliGRAM(s) Oral daily  buDESOnide 160 MICROgram(s)/formoterol 4.5 MICROgram(s) Inhaler 2 Puff(s) Inhalation two times a day  buPROPion  milliGRAM(s) Oral <User Schedule>  cholestyramine Powder (Sugar-Free) 4 Gram(s) Oral two times a day  clonazePAM Tablet 1 milliGRAM(s) Oral three times a day  fluticasone propionate 50 MICROgram(s)/spray Nasal Spray 1 Spray(s) Both Nostrils daily  heparin  Injectable 5000 Unit(s) SubCutaneous every 8 hours  hydrocortisone hemorrhoidal Suppository 1 Suppository(s) Rectal once  lactobacillus acidophilus 1 Tablet(s) Oral three times a day with meals  pantoprazole    Tablet 40 milliGRAM(s) Oral two times a day  sertraline 200 milliGRAM(s) Oral daily  simvastatin 40 milliGRAM(s) Oral at bedtime  tiotropium 18 MICROgram(s) Capsule 1 Capsule(s) Inhalation daily  traZODone 200 milliGRAM(s) Oral daily    MEDICATIONS  (PRN):  acetaminophen   Tablet .. 650 milliGRAM(s) Oral every 6 hours PRN Temp greater or equal to 38C (100.4F)  ALBUTerol    90 MICROgram(s) HFA Inhaler 2 Puff(s) Inhalation every 6 hours PRN Wheezing  bismuth subsalicylate Liquid 30 milliLiter(s) Oral three times a day PRN Diarrhea      Allergies    latex (Rash)  No Known Drug Allergies    Intolerances        Review of Systems:    General:  No wt loss, fevers, chills, night sweats, fatigue   Eyes:  Good vision, no reported pain  ENT:  No sore throat, pain, runny nose, dysphagia  CV:  No pain, palpitations, hypo/hypertension  Resp:  No dyspnea, cough, tachypnea, wheezing  GI:  No pain, No nausea, No vomiting, No diarrhea, No constipation, No weight loss, No fever, No pruritis, No rectal bleeding, No melena, No dysphagia  :  No pain, bleeding, incontinence, nocturia  Muscle:  No pain, weakness  Neuro:  No weakness, tingling, memory problems  Psych:  No fatigue, insomnia, mood problems, depression  Endocrine:  No polyuria, polydypsia, cold/heat intolerance  Heme:  No petechiae, ecchymosis, easy bruisability  Skin:  No rash, tattoos, scars, edema      Vital Signs Last 24 Hrs  T(C): 36.7 (27 Feb 2019 07:40), Max: 36.7 (26 Feb 2019 15:49)  T(F): 98.1 (27 Feb 2019 07:40), Max: 98.1 (26 Feb 2019 15:49)  HR: 63 (27 Feb 2019 07:40) (56 - 65)  BP: 139/83 (27 Feb 2019 07:40) (115/69 - 148/81)  BP(mean): --  RR: 18 (27 Feb 2019 07:40) (17 - 18)  SpO2: 95% (27 Feb 2019 07:40) (85% - 95%)    PHYSICAL EXAM:  General:  nad  HEENT:  NC/AT  Chest:  dec bs  Cardiovascular:  Regular rhythm, S1, S2  Abdomen:  Soft, nt +dt   Extremities:  no edema  Skin:  No rash  Neuro/Psych:  Awake alert responds appropriately      LABS:                        9.3    7.75  )-----------( 301      ( 27 Feb 2019 07:32 )             30.0     02-27    145  |  110<H>  |  10  ----------------------------<  91  3.9   |  29  |  0.92    Ca    8.5      27 Feb 2019 07:32    TPro  6.8  /  Alb  2.4<L>  /  TBili  0.2  /  DBili  x   /  AST  24  /  ALT  31  /  AlkPhos  57  02-27          RADIOLOGY & ADDITIONAL TESTS:

## 2019-02-27 NOTE — PROGRESS NOTE ADULT - PROBLEM SELECTOR PLAN 3
gerd prec  ppi daily
dash diet / on amlodipine  monitor VS
gerd prec  ppi daily
pt had hypoxic episode while sleeping without CPAP on 2/20  -started CPAP even for daytime naps   -f/u pulse ox with vitals.  -pulm consult - Dr. Funes
pt had hypoxic episode while sleeping without CPAP this afternoon   -started CPAP even for daytime naps   -f/u pulse ox with vitals.  -pulm consult - Dr. Funes
dash diet / on amlodipine  monitor VS

## 2019-02-27 NOTE — PROGRESS NOTE ADULT - PROVIDER SPECIALTY LIST ADULT
Gastroenterology
Hospitalist
Infectious Disease
Pulmonology
Surgery
Hospitalist
Infectious Disease
Gastroenterology

## 2019-03-05 ENCOUNTER — APPOINTMENT (OUTPATIENT)
Dept: SURGERY | Facility: CLINIC | Age: 64
End: 2019-03-05
Payer: MEDICARE

## 2019-03-05 VITALS
BODY MASS INDEX: 35.79 KG/M2 | SYSTOLIC BLOOD PRESSURE: 110 MMHG | WEIGHT: 250 LBS | HEIGHT: 70 IN | DIASTOLIC BLOOD PRESSURE: 71 MMHG | HEART RATE: 62 BPM

## 2019-03-05 DIAGNOSIS — J45.909 UNSPECIFIED ASTHMA, UNCOMPLICATED: ICD-10-CM

## 2019-03-05 DIAGNOSIS — D64.9 ANEMIA, UNSPECIFIED: ICD-10-CM

## 2019-03-05 PROCEDURE — 99214 OFFICE O/P EST MOD 30 MIN: CPT

## 2019-03-07 PROBLEM — D64.9 ANEMIA: Status: ACTIVE | Noted: 2019-03-05

## 2019-03-07 PROBLEM — J45.909 ASTHMA: Status: ACTIVE | Noted: 2019-03-05

## 2019-03-07 RX ORDER — TRAZODONE HYDROCHLORIDE 300 MG/1
TABLET ORAL
Refills: 0 | Status: ACTIVE | COMMUNITY

## 2019-03-07 RX ORDER — BUDESONIDE AND FORMOTEROL FUMARATE DIHYDRATE 160; 4.5 UG/1; UG/1
AEROSOL RESPIRATORY (INHALATION)
Refills: 0 | Status: ACTIVE | COMMUNITY

## 2019-03-07 RX ORDER — SERTRALINE HYDROCHLORIDE 25 MG/1
TABLET, FILM COATED ORAL
Refills: 0 | Status: ACTIVE | COMMUNITY

## 2019-03-07 RX ORDER — TIOTROPIUM BROMIDE INHALATION SPRAY 1.56 UG/1
SPRAY, METERED RESPIRATORY (INHALATION)
Refills: 0 | Status: ACTIVE | COMMUNITY

## 2019-03-07 RX ORDER — SIMVASTATIN 80 MG/1
TABLET, FILM COATED ORAL
Refills: 0 | Status: ACTIVE | COMMUNITY

## 2019-03-07 RX ORDER — RANITIDINE 75 MG/1
TABLET ORAL
Refills: 0 | Status: ACTIVE | COMMUNITY

## 2019-03-07 RX ORDER — ESOMEPRAZOLE MAGNESIUM 40 MG/1
40 GRANULE, DELAYED RELEASE ORAL
Refills: 0 | Status: ACTIVE | COMMUNITY

## 2019-03-07 RX ORDER — CETIRIZINE HCL 10 MG
TABLET ORAL
Refills: 0 | Status: ACTIVE | COMMUNITY

## 2019-03-07 RX ORDER — AZELASTINE HYDROCHLORIDE 137 UG/1
137 SPRAY, METERED NASAL
Refills: 0 | Status: ACTIVE | COMMUNITY

## 2019-03-07 NOTE — HISTORY OF PRESENT ILLNESS
[de-identified] : Admitted two weeks ago with perforated sigmoid diverticulitis.  Treated conservatively with IV antibiotics and responded well.  Discharged to home late last week.

## 2019-03-07 NOTE — ASSESSMENT
[FreeTextEntry1] : f/u diverticulitis with localized perforation.  Responded well to antibiotics.  Discharged from in-patient visit late last week.  No new complaints.  Feeling better.  \par Continue Low residue diet.\par Will repeat CT in 4 weeks.\par f/u in one month.

## 2019-03-14 ENCOUNTER — FORM ENCOUNTER (OUTPATIENT)
Age: 64
End: 2019-03-14

## 2019-03-15 ENCOUNTER — OUTPATIENT (OUTPATIENT)
Dept: OUTPATIENT SERVICES | Facility: HOSPITAL | Age: 64
LOS: 1 days | End: 2019-03-15
Payer: COMMERCIAL

## 2019-03-15 ENCOUNTER — APPOINTMENT (OUTPATIENT)
Dept: CT IMAGING | Facility: CLINIC | Age: 64
End: 2019-03-15
Payer: MEDICARE

## 2019-03-15 DIAGNOSIS — K57.32 DIVERTICULITIS OF LARGE INTESTINE WITHOUT PERFORATION OR ABSCESS WITHOUT BLEEDING: ICD-10-CM

## 2019-03-15 DIAGNOSIS — Z98.89 OTHER SPECIFIED POSTPROCEDURAL STATES: Chronic | ICD-10-CM

## 2019-03-15 DIAGNOSIS — Z96.651 PRESENCE OF RIGHT ARTIFICIAL KNEE JOINT: Chronic | ICD-10-CM

## 2019-03-15 PROCEDURE — 74177 CT ABD & PELVIS W/CONTRAST: CPT

## 2019-03-15 PROCEDURE — 74177 CT ABD & PELVIS W/CONTRAST: CPT | Mod: 26

## 2019-03-22 ENCOUNTER — TRANSCRIPTION ENCOUNTER (OUTPATIENT)
Age: 64
End: 2019-03-22

## 2019-04-09 ENCOUNTER — APPOINTMENT (OUTPATIENT)
Dept: SURGERY | Facility: CLINIC | Age: 64
End: 2019-04-09
Payer: MEDICARE

## 2019-04-09 VITALS
WEIGHT: 246 LBS | SYSTOLIC BLOOD PRESSURE: 128 MMHG | DIASTOLIC BLOOD PRESSURE: 80 MMHG | HEIGHT: 70 IN | HEART RATE: 63 BPM | BODY MASS INDEX: 35.22 KG/M2

## 2019-04-09 DIAGNOSIS — K57.30 DIVERTICULOSIS OF LARGE INTESTINE W/OUT PERFORATION OR ABSCESS W/OUT BLEEDING: ICD-10-CM

## 2019-04-09 DIAGNOSIS — Z87.19 PERSONAL HISTORY OF OTHER DISEASES OF THE DIGESTIVE SYSTEM: ICD-10-CM

## 2019-04-09 DIAGNOSIS — E66.9 OBESITY, UNSPECIFIED: ICD-10-CM

## 2019-04-09 PROCEDURE — 99214 OFFICE O/P EST MOD 30 MIN: CPT

## 2019-04-09 NOTE — ASSESSMENT
[FreeTextEntry1] : Diverticulitis resolved.  This was his first episode of symptoms.  F/U CT abdomen showed improvement.  No indication for urgent surgical intervention.  Wishes to hold off on surgery for now.  R/B/A discussed.  Should continue weight loss efforts.  Continue diet high in fluid and fiber to avoid constipation.  Signs and symptoms of recurrence reviewed.  All questions answered.\par \par [facility] to [facility]

## 2019-04-09 NOTE — PHYSICAL EXAM
[Normal Heart Sounds] : normal heart sounds [Normal Breath Sounds] : Normal breath sounds [Normal Rate and Rhythm] : normal rate and rhythm [No Rash or Lesion] : No rash or lesion [Alert] : alert [Oriented to Person] : oriented to person [Oriented to Place] : oriented to place [Oriented to Time] : oriented to time [Calm] : calm [de-identified] : SARITA CAMERON EOMI [de-identified] : Obese in no distress [de-identified] : Obese, soft, nontender, nondistended, positive bowel sounds in all four quadrants.  No hernia or masses.  No longer having LLQ pain. [de-identified] : Ambulating without difficulty or assistance

## 2019-04-09 NOTE — HISTORY OF PRESENT ILLNESS
[de-identified] : 63 year old recovering from first episode of diverticulitis.  No new complaints.

## 2019-10-19 NOTE — DIETITIAN INITIAL EVALUATION ADULT. - 30 CAL TO
Tracking was updated.  No lab order necessary. Anticipated INR recheck date via home monitorin/15/19.  INR results will be called/faxed in.  INR: 2.2 on 10/18/19  Last office visit with TERESA Tellez PA-C on 19       [id number]

## 2020-02-05 NOTE — H&P ADULT - OPHTHALMOLOGIC
February 5, 2020     Patient: Yamini Wall   YOB: 2007   Date of Visit: 2/5/2020       To Whom it May Concern:    Yamini Wall was seen in my clinic on 2/5/2020  Please excuse her from running club for the next 2 weeks  If you have any questions or concerns, please don't hesitate to call           Sincerely,          Bradly Wilkes PA-C        CC: No Recipients details…

## 2021-01-05 ENCOUNTER — TRANSCRIPTION ENCOUNTER (OUTPATIENT)
Age: 66
End: 2021-01-05

## 2021-01-08 ENCOUNTER — APPOINTMENT (OUTPATIENT)
Dept: DISASTER EMERGENCY | Facility: HOSPITAL | Age: 66
End: 2021-01-08

## 2021-01-08 ENCOUNTER — OUTPATIENT (OUTPATIENT)
Dept: OUTPATIENT SERVICES | Facility: HOSPITAL | Age: 66
LOS: 1 days | End: 2021-01-08
Payer: MEDICARE

## 2021-01-08 VITALS
OXYGEN SATURATION: 98 % | DIASTOLIC BLOOD PRESSURE: 77 MMHG | SYSTOLIC BLOOD PRESSURE: 150 MMHG | HEART RATE: 69 BPM | RESPIRATION RATE: 18 BRPM | TEMPERATURE: 98 F

## 2021-01-08 VITALS
HEART RATE: 70 BPM | DIASTOLIC BLOOD PRESSURE: 89 MMHG | WEIGHT: 315 LBS | RESPIRATION RATE: 18 BRPM | OXYGEN SATURATION: 99 % | SYSTOLIC BLOOD PRESSURE: 158 MMHG | TEMPERATURE: 99 F | HEIGHT: 70 IN

## 2021-01-08 DIAGNOSIS — Z98.89 OTHER SPECIFIED POSTPROCEDURAL STATES: Chronic | ICD-10-CM

## 2021-01-08 DIAGNOSIS — Z96.651 PRESENCE OF RIGHT ARTIFICIAL KNEE JOINT: Chronic | ICD-10-CM

## 2021-01-08 DIAGNOSIS — U07.1 COVID-19: ICD-10-CM

## 2021-01-08 PROCEDURE — M0243: CPT

## 2021-01-08 RX ORDER — SODIUM CHLORIDE 9 MG/ML
250 INJECTION INTRAMUSCULAR; INTRAVENOUS; SUBCUTANEOUS
Refills: 0 | Status: DISCONTINUED | OUTPATIENT
Start: 2021-01-08 | End: 2021-01-22

## 2021-01-08 NOTE — CHART NOTE - NSCHARTNOTEFT_GEN_A_CORE
CC: Monoclonal Antibody Infusion/COVID 19 Positive  65yMale with history of Lung disease due to [place]    exam/findings:  T(C): 37.2 (01-08-21 @ 08:39), Max: 37.2 (01-08-21 @ 08:39)  HR: 70 (01-08-21 @ 08:39) (70 - 70)  BP: 158/89 (01-08-21 @ 08:39) (158/89 - 158/89)  RR: 18 (01-08-21 @ 08:39) (18 - 18)  SpO2: 99% (01-08-21 @ 08:39) (99% - 99%)      PE:   Appearance: NAD	  HEENT:   Normal oral mucosa,   Lymphatic: No lymphadenopathy  Cardiovascular: Normal S1 S2, No JVD, No murmurs, No edema  Respiratory: Lungs clear to auscultation	  Gastrointestinal:  Soft, Non-tender, + BS	  Skin: warm and dry  Neurologic: Non-focal  Extremities: Normal range of motion,    ASSESSMENT:  Pt is a 66 yo male  Covid +  referred by Dr Balderrama..who presents to infusion center for Monoclonal antibody infusion (Casirivimab and Imdevimab)  Symptoms/ Criteria: headache, fevers, fatigue, SOB  Risk Profile includes: Lung Disease     PLAN:  - infusion procedure explained to patient   -Consent for monoclonal antibody infusion obtained   - Risk & benifits discussed/all questions answered  -infuse  Casirivimab and Imdevimab  -observe patient for one hour post infusion CC: Monoclonal Antibody Infusion/COVID 19 Positive  65yMale with history of Lung disease due to 9/11-[facility]    exam/findings:  T(C): 37.2 (01-08-21 @ 08:39), Max: 37.2 (01-08-21 @ 08:39)  HR: 70 (01-08-21 @ 08:39) (70 - 70)  BP: 158/89 (01-08-21 @ 08:39) (158/89 - 158/89)  RR: 18 (01-08-21 @ 08:39) (18 - 18)  SpO2: 99% (01-08-21 @ 08:39) (99% - 99%)      PE:   Appearance: NAD	  HEENT:   Normal oral mucosa,   Lymphatic: No lymphadenopathy  Cardiovascular: Normal S1 S2, No JVD, No murmurs, No edema  Respiratory: Lungs clear to auscultation	  Gastrointestinal:  Soft, Non-tender, + BS	  Skin: warm and dry  Neurologic: Non-focal  Extremities: Normal range of motion,    ASSESSMENT:  Pt is a 64 yo male  Covid +  referred by Dr Balderrama..who presents to infusion center for Monoclonal antibody infusion (Casirivimab and Imdevimab)  Symptoms/ Criteria: headache, fevers, fatigue, SOB  Risk Profile includes: Lung Disease     PLAN:  - infusion procedure explained to patient   -Consent for monoclonal antibody infusion obtained   - Risk & benifits discussed/all questions answered  -infuse  Casirivimab and Imdevimab  -observe patient for one hour post infusion    I have reviewed the casirivmab and Imdevimab Emergency Use Authorization (EUA) and I have provided the patient or patient's caregiver with the following information:      1. FDA has authorized emergency use casirivmab and Imdevimab, which is not an FDA-approved biological product.  2. The patient or patient's caregiver has the option to accept or refuse administration of casirivmab and Imdevimab.   3. The significant known and potential risks and benefits of casirivmab and Imdevimab and the extent to which such risks and benefits are unknown.  4. Information on available alternative treatments and risks and benefits of those alternatives.

## 2021-01-09 ENCOUNTER — TRANSCRIPTION ENCOUNTER (OUTPATIENT)
Age: 66
End: 2021-01-09

## 2021-01-11 ENCOUNTER — TRANSCRIPTION ENCOUNTER (OUTPATIENT)
Age: 66
End: 2021-01-11

## 2021-01-13 ENCOUNTER — TRANSCRIPTION ENCOUNTER (OUTPATIENT)
Age: 66
End: 2021-01-13

## 2021-12-26 NOTE — PROGRESS NOTE ADULT - PROBLEM/PLAN-3
DISPLAY PLAN FREE TEXT
Patient's first and last name, , procedure, and correct site confirmed prior to the start of procedure.
DISPLAY PLAN FREE TEXT
Patient's first and last name, , procedure, and correct site confirmed prior to the start of procedure.

## 2022-03-04 ENCOUNTER — APPOINTMENT (OUTPATIENT)
Dept: ULTRASOUND IMAGING | Facility: CLINIC | Age: 67
End: 2022-03-04
Payer: MEDICARE

## 2022-03-04 ENCOUNTER — APPOINTMENT (OUTPATIENT)
Dept: RADIOLOGY | Facility: CLINIC | Age: 67
End: 2022-03-04
Payer: MEDICARE

## 2022-03-04 PROCEDURE — 93971 EXTREMITY STUDY: CPT | Mod: LT

## 2022-03-04 PROCEDURE — 73560 X-RAY EXAM OF KNEE 1 OR 2: CPT | Mod: LT

## 2022-03-04 PROCEDURE — 73600 X-RAY EXAM OF ANKLE: CPT | Mod: LT

## 2023-01-10 ENCOUNTER — APPOINTMENT (OUTPATIENT)
Dept: RADIOLOGY | Facility: CLINIC | Age: 68
End: 2023-01-10
Payer: MEDICARE

## 2023-01-10 PROCEDURE — 71046 X-RAY EXAM CHEST 2 VIEWS: CPT

## 2023-02-01 NOTE — ED ADULT NURSE NOTE - CAS TRG GEN SKIN COLOR
Gallbladder or Bile Duct Inflammation or Stone - Care Day 3 (1/30/2023) by Aury Palomares RN       Review Status Review Entered   Completed 2/1/2023 1210       Created By   Aury Palomares RN      Criteria Review      Care Day: 3 Care Date: 1/30/2023 Level of Care: Inpatient Floor    Guideline Day 1    Clinical Status    ( ) * Clinical Indications met    (X) Pain, fever, or vomiting    Activity    (X) Bed rest    Routes    (X) IV fluids    2/1/2023 12:10 PM EST by Bhavya Ferreira      NS 125hr    (X) IV medications    2/1/2023 12:10 PM EST by Bhavya Ferreira      droperidol 0.625mg IV x1, zofran 4mg iv x1    Medications    (X) Parenteral analgesics    2/1/2023 12:10 PM EST by Bhavya Ferreira      dilaudid 0.5 IV x3,    (X) Possible antibiotics    2/1/2023 12:10 PM EST by Bhavya Ferreira      cipro 400 IV q12h    * Milestone   Additional Notes   DATE: 1/30, day 3          RELEVANT BASELINES: (lab values, vitals, o2 amount/delivery, etc.)   On RA       PERTINENT UPDATES:   Still requiring IV dilaudid, cont IVF, pt hypoxic on RA, placed on 3lnc       VITALS:   RR 21-28    98.0 28 74 152/94 82% on RA      ABNL/PERTINENT LABS/RADIOLOGY/DIAGNOSTIC STUDIES:   Surgical path:    Microscopic Examination: This is a chronic cholecystitis with cholesterolosis. PHYSICAL EXAM:   Bowel sounds hypoactive, abd soft, tender       MD CONSULTS/ASSESSMENT AND PLAN:   OP NOTE:    Pre-Op Diagnosis: Cholecystitis, acute /Cholelithiasis       Post-Op Diagnosis: same         Procedure(s):   Laparoscopic Cholecystectomy      Complications: none      ORDERS:   Regular diet   Continous o2 sat       PT/OT/SLP/CM ASSESSMENT OR NOTES:   CM:    Met with pt and several family members this afternoon. She resides with family but is self sufficient with her needs. She works and drives. She needs a PCP and would like to go back to the office where her previous provider was.  She would like  on [email or web address]. No other discharge needs anticipated Normal for race

## 2023-03-28 NOTE — PROGRESS NOTE ADULT - PROBLEM SELECTOR PROBLEM 2
Anemia
Electrolyte abnormality
Hypertension
Hypertension
Electrolyte abnormality
negative...

## 2023-11-20 ENCOUNTER — INPATIENT (INPATIENT)
Facility: HOSPITAL | Age: 68
LOS: 4 days | Discharge: HOME | DRG: 811 | End: 2023-11-25
Attending: INTERNAL MEDICINE | Admitting: INTERNAL MEDICINE
Payer: COMMERCIAL

## 2023-11-20 VITALS
WEIGHT: 265 LBS | TEMPERATURE: 98 F | HEART RATE: 96 BPM | HEIGHT: 70 IN | OXYGEN SATURATION: 91 % | RESPIRATION RATE: 18 BRPM | DIASTOLIC BLOOD PRESSURE: 100 MMHG | SYSTOLIC BLOOD PRESSURE: 148 MMHG

## 2023-11-20 DIAGNOSIS — Z98.89 OTHER SPECIFIED POSTPROCEDURAL STATES: Chronic | ICD-10-CM

## 2023-11-20 DIAGNOSIS — J18.9 PNEUMONIA, UNSPECIFIED ORGANISM: ICD-10-CM

## 2023-11-20 DIAGNOSIS — F43.10 POST-TRAUMATIC STRESS DISORDER, UNSPECIFIED: ICD-10-CM

## 2023-11-20 DIAGNOSIS — R73.03 PREDIABETES: ICD-10-CM

## 2023-11-20 DIAGNOSIS — Z96.651 PRESENCE OF RIGHT ARTIFICIAL KNEE JOINT: Chronic | ICD-10-CM

## 2023-11-20 DIAGNOSIS — I10 ESSENTIAL (PRIMARY) HYPERTENSION: ICD-10-CM

## 2023-11-20 DIAGNOSIS — J44.1 CHRONIC OBSTRUCTIVE PULMONARY DISEASE WITH (ACUTE) EXACERBATION: ICD-10-CM

## 2023-11-20 DIAGNOSIS — F41.9 ANXIETY DISORDER, UNSPECIFIED: ICD-10-CM

## 2023-11-20 DIAGNOSIS — D64.9 ANEMIA, UNSPECIFIED: ICD-10-CM

## 2023-11-20 DIAGNOSIS — R74.01 ELEVATION OF LEVELS OF LIVER TRANSAMINASE LEVELS: ICD-10-CM

## 2023-11-20 DIAGNOSIS — Z29.9 ENCOUNTER FOR PROPHYLACTIC MEASURES, UNSPECIFIED: ICD-10-CM

## 2023-11-20 DIAGNOSIS — E78.5 HYPERLIPIDEMIA, UNSPECIFIED: ICD-10-CM

## 2023-11-20 DIAGNOSIS — K21.9 GASTRO-ESOPHAGEAL REFLUX DISEASE WITHOUT ESOPHAGITIS: ICD-10-CM

## 2023-11-20 LAB
ALBUMIN SERPL ELPH-MCNC: 3.6 G/DL — SIGNIFICANT CHANGE UP (ref 3.3–5)
ALBUMIN SERPL ELPH-MCNC: 3.6 G/DL — SIGNIFICANT CHANGE UP (ref 3.3–5)
ALP SERPL-CCNC: 103 U/L — SIGNIFICANT CHANGE UP (ref 40–120)
ALP SERPL-CCNC: 103 U/L — SIGNIFICANT CHANGE UP (ref 40–120)
ALT FLD-CCNC: 101 U/L — HIGH (ref 12–78)
ALT FLD-CCNC: 101 U/L — HIGH (ref 12–78)
AMMONIA BLD-MCNC: 26 UMOL/L — SIGNIFICANT CHANGE UP (ref 11–32)
AMMONIA BLD-MCNC: 26 UMOL/L — SIGNIFICANT CHANGE UP (ref 11–32)
ANION GAP SERPL CALC-SCNC: 5 MMOL/L — SIGNIFICANT CHANGE UP (ref 5–17)
ANION GAP SERPL CALC-SCNC: 5 MMOL/L — SIGNIFICANT CHANGE UP (ref 5–17)
APTT BLD: 28.5 SEC — SIGNIFICANT CHANGE UP (ref 24.5–35.6)
APTT BLD: 28.5 SEC — SIGNIFICANT CHANGE UP (ref 24.5–35.6)
AST SERPL-CCNC: 69 U/L — HIGH (ref 15–37)
AST SERPL-CCNC: 69 U/L — HIGH (ref 15–37)
BASE EXCESS BLDA CALC-SCNC: 11.1 MMOL/L — HIGH (ref -2–3)
BASE EXCESS BLDA CALC-SCNC: 11.1 MMOL/L — HIGH (ref -2–3)
BASE EXCESS BLDV CALC-SCNC: 4.4 MMOL/L — HIGH (ref -2–3)
BASE EXCESS BLDV CALC-SCNC: 4.4 MMOL/L — HIGH (ref -2–3)
BASOPHILS # BLD AUTO: 0.06 K/UL — SIGNIFICANT CHANGE UP (ref 0–0.2)
BASOPHILS # BLD AUTO: 0.06 K/UL — SIGNIFICANT CHANGE UP (ref 0–0.2)
BASOPHILS NFR BLD AUTO: 0.4 % — SIGNIFICANT CHANGE UP (ref 0–2)
BASOPHILS NFR BLD AUTO: 0.4 % — SIGNIFICANT CHANGE UP (ref 0–2)
BILIRUB SERPL-MCNC: 0.5 MG/DL — SIGNIFICANT CHANGE UP (ref 0.2–1.2)
BILIRUB SERPL-MCNC: 0.5 MG/DL — SIGNIFICANT CHANGE UP (ref 0.2–1.2)
BLOOD GAS COMMENTS ARTERIAL: SIGNIFICANT CHANGE UP
BLOOD GAS COMMENTS ARTERIAL: SIGNIFICANT CHANGE UP
BLOOD GAS COMMENTS, VENOUS: SIGNIFICANT CHANGE UP
BLOOD GAS COMMENTS, VENOUS: SIGNIFICANT CHANGE UP
BUN SERPL-MCNC: 24 MG/DL — HIGH (ref 7–23)
BUN SERPL-MCNC: 24 MG/DL — HIGH (ref 7–23)
CALCIUM SERPL-MCNC: 8.9 MG/DL — SIGNIFICANT CHANGE UP (ref 8.5–10.1)
CALCIUM SERPL-MCNC: 8.9 MG/DL — SIGNIFICANT CHANGE UP (ref 8.5–10.1)
CHLORIDE SERPL-SCNC: 107 MMOL/L — SIGNIFICANT CHANGE UP (ref 96–108)
CHLORIDE SERPL-SCNC: 107 MMOL/L — SIGNIFICANT CHANGE UP (ref 96–108)
CO2 SERPL-SCNC: 31 MMOL/L — SIGNIFICANT CHANGE UP (ref 22–31)
CO2 SERPL-SCNC: 31 MMOL/L — SIGNIFICANT CHANGE UP (ref 22–31)
CREAT SERPL-MCNC: 1 MG/DL — SIGNIFICANT CHANGE UP (ref 0.5–1.3)
CREAT SERPL-MCNC: 1 MG/DL — SIGNIFICANT CHANGE UP (ref 0.5–1.3)
EGFR: 82 ML/MIN/1.73M2 — SIGNIFICANT CHANGE UP
EGFR: 82 ML/MIN/1.73M2 — SIGNIFICANT CHANGE UP
EOSINOPHIL # BLD AUTO: 0.07 K/UL — SIGNIFICANT CHANGE UP (ref 0–0.5)
EOSINOPHIL # BLD AUTO: 0.07 K/UL — SIGNIFICANT CHANGE UP (ref 0–0.5)
EOSINOPHIL NFR BLD AUTO: 0.5 % — SIGNIFICANT CHANGE UP (ref 0–6)
EOSINOPHIL NFR BLD AUTO: 0.5 % — SIGNIFICANT CHANGE UP (ref 0–6)
GAS PNL BLDA: SIGNIFICANT CHANGE UP
GAS PNL BLDA: SIGNIFICANT CHANGE UP
GAS PNL BLDV: SIGNIFICANT CHANGE UP
GAS PNL BLDV: SIGNIFICANT CHANGE UP
GLUCOSE SERPL-MCNC: 130 MG/DL — HIGH (ref 70–99)
GLUCOSE SERPL-MCNC: 130 MG/DL — HIGH (ref 70–99)
HCO3 BLDA-SCNC: 36 MMOL/L — HIGH (ref 21–28)
HCO3 BLDA-SCNC: 36 MMOL/L — HIGH (ref 21–28)
HCO3 BLDV-SCNC: 31 MMOL/L — HIGH (ref 22–29)
HCO3 BLDV-SCNC: 31 MMOL/L — HIGH (ref 22–29)
HCT VFR BLD CALC: 27.4 % — LOW (ref 39–50)
HCT VFR BLD CALC: 27.4 % — LOW (ref 39–50)
HGB BLD-MCNC: 7.8 G/DL — LOW (ref 13–17)
HGB BLD-MCNC: 7.8 G/DL — LOW (ref 13–17)
IMM GRANULOCYTES NFR BLD AUTO: 0.8 % — SIGNIFICANT CHANGE UP (ref 0–0.9)
IMM GRANULOCYTES NFR BLD AUTO: 0.8 % — SIGNIFICANT CHANGE UP (ref 0–0.9)
INR BLD: 1.11 RATIO — SIGNIFICANT CHANGE UP (ref 0.85–1.18)
INR BLD: 1.11 RATIO — SIGNIFICANT CHANGE UP (ref 0.85–1.18)
LACTATE SERPL-SCNC: 1.2 MMOL/L — SIGNIFICANT CHANGE UP (ref 0.7–2)
LACTATE SERPL-SCNC: 1.2 MMOL/L — SIGNIFICANT CHANGE UP (ref 0.7–2)
LYMPHOCYTES # BLD AUTO: 1.21 K/UL — SIGNIFICANT CHANGE UP (ref 1–3.3)
LYMPHOCYTES # BLD AUTO: 1.21 K/UL — SIGNIFICANT CHANGE UP (ref 1–3.3)
LYMPHOCYTES # BLD AUTO: 8.4 % — LOW (ref 13–44)
LYMPHOCYTES # BLD AUTO: 8.4 % — LOW (ref 13–44)
MCHC RBC-ENTMCNC: 21.9 PG — LOW (ref 27–34)
MCHC RBC-ENTMCNC: 21.9 PG — LOW (ref 27–34)
MCHC RBC-ENTMCNC: 28.5 GM/DL — LOW (ref 32–36)
MCHC RBC-ENTMCNC: 28.5 GM/DL — LOW (ref 32–36)
MCV RBC AUTO: 77 FL — LOW (ref 80–100)
MCV RBC AUTO: 77 FL — LOW (ref 80–100)
MONOCYTES # BLD AUTO: 0.92 K/UL — HIGH (ref 0–0.9)
MONOCYTES # BLD AUTO: 0.92 K/UL — HIGH (ref 0–0.9)
MONOCYTES NFR BLD AUTO: 6.4 % — SIGNIFICANT CHANGE UP (ref 2–14)
MONOCYTES NFR BLD AUTO: 6.4 % — SIGNIFICANT CHANGE UP (ref 2–14)
NEUTROPHILS # BLD AUTO: 12.06 K/UL — HIGH (ref 1.8–7.4)
NEUTROPHILS # BLD AUTO: 12.06 K/UL — HIGH (ref 1.8–7.4)
NEUTROPHILS NFR BLD AUTO: 83.5 % — HIGH (ref 43–77)
NEUTROPHILS NFR BLD AUTO: 83.5 % — HIGH (ref 43–77)
NRBC # BLD: 0 /100 WBCS — SIGNIFICANT CHANGE UP (ref 0–0)
NRBC # BLD: 0 /100 WBCS — SIGNIFICANT CHANGE UP (ref 0–0)
PCO2 BLDA: 58 MMHG — HIGH (ref 35–48)
PCO2 BLDA: 58 MMHG — HIGH (ref 35–48)
PCO2 BLDV: 61 MMHG — HIGH (ref 42–55)
PCO2 BLDV: 61 MMHG — HIGH (ref 42–55)
PH BLDA: 7.4 — SIGNIFICANT CHANGE UP (ref 7.35–7.45)
PH BLDA: 7.4 — SIGNIFICANT CHANGE UP (ref 7.35–7.45)
PH BLDV: 7.31 — LOW (ref 7.32–7.43)
PH BLDV: 7.31 — LOW (ref 7.32–7.43)
PLATELET # BLD AUTO: 289 K/UL — SIGNIFICANT CHANGE UP (ref 150–400)
PLATELET # BLD AUTO: 289 K/UL — SIGNIFICANT CHANGE UP (ref 150–400)
PO2 BLDA: 92 MMHG — SIGNIFICANT CHANGE UP (ref 83–108)
PO2 BLDA: 92 MMHG — SIGNIFICANT CHANGE UP (ref 83–108)
PO2 BLDV: 162 MMHG — HIGH (ref 25–45)
PO2 BLDV: 162 MMHG — HIGH (ref 25–45)
POTASSIUM SERPL-MCNC: 4.4 MMOL/L — SIGNIFICANT CHANGE UP (ref 3.5–5.3)
POTASSIUM SERPL-MCNC: 4.4 MMOL/L — SIGNIFICANT CHANGE UP (ref 3.5–5.3)
POTASSIUM SERPL-SCNC: 4.4 MMOL/L — SIGNIFICANT CHANGE UP (ref 3.5–5.3)
POTASSIUM SERPL-SCNC: 4.4 MMOL/L — SIGNIFICANT CHANGE UP (ref 3.5–5.3)
PROCALCITONIN SERPL-MCNC: 0.06 NG/ML — SIGNIFICANT CHANGE UP
PROCALCITONIN SERPL-MCNC: 0.06 NG/ML — SIGNIFICANT CHANGE UP
PROT SERPL-MCNC: 7.9 G/DL — SIGNIFICANT CHANGE UP (ref 6–8.3)
PROT SERPL-MCNC: 7.9 G/DL — SIGNIFICANT CHANGE UP (ref 6–8.3)
PROTHROM AB SERPL-ACNC: 13 SEC — SIGNIFICANT CHANGE UP (ref 9.5–13)
PROTHROM AB SERPL-ACNC: 13 SEC — SIGNIFICANT CHANGE UP (ref 9.5–13)
RAPID RVP RESULT: SIGNIFICANT CHANGE UP
RAPID RVP RESULT: SIGNIFICANT CHANGE UP
RBC # BLD: 3.56 M/UL — LOW (ref 4.2–5.8)
RBC # BLD: 3.56 M/UL — LOW (ref 4.2–5.8)
RBC # FLD: 15.9 % — HIGH (ref 10.3–14.5)
RBC # FLD: 15.9 % — HIGH (ref 10.3–14.5)
SAO2 % BLDA: 99.1 % — HIGH (ref 94–98)
SAO2 % BLDA: 99.1 % — HIGH (ref 94–98)
SAO2 % BLDV: 99.2 % — HIGH (ref 67–88)
SAO2 % BLDV: 99.2 % — HIGH (ref 67–88)
SARS-COV-2 RNA SPEC QL NAA+PROBE: SIGNIFICANT CHANGE UP
SARS-COV-2 RNA SPEC QL NAA+PROBE: SIGNIFICANT CHANGE UP
SODIUM SERPL-SCNC: 143 MMOL/L — SIGNIFICANT CHANGE UP (ref 135–145)
SODIUM SERPL-SCNC: 143 MMOL/L — SIGNIFICANT CHANGE UP (ref 135–145)
TROPONIN I, HIGH SENSITIVITY RESULT: 45.3 NG/L — SIGNIFICANT CHANGE UP
TROPONIN I, HIGH SENSITIVITY RESULT: 45.3 NG/L — SIGNIFICANT CHANGE UP
WBC # BLD: 14.44 K/UL — HIGH (ref 3.8–10.5)
WBC # BLD: 14.44 K/UL — HIGH (ref 3.8–10.5)
WBC # FLD AUTO: 14.44 K/UL — HIGH (ref 3.8–10.5)
WBC # FLD AUTO: 14.44 K/UL — HIGH (ref 3.8–10.5)

## 2023-11-20 PROCEDURE — 74177 CT ABD & PELVIS W/CONTRAST: CPT | Mod: 26,MA

## 2023-11-20 PROCEDURE — 71045 X-RAY EXAM CHEST 1 VIEW: CPT | Mod: 26

## 2023-11-20 PROCEDURE — 70450 CT HEAD/BRAIN W/O DYE: CPT | Mod: 26,MA

## 2023-11-20 PROCEDURE — 71260 CT THORAX DX C+: CPT | Mod: 26,MA

## 2023-11-20 PROCEDURE — 99223 1ST HOSP IP/OBS HIGH 75: CPT | Mod: GC

## 2023-11-20 PROCEDURE — 93010 ELECTROCARDIOGRAM REPORT: CPT

## 2023-11-20 PROCEDURE — 99285 EMERGENCY DEPT VISIT HI MDM: CPT

## 2023-11-20 RX ORDER — LANOLIN ALCOHOL/MO/W.PET/CERES
3 CREAM (GRAM) TOPICAL AT BEDTIME
Refills: 0 | Status: DISCONTINUED | OUTPATIENT
Start: 2023-11-20 | End: 2023-11-25

## 2023-11-20 RX ORDER — ARIPIPRAZOLE 15 MG/1
1 TABLET ORAL
Refills: 0 | DISCHARGE

## 2023-11-20 RX ORDER — ENOXAPARIN SODIUM 100 MG/ML
40 INJECTION SUBCUTANEOUS EVERY 24 HOURS
Refills: 0 | Status: DISCONTINUED | OUTPATIENT
Start: 2023-11-20 | End: 2023-11-20

## 2023-11-20 RX ORDER — BUPROPION HYDROCHLORIDE 150 MG/1
300 TABLET, EXTENDED RELEASE ORAL DAILY
Refills: 0 | Status: DISCONTINUED | OUTPATIENT
Start: 2023-11-20 | End: 2023-11-25

## 2023-11-20 RX ORDER — TRAZODONE HCL 50 MG
150 TABLET ORAL AT BEDTIME
Refills: 0 | Status: DISCONTINUED | OUTPATIENT
Start: 2023-11-20 | End: 2023-11-25

## 2023-11-20 RX ORDER — SERTRALINE 25 MG/1
2 TABLET, FILM COATED ORAL
Refills: 0 | DISCHARGE

## 2023-11-20 RX ORDER — ACETAMINOPHEN 500 MG
650 TABLET ORAL EVERY 6 HOURS
Refills: 0 | Status: DISCONTINUED | OUTPATIENT
Start: 2023-11-20 | End: 2023-11-25

## 2023-11-20 RX ORDER — ONDANSETRON 8 MG/1
4 TABLET, FILM COATED ORAL EVERY 8 HOURS
Refills: 0 | Status: DISCONTINUED | OUTPATIENT
Start: 2023-11-20 | End: 2023-11-25

## 2023-11-20 RX ORDER — ALBUTEROL 90 UG/1
2 AEROSOL, METERED ORAL EVERY 6 HOURS
Refills: 0 | Status: DISCONTINUED | OUTPATIENT
Start: 2023-11-20 | End: 2023-11-25

## 2023-11-20 RX ORDER — ATORVASTATIN CALCIUM 80 MG/1
20 TABLET, FILM COATED ORAL AT BEDTIME
Refills: 0 | Status: DISCONTINUED | OUTPATIENT
Start: 2023-11-20 | End: 2023-11-25

## 2023-11-20 RX ORDER — ALBUTEROL 90 UG/1
2 AEROSOL, METERED ORAL
Refills: 0 | DISCHARGE

## 2023-11-20 RX ORDER — AZITHROMYCIN 500 MG/1
500 TABLET, FILM COATED ORAL ONCE
Refills: 0 | Status: COMPLETED | OUTPATIENT
Start: 2023-11-20 | End: 2023-11-20

## 2023-11-20 RX ORDER — ZOLPIDEM TARTRATE 10 MG/1
1 TABLET ORAL
Refills: 0 | DISCHARGE

## 2023-11-20 RX ORDER — AZITHROMYCIN 500 MG/1
500 TABLET, FILM COATED ORAL EVERY 24 HOURS
Refills: 0 | Status: DISCONTINUED | OUTPATIENT
Start: 2023-11-21 | End: 2023-11-21

## 2023-11-20 RX ORDER — CEFTRIAXONE 500 MG/1
1000 INJECTION, POWDER, FOR SOLUTION INTRAMUSCULAR; INTRAVENOUS EVERY 24 HOURS
Refills: 0 | Status: DISCONTINUED | OUTPATIENT
Start: 2023-11-21 | End: 2023-11-25

## 2023-11-20 RX ORDER — SERTRALINE 25 MG/1
200 TABLET, FILM COATED ORAL DAILY
Refills: 0 | Status: DISCONTINUED | OUTPATIENT
Start: 2023-11-20 | End: 2023-11-25

## 2023-11-20 RX ORDER — FAMOTIDINE 10 MG/ML
1 INJECTION INTRAVENOUS
Refills: 0 | DISCHARGE

## 2023-11-20 RX ORDER — CEFTRIAXONE 500 MG/1
1000 INJECTION, POWDER, FOR SOLUTION INTRAMUSCULAR; INTRAVENOUS ONCE
Refills: 0 | Status: COMPLETED | OUTPATIENT
Start: 2023-11-20 | End: 2023-11-20

## 2023-11-20 RX ORDER — ATORVASTATIN CALCIUM 80 MG/1
1 TABLET, FILM COATED ORAL
Refills: 0 | DISCHARGE

## 2023-11-20 RX ORDER — SIMVASTATIN 20 MG/1
2 TABLET, FILM COATED ORAL
Qty: 0 | Refills: 0 | DISCHARGE

## 2023-11-20 RX ORDER — ESOMEPRAZOLE MAGNESIUM 40 MG/1
1 CAPSULE, DELAYED RELEASE ORAL
Qty: 0 | Refills: 0 | DISCHARGE

## 2023-11-20 RX ORDER — TRAZODONE HCL 50 MG
200 TABLET ORAL
Qty: 0 | Refills: 0 | DISCHARGE

## 2023-11-20 RX ORDER — ESOMEPRAZOLE MAGNESIUM 40 MG/1
1 CAPSULE, DELAYED RELEASE ORAL
Refills: 0 | DISCHARGE

## 2023-11-20 RX ORDER — IPRATROPIUM/ALBUTEROL SULFATE 18-103MCG
3 AEROSOL WITH ADAPTER (GRAM) INHALATION EVERY 6 HOURS
Refills: 0 | Status: DISCONTINUED | OUTPATIENT
Start: 2023-11-20 | End: 2023-11-25

## 2023-11-20 RX ORDER — AMLODIPINE BESYLATE 2.5 MG/1
1 TABLET ORAL
Qty: 0 | Refills: 0 | DISCHARGE

## 2023-11-20 RX ORDER — BUPROPION HYDROCHLORIDE 150 MG/1
1 TABLET, EXTENDED RELEASE ORAL
Refills: 0 | DISCHARGE

## 2023-11-20 RX ORDER — BUDESONIDE AND FORMOTEROL FUMARATE DIHYDRATE 160; 4.5 UG/1; UG/1
2 AEROSOL RESPIRATORY (INHALATION)
Refills: 0 | Status: DISCONTINUED | OUTPATIENT
Start: 2023-11-20 | End: 2023-11-25

## 2023-11-20 RX ORDER — TRAZODONE HCL 50 MG
1 TABLET ORAL
Refills: 0 | DISCHARGE

## 2023-11-20 RX ORDER — ARIPIPRAZOLE 15 MG/1
20 TABLET ORAL DAILY
Refills: 0 | Status: DISCONTINUED | OUTPATIENT
Start: 2023-11-20 | End: 2023-11-25

## 2023-11-20 RX ORDER — PANTOPRAZOLE SODIUM 20 MG/1
40 TABLET, DELAYED RELEASE ORAL EVERY 12 HOURS
Refills: 0 | Status: DISCONTINUED | OUTPATIENT
Start: 2023-11-20 | End: 2023-11-25

## 2023-11-20 RX ORDER — BUDESONIDE AND FORMOTEROL FUMARATE DIHYDRATE 160; 4.5 UG/1; UG/1
2 AEROSOL RESPIRATORY (INHALATION)
Refills: 0 | DISCHARGE

## 2023-11-20 RX ORDER — FAMOTIDINE 10 MG/ML
20 INJECTION INTRAVENOUS
Refills: 0 | Status: DISCONTINUED | OUTPATIENT
Start: 2023-11-20 | End: 2023-11-25

## 2023-11-20 RX ORDER — AMLODIPINE BESYLATE 2.5 MG/1
5 TABLET ORAL DAILY
Refills: 0 | Status: DISCONTINUED | OUTPATIENT
Start: 2023-11-20 | End: 2023-11-21

## 2023-11-20 RX ADMIN — Medication 1.5 MILLIGRAM(S): at 22:03

## 2023-11-20 RX ADMIN — CEFTRIAXONE 100 MILLIGRAM(S): 500 INJECTION, POWDER, FOR SOLUTION INTRAMUSCULAR; INTRAVENOUS at 13:05

## 2023-11-20 RX ADMIN — Medication 150 MILLIGRAM(S): at 23:53

## 2023-11-20 RX ADMIN — ATORVASTATIN CALCIUM 20 MILLIGRAM(S): 80 TABLET, FILM COATED ORAL at 22:05

## 2023-11-20 RX ADMIN — AZITHROMYCIN 255 MILLIGRAM(S): 500 TABLET, FILM COATED ORAL at 13:14

## 2023-11-20 RX ADMIN — BUDESONIDE AND FORMOTEROL FUMARATE DIHYDRATE 2 PUFF(S): 160; 4.5 AEROSOL RESPIRATORY (INHALATION) at 23:53

## 2023-11-20 RX ADMIN — PANTOPRAZOLE SODIUM 40 MILLIGRAM(S): 20 TABLET, DELAYED RELEASE ORAL at 22:05

## 2023-11-20 RX ADMIN — FAMOTIDINE 20 MILLIGRAM(S): 10 INJECTION INTRAVENOUS at 22:05

## 2023-11-20 NOTE — H&P ADULT - NSHPPHYSICALEXAM_GEN_ALL_CORE
T(C): 36.4 (11-20-23 @ 09:45), Max: 36.7 (11-20-23 @ 08:16)  HR: 71 (11-20-23 @ 09:45) (71 - 96)  BP: 151/94 (11-20-23 @ 09:45) (148/100 - 151/94)  RR: 18 (11-20-23 @ 09:45) (18 - 18)  SpO2: 99% (11-20-23 @ 09:45) (91% - 99%)    GENERAL: patient appears well, no acute distress, appropriately interactive  EYES: pale conjunctiva B/L, sclera clear, no exudates  ENMT: oropharynx clear without erythema, no exudates, moist mucous membranes  NECK: supple, soft  LUNGS: Diminished breath sounds to Right base and upper lobe, cta on left lung  HEART: soft S1/S2, regular rate and rhythm, no murmurs noted, no lower extremity edema  GASTROINTESTINAL: abdomen is soft, nontender, nondistended, normoactive bowel sounds  INTEGUMENT: good skin turgor, warm skin, appears well perfused  MUSCULOSKELETAL: no clubbing or cyanosis, no obvious deformity  NEUROLOGIC: awake, alert, oriented x3, good muscle tone in all 4 extremities  HEME/LYMPH: no obvious ecchymosis or petechiae

## 2023-11-20 NOTE — CONSULT NOTE ADULT - ASSESSMENT
69 yo male w/ pmh COPD, HTN, HLD, Hypertriglyceridemia, BRIAN, Prediabetes, Anxiety/Depression, PTSD, GERD, BRANDON presents with weakness    copd  pna  brandon  HTN  HLD  DM  Anxiety  Depression  GERD  PTSD 69 yo male w/ pmh COPD, HTN, HLD, Hypertriglyceridemia, BRIAN, Prediabetes, Anxiety/Depression, PTSD, GERD, BRANDON presents with weakness    copd  pna  brandon  HTN  HLD  DM  Anxiety  Depression  GERD  PTSD    low dose steroids and nebs for copd  VBG noted  ct imaging reviewed - eval for PNA  emp ABX  biomarkers - cx -   cough rx regimen  DM care  serial FS  BRANDON - night time CPAP  PPI for GERD  anxiolysis  emotional support  monitor VS and Sat

## 2023-11-20 NOTE — ED ADULT NURSE REASSESSMENT NOTE - NSFALLHARMRISKINTERV_ED_ALL_ED
Assistance OOB with selected safe patient handling equipment if applicable/Assistance with ambulation/Communicate risk of Fall with Harm to all staff, patient, and family/Monitor gait and stability/Monitor for mental status changes and reorient to person, place, and time, as needed/Provide visual cue: red socks, yellow wristband, yellow gown, etc/Reinforce activity limits and safety measures with patient and family/Toileting schedule using arm’s reach rule for commode and bathroom/Use of alarms - bed, stretcher, chair and/or video monitoring/Bed in lowest position, wheels locked, appropriate side rails in place/Call bell, personal items and telephone in reach/Instruct patient to call for assistance before getting out of bed/chair/stretcher/Non-slip footwear applied when patient is off stretcher/[place] to call system/Physically safe environment - no spills, clutter or unnecessary equipment/Purposeful Proactive Rounding/Room/bathroom lighting operational, light cord in reach

## 2023-11-20 NOTE — ED PROVIDER NOTE - PHYSICAL EXAMINATION
Gen: alert, NAD  HEENT:  NC/AT, PERR  CV:  well perfused  Pulm:  normal RR, breathing comfortably  Abd: s/nt/nd  MSK: moving all extremities  Neuro:  non-focal  Skin:  visualized areas intact  Psych: AOx2

## 2023-11-20 NOTE — ED ADULT NURSE REASSESSMENT NOTE - NS ED NURSE REASSESS COMMENT FT1
pt report taken from MADINA Grace. PT on 3 L nasal canual patient is A&Ox3. vital signs within normal limits for patient. patient denies chest pain, or shortness of breath. l.  safety precautions maintained.  will continue to assess and monitor for safety.

## 2023-11-20 NOTE — ED ADULT NURSE NOTE - OBJECTIVE STATEMENT
Received pt sent in by daughter for increased weakness over the last several days.   Daughter states pt was on a hunting trip and was tired Friday, more on Saturday and slept for the entire trip home sunday which is unlike him.   Pt denies cp/sob/palpitations.   Initially pt more lethargic upon assessment, now more alert, denies etoh/illicit drug use.  Pt afebrile.   Safety maintained. BN

## 2023-11-20 NOTE — ED PROVIDER NOTE - CARE PLAN
1 Principal Discharge DX:	Altered mental status   Principal Discharge DX:	Multifocal pneumonia  Secondary Diagnosis:	Altered mental status

## 2023-11-20 NOTE — H&P ADULT - ASSESSMENT
67 yo male w/ pmh COPD, HTN, HLD, Hypertriglyceridemia, BRIAN, Prediabetes, Anxiety/Depression, PTSD, GERD, BOOKER admitted for multifocal pneumonia and anemia.

## 2023-11-20 NOTE — CARE COORDINATION ASSESSMENT. - NSCAREPROVIDERS_GEN_ALL_CORE_FT
CARE PROVIDERS:  Accepting Physician: Atif Gomez  Access Services: Brian Manning  Admitting: Atif Gomez  Attending: Atif Gomez  ED Attending: Gilda Gomez ED Nurse: Nayana Shelton  Nurse: Daniel Johnson  Primary Team: Ramesh Perez  : Dayanara Monreal// Supp. Assoc.: Alena Fowler

## 2023-11-20 NOTE — H&P ADULT - HISTORY OF PRESENT ILLNESS
67 yo male w/ pmh COPD, HTN, HLD, Hypertriglyceridemia, BRIAN, Prediabetes, Anxiety/Depression, PTSD, GERD, BOOKER presents with weakness x2 weeks. HPI aided by daughter as patient is a poor historian. States he had bronchitis about 1 month ago that was treated outpatient with "two rounds of antibiotics" and steroids. The patient went hunting 2 days ago [facility] and states that he fell asleep multiple times while on the trip. Denies any tick bites within the past month. Pt states that he has had an occasional cough, not increased from his baseline cough with his COPD. Endorses having to receive iron supplementation in the past due to his BRIAN. Pt denies any fever, chills, sob, cp, palpitations, dizziness, arthralgia, vision change, abd pain, n/v/d/c.    ED course  Vitals: T 97.6, HR 71, /94, RR 18, SpO2 99% on RA  Significant labs: WBC 14.44, Neutrohil 83.5%, HgB/Hct 7.8/27.4, AST 69, , ABG: ph 7.40, pCO2 58, pO2 92, HCO3 36, Base Excess, Arterial 11.1, Oxygen Saturation 99.1  Imaging: CT Chest: Findings concerning for multifocal pneumonia, most prominent in the right upper lobe.Slightly enlarged mediastinal and right hilar lymph nodes, nonspecific and possibly reactive.  EKG: pending  In ED patient given: Azithromycin, Ceftriaxone

## 2023-11-20 NOTE — H&P ADULT - PROBLEM SELECTOR PLAN 4
h/o mild COPD not on home O2  -takes symbicort inhaler daily, increased use of his albuterol inhaler over the past few days  -ABG revealed CO2 retention  -Amina q6h h/o mild COPD not on home O2  -takes symbicort inhaler daily, increased use of his albuterol inhaler over the past few days  -ABG revealed CO2 retention  -Dr Funes consulted  -Amina q6h

## 2023-11-20 NOTE — CONSULT NOTE ADULT - SUBJECTIVE AND OBJECTIVE BOX
Date/Time Patient Seen:  		  Referring MD:   Data Reviewed	       Patient is a 68y old  Male who presents with a chief complaint of Pneumonia (20 Nov 2023 16:26)      Subjective/HPI   History of Present Illness:   69 yo male w/ pmh COPD, HTN, HLD, Hypertriglyceridemia, BRIAN, Prediabetes, Anxiety/Depression, PTSD, GERD, BOOKER presents with weakness x2 weeks. HPI aided by daughter as patient is a poor historian. States he had bronchitis about 1 month ago that was treated outpatient with "two rounds of antibiotics" and steroids. The patient went hunting 2 days ago [facility] and states that he fell asleep multiple times while on the trip. Denies any tick bites within the past month. Pt states that he has had an occasional cough, not increased from his baseline cough with his COPD. Endorses having to receive iron supplementation in the past due to his BRIAN. Pt denies any fever, chills, sob, cp, palpitations, dizziness, arthralgia, vision change, abd pain, n/v/d/c.    PAST MEDICAL & SURGICAL HISTORY:  Hypertension    High cholesterol    COPD (chronic obstructive pulmonary disease)    Asthma    GERD (gastroesophageal reflux disease)    Chronic rhinosinusitis    PTSD (post-traumatic stress disorder)  September 11th 2001    Osteoarthrosis, localized    Bakers cyst  Bilateral    Sleep apnea  does not use CPAP machine    Prediabetes    Iron deficiency anemia    S/P knee replacement, right    S/P tonsillectomy and adenoidectomy    S/P wrist surgery  ganglion cyst    S/P arthroscopic surgery of left knee    Bakers cyst  Bilateral removal Bakers Cyst    PAST SURGICAL HISTORY:  S/P arthroscopic surgery of left knee     S/P knee replacement, right     S/P tonsillectomy and adenoidectomy     S/P wrist surgery ganglion cyst.     FAMILY HISTORY:  Father  Still living? Unknown  FH: type 2 diabetes, Age at diagnosis: Age Unknown.     Social History:  · Substance use	No  · Social History (marital status, living situation, occupation, and sexual history)	Tobacco: Former, quit 30 years ago  EtOH:  Denies drinking any alcohol  Recreational drug use: Occasionally smokes marijuana  Lives with: Wife and daughter and son-in-law in mother daughter home  Ambulates: w/o assistive device  ADLs: independent     Tobacco Screening:  · Core Measure Site	Yes  · Has the patient used tobacco in the past 30 days?	No    Risk Assessment:    Present on Admission:  Deep Venous Thrombosis	no  Pulmonary Embolus	no     HIV Screening:  · In accordance with [place] State law, we offer every patient who comes to our ED an HIV test. Would you like to be tested today?	Opt out        Medication list         MEDICATIONS  (STANDING):  albuterol/ipratropium for Nebulization 3 milliLiter(s) Nebulizer every 6 hours  amLODIPine   Tablet 5 milliGRAM(s) Oral daily  ARIPiprazole 20 milliGRAM(s) Oral daily  atorvastatin 20 milliGRAM(s) Oral at bedtime  azithromycin  IVPB 500 milliGRAM(s) IV Intermittent every 24 hours  budesonide 160 MICROgram(s)/formoterol 4.5 MICROgram(s) Inhaler 2 Puff(s) Inhalation two times a day  buPROPion XL (24-Hour) . 300 milliGRAM(s) Oral daily  cefTRIAXone   IVPB 1000 milliGRAM(s) IV Intermittent every 24 hours  famotidine    Tablet 20 milliGRAM(s) Oral two times a day  LORazepam     Tablet 1.5 milliGRAM(s) Oral <User Schedule>  LORazepam     Tablet 1 milliGRAM(s) Oral <User Schedule>  pantoprazole    Tablet 40 milliGRAM(s) Oral every 12 hours  sertraline 200 milliGRAM(s) Oral daily  traZODone 150 milliGRAM(s) Oral at bedtime    MEDICATIONS  (PRN):  acetaminophen     Tablet .. 650 milliGRAM(s) Oral every 6 hours PRN Temp greater or equal to 38C (100.4F), Mild Pain (1 - 3)  albuterol    90 MICROgram(s) HFA Inhaler 2 Puff(s) Inhalation every 6 hours PRN for bronchospasm  aluminum hydroxide/magnesium hydroxide/simethicone Suspension 30 milliLiter(s) Oral every 4 hours PRN Dyspepsia  melatonin 3 milliGRAM(s) Oral at bedtime PRN Insomnia  ondansetron Injectable 4 milliGRAM(s) IV Push every 8 hours PRN Nausea and/or Vomiting         Vitals log        ICU Vital Signs Last 24 Hrs  T(C): 36.4 (20 Nov 2023 09:45), Max: 36.7 (20 Nov 2023 08:16)  T(F): 97.6 (20 Nov 2023 09:45), Max: 98 (20 Nov 2023 08:16)  HR: 71 (20 Nov 2023 09:45) (71 - 96)  BP: 151/94 (20 Nov 2023 09:45) (148/100 - 151/94)  BP(mean): --  ABP: --  ABP(mean): --  RR: 18 (20 Nov 2023 09:45) (18 - 18)  SpO2: 99% (20 Nov 2023 09:45) (91% - 99%)    O2 Parameters below as of 20 Nov 2023 09:45  Patient On (Oxygen Delivery Method): nasal cannula  O2 Flow (L/min): 2               Input and Output:  I&O's Detail      Lab Data                        7.8    14.44 )-----------( 289      ( 20 Nov 2023 09:20 )             27.4     11-20    143  |  107  |  24<H>  ----------------------------<  130<H>  4.4   |  31  |  1.00    Ca    8.9      20 Nov 2023 09:20    TPro  7.9  /  Alb  3.6  /  TBili  0.5  /  DBili  x   /  AST  69<H>  /  ALT  101<H>  /  AlkPhos  103  11-20    ABG - ( 20 Nov 2023 11:00 )  pH, Arterial: 7.40  pH, Blood: x     /  pCO2: 58    /  pO2: 92    / HCO3: 36    / Base Excess: 11.1  /  SaO2: 99.1                    Review of Systems	  weakness      Objective     Physical Examination        Pertinent Lab findings & Imaging      Sun:  NO   Adequate UO     I&O's Detail           Discussed with:     Cultures:	        Radiology      INTERPRETATION:  EXAM:XR CHEST IMMEDIATE.     CLINICAL INDICATION: Sepsis .     TECHNIQUE: Portable AP view.     PRIOR EXAM: 01/10/2023.     FINDINGS:     Lung apices are not within the field-of-view. Visualized lung fields are   grossly clear. Cardiac silhouette is magnified. No significant osseous   abnormality.      IMPRESSION:    Grossly clear lungs. Limited study.

## 2023-11-20 NOTE — GOALS OF CARE CONVERSATION - ADVANCED CARE PLANNING - CONVERSATION DETAILS
Palliative care SW met with patient at bedside in ER as per request of unit SW to clarify patient's wishes. Patient had stated that he has a DNR however did not have any paperwork. Reviewed patient's medical and social history as well as events leading to patient's hospitalization. Writer discussed patient's current diagnosis (PNA; HX of COPD, HTN, HLD, Hypertriglyceridemia, BRIAN, Prediabetes, Anxiety/Depression, PTSD, GERD, BOOKER), medical condition and management. Writer recommended completion of a HCP and patient in agreement. Patient named his spouse Alena Walker as health care agent and his daughter Alena Molina as alternate agent. Copy of HCP placed on chart. Reviewed with patient cardiopulmonary resuscitation and when it takes place. Patient stated that he would not want to live as a "vegetable" however wants CPR attempted. Patient showed insight into medical condition. Writer also recommended patient speak to his health care agents about his wishes. All questions answered. Psychosocial support provided.

## 2023-11-20 NOTE — H&P ADULT - PROBLEM SELECTOR PLAN 5
Pt has had unchanged medication regiment x10-15 years  -Continue aripiprazole, sertraline, trazodone, buproprion  -Lorazepam daily dosing 1.5mg am and qhs, 1mg at lunch Pt has had unchanged medication regiment x10-15 years  -Continue aripiprazole, sertraline, trazodone, buproprion  -Lorazepam daily dosing 1.5mg am and qhs, 1mg at lunch  -f/u QTc

## 2023-11-20 NOTE — CARE COORDINATION ASSESSMENT. - NSPASTMEDSURGHISTORY_GEN_ALL_CORE_FT
PAST MEDICAL & SURGICAL HISTORY:  GERD (gastroesophageal reflux disease)      COPD (chronic obstructive pulmonary disease)      High cholesterol      Hypertension      S/P arthroscopic surgery of left knee      S/P wrist surgery  ganglion cyst      S/P tonsillectomy and adenoidectomy      S/P knee replacement, right      Sleep apnea  does not use CPAP machine      Osteoarthrosis, localized      PTSD (post-traumatic stress disorder)  September 11th 2001

## 2023-11-20 NOTE — ED ADULT NURSE NOTE - NSFALLHARMRISKINTERV_ED_ALL_ED

## 2023-11-20 NOTE — ED PROVIDER NOTE - CLINICAL SUMMARY MEDICAL DECISION MAKING FREE TEXT BOX
pt with generalized malaise and increased lethargy pt with generalized malaise and increased lethargy, CT shows multifocal pna, will need admission for iv abx

## 2023-11-20 NOTE — H&P ADULT - NSHPSOCIALHISTORY_GEN_ALL_CORE
FOLLOW-UP PATIENT VISIT     Verbal permission granted by patient to leave a detailed message with medical information at phone number listed in Epic. yes    Patient is female, are you pregnant or breastfeeding ? No    Chief Complaint   Patient presents with   • Skin Assessment       HISTORY OF PRESENT ILLNESS: The patient is a 77 year old  female seen today in follow up for facial skin lesion.     The patient was last seen on 12/22/16 by Dr. Bazan. Per review of this visit note-\"She does have history of colorectal cancer and notes she had several complications with Pap. This makes her fearful for cancer.\"    Concerning areas:  Lesion  Location:  Forehead  Duration:  About 3-4 months  Symptoms:  itching  Changes over time:  more raised  Treatments:  No    Patient states it catches when she garber her hair.  Patient states she fell Dec 10, 2018 and developed a deep cut, the injury was glued and taped.  She noticed this lesion about that time.      Concerning areas:  Brown patch  Location: left cheek  Duration:  10 years   Symptoms:  sore  Changes over time:  growing in size  Treatments:  No    Concerning areas:  Brown patch  Location:  Upper back-neck area  Duration: about 8-10 years  Symptoms:  itching and irritated when wearing jewelry and collars   Changes over time:  growing in size  Treatments:  No    DERM-RELEVANT HISTORY:  History of skin cancer--Negative  Family history of skin cancer- father and mother , unsure of diagnosis  History of tanning bed use- yes, a few times in the past  Outdoor hobbies-Lots of outdoor things, gardening  Sun protective measures-SPF 15  Occupation-Retired   Verbal consent obtained to examine genitals, buttocks yes.    PAST MEDICAL HISTORY:  []  None  []  Melanoma    []  Asthma []  Skin Cancer  []  Eczema []  Keloids  [x]  Allergies []  Psoriasis  []  Vitiligo      Environmental allergies    REVIEW OF SYSTEMS:  Yes No  []  [x]  Other skin concerns, rash, or other changing  lesions  []  [x]  Fevers, current, or recent illness  []  [x]  Easy bruising or bleeding    I have reviewed the past medical history, family history, social history, medications and allergies listed in the medical record as obtained by my nursing staff and support staff and agree with their documentation      PHYSICAL EXAM:   There were no vitals filed for this visit.  GENERAL:  Pleasant, alert, no acute distress, well-groomed.    Skin: Examination of the scalp/body hair, face, neck, ears, eyelids/conjunctiva, lips/oral mucosa, chest, back, abdomen, right upper extremity, left upper extremity, right lower extremity, left lower extremity, digits/nails and genitals/buttocks was performed and findings were within normal limits unless specified below.     Findings include:   -pink scaly 4 mm papule on the central forehead  -faintly white 3 mm papule in the right alar crease  -brown stuck on plaque on the left angle of the jaw  -pink firm regular papule on the central vertex scalp  -well healed scar on the central forehead  -brown scattered stuck on papules and plaques including brown plaque on the central upper back  -gritty papule on the left chest  -scattered cherry red papules  -pink and brown 7 mm irregular pigmented papule on the right shoulder  -pink and brown 6 mm irregularly pigmented papule on the left dorsal forearm  -3 mm brown macule with slight pigment irregularity on the right lateral lower leg  -4 mm dark brown macule with regular reticular pigment on the left extensor knee  -light pink shiny papule on the left mid back 7 mm  -hyperkeratotic papule dorsal PIP left 2nd toe    ASSESSMENT AND PLAN:   Neoplasms of uncertain behavior of the skin:   Central forehead-Ddx inflamed nevus vs squamous cell carcinoma.   Right alar crease-Ddx angiofibroma vs basal cell carcinoma.   Right shoulder-Ddx seborrheic keratosis vs early melanoma.   Left dorsal forearm-Ddx seborrheic keratosis vs benign lichenoid keratosis vs  early melanoma.   Right lateral lower leg-Ddx dysplastic nevus vs melanoma.   Left mid back-Suspect superficial basal cell carcinoma.   Shave biopsies performed today as detailed below.   Left extensor knee-Suspect lentiginous nevus. Will monitor. Recheck in 3 months. Call clinic with changes.       Inflamed seborrheic keratoses: On the central upper back and left angle of the jaw. Discussed benign nature, provided reassurance. As lesions irritated, treated with cryotherapy as detailed below. Also uninflamed seborrheic keratoses scattered throughout.     Actinic keratoses: As noted in exam. Discussed potential premalignant nature and relationship to chronic sun exposure. Discussed treatment options. Treated 1 with cryotherapy as detailed below. Sun protection/avoidance discussed.    Angioma: Discussed benign, vascular nature, provided reassurance.      Benign appearing nevus: Central vertex scalp. Discussed/described benign nature, provided reassurance. Call clinic with changes.     Callus: Overlying bony protuberance of dorsal PIP left 2nd toe. Discussed diagnosis and relationship to friction/repeated pressure. Discussed OTC treatment options including use of OTC cream with salicylic acid.     Return to clinic based on biopsy results    CC: Kelin Nazario MD     CRYOTHERAPY PROCEDURE NOTE     Irritated seborrheic keratosis Location central upper back and left angle of the jaw.  Total .  2    Actinic Keratosis- left chest  Total 1    Total number of lesions treated:  Total 3    After verbal informed consent with discussion of wound formation, potential incomplete resolution of lesion(s), blistering, pain, and dyspigmentation, the lesions (as described above in the physical examination and assessment and plan) were treated individually with liquid nitrogen 1-2 times depending on size of lesion and for 5-10 seconds with each freeze as necessitated by the lesion.  Wound care instructions given.         SHAVE BIOPSY  PROCEDURE NOTE  Verbal informed consent was obtained after discussing risks including pain, bleeding, infection, recurrence, and scarring. The central forehead (site) was sterilely prepped with alcohol, which was allowed to dry completely, and then locally infiltrated with 1% lidocaine with  epinephrine, 1 ml total. A shave biopsy was obtained using a blue blade and the specimen was sent to pathology. Hemostasis was obtained with aluminum chloride. Petrolatum ointment and a clean dressing were applied. The patient tolerated the procedure well without complications. Verbal and written wound care instructions were given.       SHAVE BIOPSY PROCEDURE NOTE  Verbal informed consent was obtained after discussing risks including pain, bleeding, infection, recurrence, and scarring. The right alar crease (site) was sterilely prepped with alcohol, which was allowed to dry completely, and then locally infiltrated with 1% lidocaine with  epinephrine, 1 ml total. A shave biopsy was obtained using a blue blade and the specimen was sent to pathology. Hemostasis was obtained with aluminum chloride. Petrolatum ointment and a clean dressing were applied. The patient tolerated the procedure well without complications. Verbal and written wound care instructions were given.    SHAVE BIOPSY PROCEDURE NOTE  Verbal informed consent was obtained after discussing risks including pain, bleeding, infection, recurrence, and scarring. The right shoulder (site) was sterilely prepped with alcohol, which was allowed to dry completely, and then locally infiltrated with 1% lidocaine with  epinephrine, 1 ml total. A shave biopsy was obtained using a blue blade and the specimen was sent to pathology. Hemostasis was obtained with aluminum chloride. Petrolatum ointment and a clean dressing were applied. The patient tolerated the procedure well without complications. Verbal and written wound care instructions were given.    SHAVE BIOPSY PROCEDURE  NOTE  Verbal informed consent was obtained after discussing risks including pain, bleeding, infection, recurrence, and scarring. The left dorsal forearm (site) was sterilely prepped with alcohol, which was allowed to dry completely, and then locally infiltrated with 1% lidocaine with  epinephrine, 1 ml total. A shave biopsy was obtained using a blue blade and the specimen was sent to pathology. Hemostasis was obtained with aluminum chloride. Petrolatum ointment and a clean dressing were applied. The patient tolerated the procedure well without complications. Verbal and written wound care instructions were given.    SHAVE BIOPSY PROCEDURE NOTE  Verbal informed consent was obtained after discussing risks including pain, bleeding, infection, recurrence, and scarring. The right lateral lower leg (site) was sterilely prepped with alcohol, which was allowed to dry completely, and then locally infiltrated with 1% lidocaine with  epinephrine, 1 ml total. A shave biopsy was obtained using a blue blade and the specimen was sent to pathology. Hemostasis was obtained with aluminum chloride. Petrolatum ointment and a clean dressing were applied. The patient tolerated the procedure well without complications. Verbal and written wound care instructions were given.    SHAVE BIOPSY PROCEDURE NOTE  Verbal informed consent was obtained after discussing risks including pain, bleeding, infection, recurrence, and scarring. The left mid back (site) was sterilely prepped with alcohol, which was allowed to dry completely, and then locally infiltrated with 1% lidocaine with  epinephrine, 1 ml total. A shave biopsy was obtained using a blue blade and the specimen was sent to pathology. Hemostasis was obtained with aluminum chloride. Petrolatum ointment and a clean dressing were applied. The patient tolerated the procedure well without complications. Verbal and written wound care instructions were given.      On 6/19/2019, Jenny KIRKPATRICK  Gilma scribed the services personally performed by Danielle Patterson MD    I, Danielle Patterson MD, attest that the documentation recorded by the scribe accurately and completely reflects the service(s) I personally performed and the decisions made by me. I also reviewed and verified the scribe's note, which I edited as appropriate.     I, Danielle Patterson MD, attest that the documentation recorded by the scribe accurately and completely reflects the service(s) I personally performed and the decisions made by me. I also reviewed and verified the scribe's note, which I edited as appropriate.      Tobacco: Former, quit 30 years ago  EtOH:  Denies drinking any alcohol  Recreational drug use: Occasionally smokes marijuana  Lives with: Wife and daughter and son-in-law in mother daughter home  Ambulates: w/o assistive device  ADLs: independent

## 2023-11-20 NOTE — H&P ADULT - NSHPREVIEWOFSYSTEMS_GEN_ALL_CORE
CONSTITUTIONAL: +weakness, +fatigue  HEENT:  No headache, no visual changes, no sore throat  RESPIRATORY: +cough, no wheezing, hemoptysis; No shortness of breath  CARDIOVASCULAR: No chest pain or palpitations  GASTROINTESTINAL: No abdominal pain, no nausea, vomiting, or hematemesis; No diarrhea or constipation. No melena or hematochezia.  GENITOURINARY: No dysuria, frequency or hematuria  NEUROLOGICAL: No focal weakness or dizziness   MSK: No myalgias  SKIN: No itching, burning, rashes, or lesions CONSTITUTIONAL: + gen. weakness, +fatigue  HEENT:  No headache, no visual changes, no sore throat  RESPIRATORY: +cough, no wheezing, hemoptysis; No shortness of breath  CARDIOVASCULAR: No chest pain or palpitations  GASTROINTESTINAL: No abdominal pain, no nausea, vomiting, or hematemesis; No diarrhea or constipation. No melena or hematochezia.  GENITOURINARY: No dysuria, frequency or hematuria  NEUROLOGICAL: No focal weakness or dizziness   MSK: No myalgias  SKIN: No itching, burning, rashes, or lesions

## 2023-11-20 NOTE — ED ADULT NURSE NOTE - NSICDXFAMILYHX_GEN_ALL_CORE_FT
FAMILY HISTORY:  Father  Still living? Unknown  FH: colon cancer, Age at diagnosis: Age Unknown  FH: type 2 diabetes, Age at diagnosis: Age Unknown

## 2023-11-20 NOTE — H&P ADULT - NSICDXFAMILYHX_GEN_ALL_CORE_FT
FAMILY HISTORY:  Father  Still living? Unknown  FH: type 2 diabetes, Age at diagnosis: Age Unknown     FAMILY HISTORY:  Father  Still living? Unknown  FH: colon cancer, Age at diagnosis: Age Unknown  FH: type 2 diabetes, Age at diagnosis: Age Unknown

## 2023-11-20 NOTE — ED PROVIDER NOTE - NSICDXPASTMEDICALHX_GEN_ALL_CORE_FT
PAST MEDICAL HISTORY:  COPD (chronic obstructive pulmonary disease)     GERD (gastroesophageal reflux disease)     High cholesterol     Hypertension     Osteoarthrosis, localized     PTSD (post-traumatic stress disorder) September 11th 2001    Sleep apnea does not use CPAP machine

## 2023-11-20 NOTE — CONSULT NOTE ADULT - ASSESSMENT
69 yo male w/ pmh COPD, HTN, HLD, Hypertriglyceridemia, BRIAN, Prediabetes, Anxiety/Depression, PTSD, GERD, BOOKER presents with weakness x2 weeks.  Daughter found him home, blue lips WBC 14.44,   CT Chest: Findings concerning for multifocal pneumonia, most prominent in the right upper lobe. Slightly enlarged mediastinal and right hilar lymph nodes, nonspecific and possibly reactive.    RECOMMENDATIONS  1-PNA compelling story and agree with ATS guideline based Rx  Ceftriaxone started 11/20  Azithromycin started 11/20  RVP-neg  -will send urine Legionella  -will order nares MRSA  -pt félixley to produce sputum    Thank you for consulting us and involving us in the management of this most interesting and challenging case.  We will follow along in the care of this patient. Please call us at [phone] or text me directly on my cell# at [phone] with any concerns.

## 2023-11-20 NOTE — ED PROVIDER NOTE - NSICDXPASTSURGICALHX_GEN_ALL_CORE_FT
PAST SURGICAL HISTORY:  S/P arthroscopic surgery of left knee     S/P knee replacement, right     S/P tonsillectomy and adenoidectomy     S/P wrist surgery ganglion cyst

## 2023-11-20 NOTE — CARE COORDINATION ASSESSMENT. - OTHER PERTINENT REFERRAL INFORMATION
SW met with pt and dtr at bedside. Pt lives home with his wife. Alert and oriented X4. Pt independent with adls and ambulation.

## 2023-11-20 NOTE — H&P ADULT - ATTENDING COMMENTS
69 yo male w/ pmh COPD, HTN, HLD, Hypertriglyceridemia, BRIAN, Prediabetes, Anxiety/Depression, PTSD, GERD, BOOKER admitted for multifocal pneumonia and anemia.    HPI as above.     T(C): 36.4 (11-20-23 @ 09:45), Max: 36.7 (11-20-23 @ 08:16)  HR: 71 (11-20-23 @ 09:45) (71 - 96)  BP: 151/94 (11-20-23 @ 09:45) (148/100 - 151/94)  RR: 18 (11-20-23 @ 09:45) (18 - 18)  SpO2: 99% (11-20-23 @ 09:45) (91% - 99%)  Wt(kg): --    Physical Exam:   GENERAL: well-groomed, well-developed, NAD  HEENT: head NC/AT;  conjunctiva & sclera clear; hearing grossly intact, moist mucous membranes  NECK: supple, no JVD  RESPIRATORY: rhonchi b/l lung bases, no rales  CARDIOVASCULAR: S1&S2, RRR  ABDOMEN: soft, non-tender, non-distended, + Bowel sounds x4 quadrants, no guarding, rebound or rigidity  MUSCULOSKELETAL:  no clubbing, cyanosis or edema of all 4 extremities  LYMPH: no cervical lymphadenopathy  VASCULAR: Radial pulses 2+ bilaterally, no varicose veins   SKIN: warm and dry, color normal    Plan:  Pneumonia: continue IV abx  -f/u culture reports  -monitor on pulse ox    Microcytic anemia: f/u FOBT, iron studies, pending results may need GI consult    Transaminitis : trend LFT's    Hx of Iron deficiency anemia: see above    HTN: bp stable and at goal    HLD: continue statin    Anxiety/Depression: continue home meds, f/u QTc    BOOKER: pulm consulted    remainder as above

## 2023-11-20 NOTE — CONSULT NOTE ADULT - SUBJECTIVE AND OBJECTIVE BOX
[facility] DIVISION of INFECTIOUS DISEASE  Camden Mart MD PhD, Janeen Mckeon MD, Cammie Lowry MD, Jody Nur MD, Andres Smith MD  and providing coverage with Morteza Otero MD  Providing Infectious Disease Consultations at [facility], [facility]    Office# [phone] to schedule follow up appointments  Answering Service for urgent calls or New Consults [phone]  Cell# to text for urgent issues Camden Mart [phone]     HPI:  69 yo male w/ pmh COPD, HTN, HLD, Hypertriglyceridemia, BRIAN, Prediabetes, Anxiety/Depression, PTSD, GERD, BOOKER presents with weakness x2 weeks.  States he had bronchitis about 1 month ago that was treated outpatient with "two rounds of antibiotics" and steroids. The patient went hunting 2 days ago [facility] and states that he fell asleep multiple times while on the trip. Denies any tick bites within the past month. Pt states that he has had an occasional cough, not increased from his baseline cough with his COPD. Endorses having to receive iron supplementation in the past due to his BRIAN. Pt denies any fever, chills, sob, cp, palpitations, dizziness, arthralgia, vision change, abd pain, n/v/d/c.    ED course  Vitals: T 97.6, HR 71, /94, RR 18, SpO2 99% on RA  Significant labs: WBC 14.44, Neutrohil 83.5%, HgB/Hct 7.8/27.4, AST 69, , ABG: ph 7.40, pCO2 58, pO2 92, HCO3 36, Base Excess, Arterial 11.1, Oxygen Saturation 99.1  Imaging: CT Chest: Findings concerning for multifocal pneumonia, most prominent in the right upper lobe.Slightly enlarged mediastinal and right hilar lymph nodes, nonspecific and possibly reactive.  EKG: pending  In ED patient given: Azithromycin, Ceftriaxone   (20 Nov 2023 13:03)      PAST MEDICAL & SURGICAL HISTORY:  Hypertension  High cholesterol  COPD (chronic obstructive pulmonary disease)  GERD (gastroesophageal reflux disease)  PTSD (post-traumatic stress disorder) September 11th 2001  Osteoarthrosis, localized  Sleep apnea - does not use CPAP machine      Prediabetes      Iron deficiency anemia      S/P knee replacement, right      S/P tonsillectomy and adenoidectomy      S/P wrist surgery  ganglion cyst      S/P arthroscopic surgery of left knee          Antimicrobials  azithromycin  IVPB 500 milliGRAM(s) IV Intermittent every 24 hours  cefTRIAXone   IVPB 1000 milliGRAM(s) IV Intermittent every 24 hours      Immunological      Other  acetaminophen     Tablet .. 650 milliGRAM(s) Oral every 6 hours PRN  albuterol    90 MICROgram(s) HFA Inhaler 2 Puff(s) Inhalation every 6 hours PRN  albuterol/ipratropium for Nebulization 3 milliLiter(s) Nebulizer every 6 hours  aluminum hydroxide/magnesium hydroxide/simethicone Suspension 30 milliLiter(s) Oral every 4 hours PRN  amLODIPine   Tablet 5 milliGRAM(s) Oral daily  ARIPiprazole 20 milliGRAM(s) Oral daily  atorvastatin 20 milliGRAM(s) Oral at bedtime  budesonide 160 MICROgram(s)/formoterol 4.5 MICROgram(s) Inhaler 2 Puff(s) Inhalation two times a day  buPROPion XL (24-Hour) . 300 milliGRAM(s) Oral daily  famotidine    Tablet 20 milliGRAM(s) Oral two times a day  LORazepam     Tablet 1.5 milliGRAM(s) Oral <User Schedule>  LORazepam     Tablet 1 milliGRAM(s) Oral <User Schedule>  melatonin 3 milliGRAM(s) Oral at bedtime PRN  ondansetron Injectable 4 milliGRAM(s) IV Push every 8 hours PRN  pantoprazole    Tablet 40 milliGRAM(s) Oral every 12 hours  sertraline 200 milliGRAM(s) Oral daily  traZODone 150 milliGRAM(s) Oral at bedtime      Allergies    No Known Drug Allergies  latex (Rash)    Intolerances        SOCIAL HISTORY:  Tobacco: Former, quit 30 years ago  EtOH:  Denies drinking any alcohol  Recreational drug use: Occasionally smokes marijuana  Lives with: Wife and daughter and son-in-law in mother daughter home  Ambulates: w/o assistive device  ADLs: independent (20 Nov 2023 13:03)      FAMILY HISTORY:  FH: type 2 diabetes (Father)        ROS:    EYES:  Negative  blurry vision or double vision  GASTROINTESTINAL:  Negative for nausea, vomiting, diarrhea  -otherwise negative except for subjective    Vital Signs Last 24 Hrs  T(C): 36.4 (20 Nov 2023 09:45), Max: 36.7 (20 Nov 2023 08:16)  T(F): 97.6 (20 Nov 2023 09:45), Max: 98 (20 Nov 2023 08:16)  HR: 71 (20 Nov 2023 09:45) (71 - 96)  BP: 151/94 (20 Nov 2023 09:45) (148/100 - 151/94)  BP(mean): --  RR: 18 (20 Nov 2023 09:45) (18 - 18)  SpO2: 99% (20 Nov 2023 09:45) (91% - 99%)    Parameters below as of 20 Nov 2023 09:45  Patient On (Oxygen Delivery Method): nasal cannula  O2 Flow (L/min): 2      PE:  In no distress. Obese  HEENT:  NC, PERRL, sclerae anicteric, conjunctivae clear, EOMI.  Sinuses nontender, no nasal exudate.  No buccal or pharyngeal lesions, erythema or exudate  Neck:  Supple, no adenopathy  Lungs:  No accessory muscle use, bilaterally areas of dec BS symmetrical  Cor:  distant  Abd:  Symmetric, normoactive BS.  Soft, nontender, no masses, guarding or rebound.  Liver and spleen not enlarged  Extrem:  No cyanosis or edema  Skin:  No rashes.  Neuro: grossly intact  Musc: moving all limbs freely, no focal deficits        LABS:                        7.8    14.44 )-----------( 289      ( 20 Nov 2023 09:20 )             27.4       WBC Count: 14.44 K/uL (11-20-23 @ 09:20)      11-20    143  |  107  |  24<H>  ----------------------------<  130<H>  4.4   |  31  |  1.00    Ca    8.9      20 Nov 2023 09:20    TPro  7.9  /  Alb  3.6  /  TBili  0.5  /  DBili  x   /  AST  69<H>  /  ALT  101<H>  /  AlkPhos  103  11-20      Creatinine: 1.00 mg/dL (11-20-23 @ 09:20)      Urinalysis Basic - ( 20 Nov 2023 09:20 )    Color: x / Appearance: x / SG: x / pH: x  Gluc: 130 mg/dL / Ketone: x  / Bili: x / Urobili: x   Blood: x / Protein: x / Nitrite: x   Leuk Esterase: x / RBC: x / WBC x   Sq Epi: x / Non Sq Epi: x / Bacteria: x              MICROBIOLOGY:      RADIOLOGY & ADDITIONAL STUDIES:    --< from: CT Chest w/ IV Cont (11.20.23 @ 11:32) >    ACC: [id number] EXAM:  CT ABDOMEN AND PELVIS IC   ORDERED BY: KIRSTY LOWRY     ACC: [id number] EXAM:  CT CHEST IC   ORDERED BY: KIRSTY LOWRY     PROCEDURE DATE:  11/20/2023          INTERPRETATION:  CLINICAL INFORMATION: Lethargic.    COMPARISON: CT abdomen and pelvis 3/15/2019    CONTRAST/COMPLICATIONS:  IV Contrast: Omnipaque 350 (accession [id number]), IV contrast documented   in unlinked concurrent exam (accession [id number])  90 cc administered   10   cc discarded  Oral Contrast: NONE  Complications: None reported at time of study completion    PROCEDURE:  CT of the Chest, Abdomen and Pelvis was performed.  Sagittal and coronal reformats were performed.    FINDINGS:  CHEST:  LUNGS AND LARGE AIRWAYS: Patent central airways. Ground glass opacities   in the right upper lobe posteriorly. Scattered similar opacities in the   right lower lobe and left upper lobe. 5 mm pleural-based nodule in the   left lower lobe (2-63), similar to CT 3/15/2019.  PLEURA: No pleural effusion.  VESSELS: Within normal limits.  HEART: Heart size is normal. No pericardial effusion.  MEDIASTINUM AND TEREZA: Slightly enlarged mediastinal and right hilar lymph   nodes, for example a right lower hilar lymph node measuring up to 1.8 cm   in short axis, nonspecific and possibly reactive.  CHEST WALL AND LOWER NECK: Within normal limits.    ABDOMEN AND PELVIS:  LIVER: Within normal limits.  BILE DUCTS: Normal caliber.  GALLBLADDER: Within normal limits.  SPLEEN: Within normal limits.  PANCREAS: Within normal limits.  ADRENALS: Within normal limits.  KIDNEYS/URETERS: Within normal limits.    BLADDER: Within normal limits.  REPRODUCTIVE ORGANS: Prostate within normal limits.    BOWEL: No bowel obstruction. Appendix is normal. Descending and sigmoid   colon diverticulosis without diverticulitis.  PERITONEUM: No ascites.  VESSELS: Mild atherosclerotic changes.  RETROPERITONEUM/LYMPH NODES: No lymphadenopathy.  ABDOMINAL WALL: Within normal limits.  BONES: Degenerative changes.    IMPRESSION:  Findings concerning for multifocal pneumonia, most prominent in the right   upper lobe. Short interval follow-up is recommended following resolution   of symptoms. Slightly enlarged mediastinal and right hilar lymph nodes,   nonspecific and possibly reactive.    No acute pathology in the abdomen or pelvis.

## 2023-11-20 NOTE — H&P ADULT - NSICDXPASTMEDICALHX_GEN_ALL_CORE_FT
PAST MEDICAL HISTORY:  COPD (chronic obstructive pulmonary disease)     GERD (gastroesophageal reflux disease)     High cholesterol     Hypertension     Iron deficiency anemia     Osteoarthrosis, localized     Prediabetes     PTSD (post-traumatic stress disorder) September 11th 2001    Sleep apnea does not use CPAP machine

## 2023-11-20 NOTE — ED ADULT NURSE NOTE - ED STAT RN HANDOFF DETAILS
pt report given to RN [facility] patient is A&Ox3 vital signs within normal limits for patient. patient denies chest pain, or shortness of breath.  Pt clean skin in tact resting .  safety precautions maintained.  will continue to assess and monitor for safety. pt is being transported up to unit with nursing supervisor and transporter on monitor

## 2023-11-20 NOTE — ED PROVIDER NOTE - OBJECTIVE STATEMENT
pt states that he's just been feeling very tired and weak over the last 2 days, able to stay alert during HPI but with some effort, poor historian. reports going on a hunting trip a couple of days ago and then started not to feel well but not able to be more specific.

## 2023-11-20 NOTE — H&P ADULT - PROBLEM SELECTOR PLAN 1
h/o bronchitis 1 month ago treated with two rounds of abx and steroids  -CT Chest: Findings concerning for multifocal pneumonia, most prominent in the right  upper lobe. Slightly enlarged mediastinal and right hilar lymph nodes, nonspecific and possibly reactive.  -WBC 14.44, Neutrophils 83.5%  -Continue Rocephin, Azithromycin w/ presumed CAP  -Monitor for fever, trend WBC  -Pt advised to get f/u chest x-ray 4-6 weeks from discharge for resolution of pna h/o bronchitis 1 month ago treated with two rounds of abx and steroids  -CT Chest: Findings concerning for multifocal pneumonia, most prominent in the right  upper lobe. Slightly enlarged mediastinal and right hilar lymph nodes, nonspecific and possibly reactive.  -WBC 14.44, Neutrophils 83.5%  -Continue Rocephin, Azithromycin w/ presumed CAP  -Monitor for fever, trend WBC  -Pt advised to get f/u chest  imaging 4-6 weeks from discharge for resolution of pna to be arranged by PCP. Lympadenoapthy possible reactive due to infection  -lung nodule seen on CT scan- patient informed and instructed to f/u with PCP to discuss surveillance imaging at next appt  -Dr Funes consulted  -Dr Mart consulted

## 2023-11-21 ENCOUNTER — TRANSCRIPTION ENCOUNTER (OUTPATIENT)
Age: 68
End: 2023-11-21

## 2023-11-21 DIAGNOSIS — R91.8 OTHER NONSPECIFIC ABNORMAL FINDING OF LUNG FIELD: ICD-10-CM

## 2023-11-21 LAB
A1C WITH ESTIMATED AVERAGE GLUCOSE RESULT: 6.5 % — HIGH (ref 4–5.6)
A1C WITH ESTIMATED AVERAGE GLUCOSE RESULT: 6.5 % — HIGH (ref 4–5.6)
ALBUMIN SERPL ELPH-MCNC: 3.1 G/DL — LOW (ref 3.3–5)
ALBUMIN SERPL ELPH-MCNC: 3.1 G/DL — LOW (ref 3.3–5)
ALP SERPL-CCNC: 86 U/L — SIGNIFICANT CHANGE UP (ref 40–120)
ALP SERPL-CCNC: 86 U/L — SIGNIFICANT CHANGE UP (ref 40–120)
ALT FLD-CCNC: 89 U/L — HIGH (ref 12–78)
ALT FLD-CCNC: 89 U/L — HIGH (ref 12–78)
ANION GAP SERPL CALC-SCNC: 2 MMOL/L — LOW (ref 5–17)
ANION GAP SERPL CALC-SCNC: 2 MMOL/L — LOW (ref 5–17)
APTT BLD: 29.3 SEC — SIGNIFICANT CHANGE UP (ref 24.5–35.6)
APTT BLD: 29.3 SEC — SIGNIFICANT CHANGE UP (ref 24.5–35.6)
AST SERPL-CCNC: 51 U/L — HIGH (ref 15–37)
AST SERPL-CCNC: 51 U/L — HIGH (ref 15–37)
BASOPHILS # BLD AUTO: 0.04 K/UL — SIGNIFICANT CHANGE UP (ref 0–0.2)
BASOPHILS # BLD AUTO: 0.04 K/UL — SIGNIFICANT CHANGE UP (ref 0–0.2)
BASOPHILS NFR BLD AUTO: 0.3 % — SIGNIFICANT CHANGE UP (ref 0–2)
BASOPHILS NFR BLD AUTO: 0.3 % — SIGNIFICANT CHANGE UP (ref 0–2)
BILIRUB DIRECT SERPL-MCNC: 0.2 MG/DL — SIGNIFICANT CHANGE UP (ref 0–0.3)
BILIRUB DIRECT SERPL-MCNC: 0.2 MG/DL — SIGNIFICANT CHANGE UP (ref 0–0.3)
BILIRUB INDIRECT FLD-MCNC: 0.3 MG/DL — SIGNIFICANT CHANGE UP (ref 0.2–1)
BILIRUB INDIRECT FLD-MCNC: 0.3 MG/DL — SIGNIFICANT CHANGE UP (ref 0.2–1)
BILIRUB SERPL-MCNC: 0.5 MG/DL — SIGNIFICANT CHANGE UP (ref 0.2–1.2)
BILIRUB SERPL-MCNC: 0.5 MG/DL — SIGNIFICANT CHANGE UP (ref 0.2–1.2)
BUN SERPL-MCNC: 20 MG/DL — SIGNIFICANT CHANGE UP (ref 7–23)
BUN SERPL-MCNC: 20 MG/DL — SIGNIFICANT CHANGE UP (ref 7–23)
CALCIUM SERPL-MCNC: 8 MG/DL — LOW (ref 8.5–10.1)
CALCIUM SERPL-MCNC: 8 MG/DL — LOW (ref 8.5–10.1)
CHLORIDE SERPL-SCNC: 105 MMOL/L — SIGNIFICANT CHANGE UP (ref 96–108)
CHLORIDE SERPL-SCNC: 105 MMOL/L — SIGNIFICANT CHANGE UP (ref 96–108)
CHOLEST SERPL-MCNC: 123 MG/DL — SIGNIFICANT CHANGE UP
CHOLEST SERPL-MCNC: 123 MG/DL — SIGNIFICANT CHANGE UP
CO2 SERPL-SCNC: 33 MMOL/L — HIGH (ref 22–31)
CO2 SERPL-SCNC: 33 MMOL/L — HIGH (ref 22–31)
CREAT SERPL-MCNC: 0.76 MG/DL — SIGNIFICANT CHANGE UP (ref 0.5–1.3)
CREAT SERPL-MCNC: 0.76 MG/DL — SIGNIFICANT CHANGE UP (ref 0.5–1.3)
CRP SERPL-MCNC: 16 MG/L — HIGH
CRP SERPL-MCNC: 16 MG/L — HIGH
EGFR: 99 ML/MIN/1.73M2 — SIGNIFICANT CHANGE UP
EGFR: 99 ML/MIN/1.73M2 — SIGNIFICANT CHANGE UP
EOSINOPHIL # BLD AUTO: 0.39 K/UL — SIGNIFICANT CHANGE UP (ref 0–0.5)
EOSINOPHIL # BLD AUTO: 0.39 K/UL — SIGNIFICANT CHANGE UP (ref 0–0.5)
EOSINOPHIL NFR BLD AUTO: 3.4 % — SIGNIFICANT CHANGE UP (ref 0–6)
EOSINOPHIL NFR BLD AUTO: 3.4 % — SIGNIFICANT CHANGE UP (ref 0–6)
ESTIMATED AVERAGE GLUCOSE: 140 MG/DL — HIGH (ref 68–114)
ESTIMATED AVERAGE GLUCOSE: 140 MG/DL — HIGH (ref 68–114)
FERRITIN SERPL-MCNC: 14 NG/ML — LOW (ref 30–400)
FERRITIN SERPL-MCNC: 14 NG/ML — LOW (ref 30–400)
GLUCOSE SERPL-MCNC: 127 MG/DL — HIGH (ref 70–99)
GLUCOSE SERPL-MCNC: 127 MG/DL — HIGH (ref 70–99)
HCT VFR BLD CALC: 23.8 % — LOW (ref 39–50)
HCT VFR BLD CALC: 23.8 % — LOW (ref 39–50)
HDLC SERPL-MCNC: 45 MG/DL — SIGNIFICANT CHANGE UP
HDLC SERPL-MCNC: 45 MG/DL — SIGNIFICANT CHANGE UP
HGB BLD-MCNC: 7.1 G/DL — LOW (ref 13–17)
HGB BLD-MCNC: 7.1 G/DL — LOW (ref 13–17)
IMM GRANULOCYTES NFR BLD AUTO: 0.6 % — SIGNIFICANT CHANGE UP (ref 0–0.9)
IMM GRANULOCYTES NFR BLD AUTO: 0.6 % — SIGNIFICANT CHANGE UP (ref 0–0.9)
INR BLD: 1.15 RATIO — SIGNIFICANT CHANGE UP (ref 0.85–1.18)
INR BLD: 1.15 RATIO — SIGNIFICANT CHANGE UP (ref 0.85–1.18)
IRON SATN MFR SERPL: 13 UG/DL — LOW (ref 45–165)
IRON SATN MFR SERPL: 13 UG/DL — LOW (ref 45–165)
IRON SATN MFR SERPL: 4 % — LOW (ref 16–55)
IRON SATN MFR SERPL: 4 % — LOW (ref 16–55)
LIPID PNL WITH DIRECT LDL SERPL: 62 MG/DL — SIGNIFICANT CHANGE UP
LIPID PNL WITH DIRECT LDL SERPL: 62 MG/DL — SIGNIFICANT CHANGE UP
LYMPHOCYTES # BLD AUTO: 0.83 K/UL — LOW (ref 1–3.3)
LYMPHOCYTES # BLD AUTO: 0.83 K/UL — LOW (ref 1–3.3)
LYMPHOCYTES # BLD AUTO: 7.1 % — LOW (ref 13–44)
LYMPHOCYTES # BLD AUTO: 7.1 % — LOW (ref 13–44)
MCHC RBC-ENTMCNC: 22.7 PG — LOW (ref 27–34)
MCHC RBC-ENTMCNC: 22.7 PG — LOW (ref 27–34)
MCHC RBC-ENTMCNC: 29.8 GM/DL — LOW (ref 32–36)
MCHC RBC-ENTMCNC: 29.8 GM/DL — LOW (ref 32–36)
MCV RBC AUTO: 76 FL — LOW (ref 80–100)
MCV RBC AUTO: 76 FL — LOW (ref 80–100)
MONOCYTES # BLD AUTO: 0.98 K/UL — HIGH (ref 0–0.9)
MONOCYTES # BLD AUTO: 0.98 K/UL — HIGH (ref 0–0.9)
MONOCYTES NFR BLD AUTO: 8.4 % — SIGNIFICANT CHANGE UP (ref 2–14)
MONOCYTES NFR BLD AUTO: 8.4 % — SIGNIFICANT CHANGE UP (ref 2–14)
NEUTROPHILS # BLD AUTO: 9.33 K/UL — HIGH (ref 1.8–7.4)
NEUTROPHILS # BLD AUTO: 9.33 K/UL — HIGH (ref 1.8–7.4)
NEUTROPHILS NFR BLD AUTO: 80.2 % — HIGH (ref 43–77)
NEUTROPHILS NFR BLD AUTO: 80.2 % — HIGH (ref 43–77)
NON HDL CHOLESTEROL: 77 MG/DL — SIGNIFICANT CHANGE UP
NON HDL CHOLESTEROL: 77 MG/DL — SIGNIFICANT CHANGE UP
NRBC # BLD: 0 /100 WBCS — SIGNIFICANT CHANGE UP (ref 0–0)
NRBC # BLD: 0 /100 WBCS — SIGNIFICANT CHANGE UP (ref 0–0)
PLATELET # BLD AUTO: 238 K/UL — SIGNIFICANT CHANGE UP (ref 150–400)
PLATELET # BLD AUTO: 238 K/UL — SIGNIFICANT CHANGE UP (ref 150–400)
POTASSIUM SERPL-MCNC: 4 MMOL/L — SIGNIFICANT CHANGE UP (ref 3.5–5.3)
POTASSIUM SERPL-MCNC: 4 MMOL/L — SIGNIFICANT CHANGE UP (ref 3.5–5.3)
POTASSIUM SERPL-SCNC: 4 MMOL/L — SIGNIFICANT CHANGE UP (ref 3.5–5.3)
POTASSIUM SERPL-SCNC: 4 MMOL/L — SIGNIFICANT CHANGE UP (ref 3.5–5.3)
PROT SERPL-MCNC: 6.7 G/DL — SIGNIFICANT CHANGE UP (ref 6–8.3)
PROT SERPL-MCNC: 6.7 G/DL — SIGNIFICANT CHANGE UP (ref 6–8.3)
PROTHROM AB SERPL-ACNC: 13.4 SEC — HIGH (ref 9.5–13)
PROTHROM AB SERPL-ACNC: 13.4 SEC — HIGH (ref 9.5–13)
RBC # BLD: 3.13 M/UL — LOW (ref 4.2–5.8)
RBC # BLD: 3.13 M/UL — LOW (ref 4.2–5.8)
RBC # FLD: 16 % — HIGH (ref 10.3–14.5)
RBC # FLD: 16 % — HIGH (ref 10.3–14.5)
SODIUM SERPL-SCNC: 140 MMOL/L — SIGNIFICANT CHANGE UP (ref 135–145)
SODIUM SERPL-SCNC: 140 MMOL/L — SIGNIFICANT CHANGE UP (ref 135–145)
TIBC SERPL-MCNC: 374 UG/DL — SIGNIFICANT CHANGE UP (ref 220–430)
TIBC SERPL-MCNC: 374 UG/DL — SIGNIFICANT CHANGE UP (ref 220–430)
TRANSFERRIN SERPL-MCNC: 280 MG/DL — SIGNIFICANT CHANGE UP (ref 200–360)
TRANSFERRIN SERPL-MCNC: 280 MG/DL — SIGNIFICANT CHANGE UP (ref 200–360)
TRIGL SERPL-MCNC: 79 MG/DL — SIGNIFICANT CHANGE UP
TRIGL SERPL-MCNC: 79 MG/DL — SIGNIFICANT CHANGE UP
UIBC SERPL-MCNC: 361 UG/DL — SIGNIFICANT CHANGE UP (ref 110–370)
UIBC SERPL-MCNC: 361 UG/DL — SIGNIFICANT CHANGE UP (ref 110–370)
WBC # BLD: 11.64 K/UL — HIGH (ref 3.8–10.5)
WBC # BLD: 11.64 K/UL — HIGH (ref 3.8–10.5)
WBC # FLD AUTO: 11.64 K/UL — HIGH (ref 3.8–10.5)
WBC # FLD AUTO: 11.64 K/UL — HIGH (ref 3.8–10.5)

## 2023-11-21 PROCEDURE — 99233 SBSQ HOSP IP/OBS HIGH 50: CPT | Mod: GC

## 2023-11-21 RX ORDER — POLYETHYLENE GLYCOL 3350 17 G/17G
17 POWDER, FOR SOLUTION ORAL
Refills: 0 | Status: DISCONTINUED | OUTPATIENT
Start: 2023-11-21 | End: 2023-11-25

## 2023-11-21 RX ORDER — FERROUS SULFATE 325(65) MG
325 TABLET ORAL THREE TIMES A DAY
Refills: 0 | Status: COMPLETED | OUTPATIENT
Start: 2023-11-21 | End: 2023-11-22

## 2023-11-21 RX ORDER — AZITHROMYCIN 500 MG/1
500 TABLET, FILM COATED ORAL DAILY
Refills: 0 | Status: COMPLETED | OUTPATIENT
Start: 2023-11-21 | End: 2023-11-22

## 2023-11-21 RX ORDER — AMLODIPINE BESYLATE 2.5 MG/1
10 TABLET ORAL DAILY
Refills: 0 | Status: DISCONTINUED | OUTPATIENT
Start: 2023-11-21 | End: 2023-11-24

## 2023-11-21 RX ADMIN — BUDESONIDE AND FORMOTEROL FUMARATE DIHYDRATE 2 PUFF(S): 160; 4.5 AEROSOL RESPIRATORY (INHALATION) at 20:45

## 2023-11-21 RX ADMIN — Medication 325 MILLIGRAM(S): at 21:30

## 2023-11-21 RX ADMIN — AMLODIPINE BESYLATE 5 MILLIGRAM(S): 2.5 TABLET ORAL at 05:59

## 2023-11-21 RX ADMIN — Medication 1 MILLIGRAM(S): at 12:50

## 2023-11-21 RX ADMIN — SERTRALINE 200 MILLIGRAM(S): 25 TABLET, FILM COATED ORAL at 11:58

## 2023-11-21 RX ADMIN — Medication 1.5 MILLIGRAM(S): at 20:46

## 2023-11-21 RX ADMIN — BUPROPION HYDROCHLORIDE 300 MILLIGRAM(S): 150 TABLET, EXTENDED RELEASE ORAL at 11:58

## 2023-11-21 RX ADMIN — AZITHROMYCIN 500 MILLIGRAM(S): 500 TABLET, FILM COATED ORAL at 11:57

## 2023-11-21 RX ADMIN — Medication 3 MILLILITER(S): at 19:08

## 2023-11-21 RX ADMIN — Medication 325 MILLIGRAM(S): at 12:50

## 2023-11-21 RX ADMIN — FAMOTIDINE 20 MILLIGRAM(S): 10 INJECTION INTRAVENOUS at 05:59

## 2023-11-21 RX ADMIN — ATORVASTATIN CALCIUM 20 MILLIGRAM(S): 80 TABLET, FILM COATED ORAL at 21:31

## 2023-11-21 RX ADMIN — POLYETHYLENE GLYCOL 3350 17 GRAM(S): 17 POWDER, FOR SOLUTION ORAL at 17:32

## 2023-11-21 RX ADMIN — Medication 3 MILLILITER(S): at 08:02

## 2023-11-21 RX ADMIN — PANTOPRAZOLE SODIUM 40 MILLIGRAM(S): 20 TABLET, DELAYED RELEASE ORAL at 17:32

## 2023-11-21 RX ADMIN — FAMOTIDINE 20 MILLIGRAM(S): 10 INJECTION INTRAVENOUS at 17:32

## 2023-11-21 RX ADMIN — ARIPIPRAZOLE 20 MILLIGRAM(S): 15 TABLET ORAL at 11:57

## 2023-11-21 RX ADMIN — Medication 3 MILLILITER(S): at 14:09

## 2023-11-21 RX ADMIN — Medication 150 MILLIGRAM(S): at 21:31

## 2023-11-21 RX ADMIN — CEFTRIAXONE 100 MILLIGRAM(S): 500 INJECTION, POWDER, FOR SOLUTION INTRAMUSCULAR; INTRAVENOUS at 20:45

## 2023-11-21 RX ADMIN — Medication 20 MILLIGRAM(S): at 05:59

## 2023-11-21 RX ADMIN — BUDESONIDE AND FORMOTEROL FUMARATE DIHYDRATE 2 PUFF(S): 160; 4.5 AEROSOL RESPIRATORY (INHALATION) at 06:06

## 2023-11-21 RX ADMIN — PANTOPRAZOLE SODIUM 40 MILLIGRAM(S): 20 TABLET, DELAYED RELEASE ORAL at 05:59

## 2023-11-21 RX ADMIN — Medication 1.5 MILLIGRAM(S): at 05:59

## 2023-11-21 NOTE — DISCHARGE NOTE PROVIDER - NSDCMRMEDTOKEN_GEN_ALL_CORE_FT
Albuterol (Eqv-ProAir HFA) 90 mcg/inh inhalation aerosol: 2 puff(s) inhaled 4 times a day as needed for  bronchospasm  amLODIPine 5 mg oral tablet: 1 tab(s) orally once a day  ARIPiprazole 20 mg oral tablet: 1 tab(s) orally once a day (at bedtime)  atorvastatin 20 mg oral tablet: 1 tab(s) orally once a day  buPROPion 300 mg/24 hours (XL) oral tablet, extended release: 1 tab(s) orally once a day  famotidine 20 mg oral tablet: 1 tab(s) orally 2 times a day  LORazepam 0.5 mg oral tablet: 1 tab(s) orally 3 times a day Patient takes 1.5mg in the morning, 1mg at lunch, and 1.5mg at night  NexIUM 40 mg oral delayed release capsule: 1 cap(s) orally 2 times a day  sertraline 100 mg oral tablet: 2 tab(s) orally once a day  Symbicort 160 mcg-4.5 mcg/inh inhalation aerosol: 2 puff(s) inhaled once a day  traZODone 150 mg oral tablet: 1 tab(s) orally once a day (at bedtime)  zolpidem 12.5 mg oral tablet, extended release: 1 tab(s) orally once a day (at bedtime)   Albuterol (Eqv-ProAir HFA) 90 mcg/inh inhalation aerosol: 2 puff(s) inhaled 4 times a day as needed for  bronchospasm  amLODIPine 5 mg oral tablet: 1 tab(s) orally once a day  ARIPiprazole 20 mg oral tablet: 1 tab(s) orally once a day (at bedtime)  atorvastatin 20 mg oral tablet: 1 tab(s) orally once a day  buPROPion 300 mg/24 hours (XL) oral tablet, extended release: 1 tab(s) orally once a day  cefuroxime 500 mg oral tablet: 1 tab(s) orally 2 times a day  famotidine 20 mg oral tablet: 1 tab(s) orally 2 times a day  ferrous sulfate 325 mg (65 mg elemental iron) oral delayed release tablet: 1 tab(s) orally once a day  LORazepam 0.5 mg oral tablet: 1 tab(s) orally 3 times a day Patient takes 1.5mg in the morning, 1mg at lunch, and 1.5mg at night  NexIUM 40 mg oral delayed release capsule: 1 cap(s) orally 2 times a day  predniSONE 20 mg oral tablet: 1 tab(s) orally once a day  sertraline 100 mg oral tablet: 2 tab(s) orally once a day  Symbicort 160 mcg-4.5 mcg/inh inhalation aerosol: 2 puff(s) inhaled once a day  traZODone 150 mg oral tablet: 1 tab(s) orally once a day (at bedtime)  zolpidem 12.5 mg oral tablet, extended release: 1 tab(s) orally once a day (at bedtime)   Albuterol (Eqv-ProAir HFA) 90 mcg/inh inhalation aerosol: 2 puff(s) inhaled 4 times a day as needed for  bronchospasm  apixaban 5 mg oral tablet: 1 tab(s) orally every 12 hours  ARIPiprazole 20 mg oral tablet: 1 tab(s) orally once a day (at bedtime)  atorvastatin 20 mg oral tablet: 1 tab(s) orally once a day  buPROPion 300 mg/24 hours (XL) oral tablet, extended release: 1 tab(s) orally once a day  famotidine 20 mg oral tablet: 1 tab(s) orally 2 times a day  ferrous sulfate 325 mg (65 mg elemental iron) oral delayed release tablet: 1 tab(s) orally once a day start in 1 week  LORazepam 0.5 mg oral tablet: 1 tab(s) orally 3 times a day Patient takes 1.5mg in the morning, 1mg at lunch, and 1.5mg at night  losartan 25 mg oral tablet: 1 tab(s) orally once a day  metoprolol succinate 50 mg oral tablet, extended release: 1 tab(s) orally once a day  NexIUM 40 mg oral delayed release capsule: 1 cap(s) orally 2 times a day  sertraline 100 mg oral tablet: 2 tab(s) orally once a day  Symbicort 160 mcg-4.5 mcg/inh inhalation aerosol: 2 puff(s) inhaled once a day  traZODone 150 mg oral tablet: 1 tab(s) orally once a day (at bedtime)  zolpidem 12.5 mg oral tablet, extended release: 1 tab(s) orally once a day (at bedtime)

## 2023-11-21 NOTE — PROGRESS NOTE ADULT - ASSESSMENT
67 yo male w/ pmh COPD, HTN, HLD, Hypertriglyceridemia, BRIAN, Prediabetes, Anxiety/Depression, PTSD, GERD, BOOKER presents with weakness x2 weeks.  Daughter found him home, blue lips WBC 14.44,   CT Chest: Findings concerning for multifocal pneumonia, most prominent in the right upper lobe. Slightly enlarged mediastinal and right hilar lymph nodes, nonspecific and possibly reactive.    RECOMMENDATIONS  1-PNA compelling story and agree with ATS guideline based Rx  Ceftriaxone started 11/20 with potential to finish course with Cefuroxime 500mg PO BID last day 11/24  Azithromycin started 11/20 changed to PO 11/21 w 11/22 as last day  RVP-neg  -urine Legionella-pending collection  -nares MRSA pending collection    2-Anemia daughter asking about plans for anemia    Thank you for consulting us and involving us in the management of this most interesting and challenging case.  We will follow along in the care of this patient. Please call us at [phone] or text me directly on my cell# at [phone] with any concerns.

## 2023-11-21 NOTE — PROGRESS NOTE ADULT - PROBLEM SELECTOR PLAN 5
Pt has had unchanged medication regiment x10-15 years  -Continue aripiprazole, sertraline, trazodone, buproprion  -Lorazepam daily dosing 1.5mg am and qhs, 1mg at lunch  -f/u QTc Pt has had unchanged medication regiment x10-15 years  -Continue aripiprazole, sertraline, trazodone, buproprion  -Lorazepam daily dosing 1.5mg am and qhs, 1mg at lunch  -QTC wnl

## 2023-11-21 NOTE — DISCHARGE NOTE PROVIDER - HOSPITAL COURSE
HPI:  69 yo male w/ pmh COPD, HTN, HLD, Hypertriglyceridemia, BRIAN, Prediabetes, Anxiety/Depression, PTSD, GERD, BOOKER presents with weakness x2 weeks. HPI aided by daughter as patient is a poor historian. States he had bronchitis about 1 month ago that was treated outpatient with "two rounds of antibiotics" and steroids. The patient went hunting 2 days ago [facility] and states that he fell asleep multiple times while on the trip. Denies any tick bites within the past month. Pt states that he has had an occasional cough, not increased from his baseline cough with his COPD. Endorses having to receive iron supplementation in the past due to his BRIAN. Pt denies any fever, chills, sob, cp, palpitations, dizziness, arthralgia, vision change, abd pain, n/v/d/c.    ED course  Vitals: T 97.6, HR 71, /94, RR 18, SpO2 99% on RA  Significant labs: WBC 14.44, Neutrohil 83.5%, HgB/Hct 7.8/27.4, AST 69, , ABG: ph 7.40, pCO2 58, pO2 92, HCO3 36, Base Excess, Arterial 11.1, Oxygen Saturation 99.1  Imaging: CT Chest: Findings concerning for multifocal pneumonia, most prominent in the right upper lobe.Slightly enlarged mediastinal and right hilar lymph nodes, nonspecific and possibly reactive.  EKG: pending  In ED patient given: Azithromycin, Ceftriaxone   (20 Nov 2023 13:03)      ---  HOSPITAL COURSE: Patient admitted to medicine floor for management of Multifocal PNA.    Pt seen and examined on day of discharge. Patient is medically optimized for discharge to home with close outpatient followup.    PHYSICAL EXAM ON DAY OF DISCHARGE:  The patient was seen and examined on the day of discharge. Please see progress note from day of discharge for further information.         ---  CONSULTANTS:   Cards: Dr. Duong  ---  TIME SPENT:  I, the attending physician, was physically present for the key portions of the evaluation and management (E/M) service provided. The total amount of time spent reviewing the hospital notes, laboratory values, imaging findings, assessing/counseling the patient, discussing with consultant physicians, social work, nursing staff was -- minutes    ---  Primary care provider was made aware of plan for discharge:      [  ] NO     [  ] YES   HPI:  69 yo male w/ pmh COPD, HTN, HLD, Hypertriglyceridemia, BRIAN, Prediabetes, Anxiety/Depression, PTSD, GERD, BOOKER presents with weakness x2 weeks. HPI aided by daughter as patient is a poor historian. States he had bronchitis about 1 month ago that was treated outpatient with "two rounds of antibiotics" and steroids. The patient went hunting 2 days ago [facility] and states that he fell asleep multiple times while on the trip. Denies any tick bites within the past month. Pt states that he has had an occasional cough, not increased from his baseline cough with his COPD. Endorses having to receive iron supplementation in the past due to his BRIAN. Pt denies any fever, chills, sob, cp, palpitations, dizziness, arthralgia, vision change, abd pain, n/v/d/c.    ED course  Vitals: T 97.6, HR 71, /94, RR 18, SpO2 99% on RA  Significant labs: WBC 14.44, Neutrohil 83.5%, HgB/Hct 7.8/27.4, AST 69, , ABG: ph 7.40, pCO2 58, pO2 92, HCO3 36, Base Excess, Arterial 11.1, Oxygen Saturation 99.1  Imaging: CT Chest: Findings concerning for multifocal pneumonia, most prominent in the right upper lobe.Slightly enlarged mediastinal and right hilar lymph nodes, nonspecific and possibly reactive.  EKG: pending  In ED patient given: Azithromycin, Ceftriaxone   (20 Nov 2023 13:03)      ---  HOSPITAL COURSE: Patient admitted to medicine floor for management of Multifocal PNA. Treated with IV antibiotics - Rocephin/Azithro. Continued on just rocephin and was to transition to PO abx 11/23. He was not compliant with CPAP in the hospital. Also remains on supplemental O2 via nasal cannula. He was newly diagnosed with DM2 during his stay as well. Patient with MDM capacity and wanting to leave AMA despite risks being clearly explained to him. Verbalized understanding of the risks. D/c on 2 days of cefuroxime and prednisone. Also sent prescription for supplemental iron. Patient left AMA.      ---  CONSULTANTS:   Cards: Dr. Ad Mart HPI:  69 yo male w/ pmh COPD, HTN, HLD, Hypertriglyceridemia, BRIAN, Prediabetes, Anxiety/Depression, PTSD, GERD, BOOKER presents with weakness x2 weeks. HPI aided by daughter as patient is a poor historian. States he had bronchitis about 1 month ago that was treated outpatient with "two rounds of antibiotics" and steroids. The patient went hunting 2 days ago [facility] and states that he fell asleep multiple times while on the trip. Denies any tick bites within the past month. Pt states that he has had an occasional cough, not increased from his baseline cough with his COPD. Endorses having to receive iron supplementation in the past due to his BRIAN. Pt denies any fever, chills, sob, cp, palpitations, dizziness, arthralgia, vision change, abd pain, n/v/d/c.    ED course  Vitals: T 97.6, HR 71, /94, RR 18, SpO2 99% on RA  Significant labs: WBC 14.44, Neutrohil 83.5%, HgB/Hct 7.8/27.4, AST 69, , ABG: ph 7.40, pCO2 58, pO2 92, HCO3 36, Base Excess, Arterial 11.1, Oxygen Saturation 99.1  Imaging: CT Chest: Findings concerning for multifocal pneumonia, most prominent in the right upper lobe.Slightly enlarged mediastinal and right hilar lymph nodes, nonspecific and possibly reactive.  EKG: pending  In ED patient given: Azithromycin, Ceftriaxone   (20 Nov 2023 13:03)      ---  HOSPITAL COURSE: Patient admitted to medicine floor for management of Multifocal PNA. Treated with IV antibiotics - Rocephin/Azithro. Continued on just rocephin and was to transition to PO abx 11/23. He was not compliant with CPAP in the hospital. Also remains on supplemental O2 via nasal cannula. He was newly diagnosed with DM2 during his stay as well. Patient with MDM capacity and wanting to leave AMA despite risks being clearly explained to him. Verbalized understanding of the risks. D/c on 2 days of cefuroxime and prednisone. Also sent prescription for supplemental iron. Ultimately, family convinced patient to stay. AMA discharge cancelled.      ---  CONSULTANTS:   Cards: Dr. Ad Mart HPI:  69 yo male w/ pmh COPD, HTN, HLD, Hypertriglyceridemia, BRIAN, Prediabetes, Anxiety/Depression, PTSD, GERD, BOOKER presents with weakness x2 weeks. HPI aided by daughter as patient is a poor historian. States he had bronchitis about 1 month ago that was treated outpatient with "two rounds of antibiotics" and steroids. The patient went hunting 2 days ago [facility] and states that he fell asleep multiple times while on the trip. Denies any tick bites within the past month. Pt states that he has had an occasional cough, not increased from his baseline cough with his COPD. Endorses having to receive iron supplementation in the past due to his BRIAN. Pt denies any fever, chills, sob, cp, palpitations, dizziness, arthralgia, vision change, abd pain, n/v/d/c.    ED course  Vitals: T 97.6, HR 71, /94, RR 18, SpO2 99% on RA  Significant labs: WBC 14.44, Neutrohil 83.5%, HgB/Hct 7.8/27.4, AST 69, , ABG: ph 7.40, pCO2 58, pO2 92, HCO3 36, Base Excess, Arterial 11.1, Oxygen Saturation 99.1  Imaging: CT Chest: Findings concerning for multifocal pneumonia, most prominent in the right upper lobe.Slightly enlarged mediastinal and right hilar lymph nodes, nonspecific and possibly reactive.  EKG: pending  In ED patient given: Azithromycin, Ceftriaxone   (20 Nov 2023 13:03)      ---  HOSPITAL COURSE: Patient admitted to medicine floor for management of Multifocal PNA. Treated with IV antibiotics - Rocephin/Azithro. Continued on just rocephin and was to transition to PO abx 11/23. He was not compliant with CPAP in the hospital. Also remains on supplemental O2 via nasal cannula. He was newly diagnosed with DM2 during his stay as well. Noted to be anemic and started on supplemental venofer, as iron studies were consistent with iron deficiency anemia. Patient with MDM capacity and wanting to leave AMA despite risks being clearly explained to him. Verbalized understanding of the risks. D/c on 2 days of cefuroxime and prednisone. Also sent prescription for supplemental iron. Ultimately, family convinced patient to stay. AMA discharge cancelled.      ---  CONSULTANTS:   Cards: Dr. Ad Mart HPI:  67 yo male w/ pmh COPD, HTN, HLD, Hypertriglyceridemia, BRIAN, Prediabetes, Anxiety/Depression, PTSD, GERD, BOOKER presents with weakness x2 weeks. HPI aided by daughter as patient is a poor historian. States he had bronchitis about 1 month ago that was treated outpatient with "two rounds of antibiotics" and steroids. The patient went hunting 2 days ago [facility] and states that he fell asleep multiple times while on the trip. Denies any tick bites within the past month. Pt states that he has had an occasional cough, not increased from his baseline cough with his COPD. Endorses having to receive iron supplementation in the past due to his BRIAN. Pt denies any fever, chills, sob, cp, palpitations, dizziness, arthralgia, vision change, abd pain, n/v/d/c.    ED course  Vitals: T 97.6, HR 71, /94, RR 18, SpO2 99% on RA  Significant labs: WBC 14.44, Neutrohil 83.5%, HgB/Hct 7.8/27.4, AST 69, , ABG: ph 7.40, pCO2 58, pO2 92, HCO3 36, Base Excess, Arterial 11.1, Oxygen Saturation 99.1  Imaging: CT Chest: Findings concerning for multifocal pneumonia, most prominent in the right upper lobe.Slightly enlarged mediastinal and right hilar lymph nodes, nonspecific and possibly reactive.  EKG: pending  In ED patient given: Azithromycin, Ceftriaxone   (20 Nov 2023 13:03)      ---  HOSPITAL COURSE: Patient admitted to medicine floor for management of Multifocal PNA. Treated with IV antibiotics - Rocephin/Azithro. Continued on just rocephin and was to transition to PO abx 11/23. He was not compliant with CPAP in the hospital. Also remains on supplemental O2 via nasal cannula. He was newly diagnosed with DM2 during his stay as well. Noted to be anemic and started on supplemental venofer, as iron studies were consistent with iron deficiency anemia.    ---  CONSULTANTS:   Cards: Dr. Ad Mart HPI:  67 yo male w/ pmh COPD, HTN, HLD, Hypertriglyceridemia, BRIAN, Prediabetes, Anxiety/Depression, PTSD, GERD, BOOKER presents with weakness x2 weeks. HPI aided by daughter as patient is a poor historian. States he had bronchitis about 1 month ago that was treated outpatient with "two rounds of antibiotics" and steroids. The patient went hunting 2 days ago [facility] and states that he fell asleep multiple times while on the trip. Denies any tick bites within the past month. Pt states that he has had an occasional cough, not increased from his baseline cough with his COPD. Endorses having to receive iron supplementation in the past due to his BRIAN. Pt denies any fever, chills, sob, cp, palpitations, dizziness, arthralgia, vision change, abd pain, n/v/d/c.    ED course  Vitals: T 97.6, HR 71, /94, RR 18, SpO2 99% on RA  Significant labs: WBC 14.44, Neutrohil 83.5%, HgB/Hct 7.8/27.4, AST 69, , ABG: ph 7.40, pCO2 58, pO2 92, HCO3 36, Base Excess, Arterial 11.1, Oxygen Saturation 99.1  Imaging: CT Chest: Findings concerning for multifocal pneumonia, most prominent in the right upper lobe.Slightly enlarged mediastinal and right hilar lymph nodes, nonspecific and possibly reactive.  EKG: pending  In ED patient given: Azithromycin, Ceftriaxone   (20 Nov 2023 13:03)      ---  HOSPITAL COURSE: Patient admitted to medicine floor for management of Multifocal PNA. Treated with IV antibiotics - Rocephin/Azithro. Continued on just Rocephin and finished course. He was not compliant with CPAP in the hospital. Also remains on supplemental O2 via nasal cannula. He was newly diagnosed with DM2 during his stay as well. Noted to be anemic and started on supplemental venofer, as iron studies were consistent with iron deficiency anemia. Patient completed course of venofer and was transfused with 1 unit pRBC.  Patient was noted to have new onset afib was started on metoprolol and full anticoagulation. Patient converted back to sinus rhythm on 11/25.    Vitals  Vital Signs Last 24 Hrs  T(C): 36.6 (25 Nov 2023 11:40), Max: 36.8 (25 Nov 2023 00:10)  T(F): 97.9 (25 Nov 2023 11:40), Max: 98.3 (25 Nov 2023 00:10)  HR: 63 (25 Nov 2023 11:40) (63 - 101)  BP: 106/59 (25 Nov 2023 11:40) (98/57 - 133/68)  BP(mean): --  RR: 19 (25 Nov 2023 11:40) (18 - 19)  SpO2: 94% (25 Nov 2023 11:40) (92% - 98%)    Parameters below as of 25 Nov 2023 11:40  Patient On (Oxygen Delivery Method): room air    Physical exam:  PHYSICAL EXAM:  GENERAL: NAD at rest, obese  HEENT:  anicteric, moist mucous membranes  CHEST/LUNG:  decreased breath sounds; no rales, wheezes, or rhonchi appreciated  HEART:  irregular at 128bpm, S1, S2  ABDOMEN:  BS+, soft, nontender, nondistended  EXTREMITIES: no edema, cyanosis, or calf tenderness  NERVOUS SYSTEM: answers questions and follows commands appropriately      ---  CONSULTANTS:   Cards: Dr. Ad Mart HPI:  69 yo male w/ pmh COPD, HTN, HLD, Hypertriglyceridemia, BRIAN, Prediabetes, Anxiety/Depression, PTSD, GERD, BOOKER presents with weakness x2 weeks. HPI aided by daughter as patient is a poor historian. States he had bronchitis about 1 month ago that was treated outpatient with "two rounds of antibiotics" and steroids. The patient went hunting 2 days ago [facility] and states that he fell asleep multiple times while on the trip. Denies any tick bites within the past month. Pt states that he has had an occasional cough, not increased from his baseline cough with his COPD. Endorses having to receive iron supplementation in the past due to his BRIAN. Pt denies any fever, chills, sob, cp, palpitations, dizziness, arthralgia, vision change, abd pain, n/v/d/c.    ED course  Vitals: T 97.6, HR 71, /94, RR 18, SpO2 99% on RA  Significant labs: WBC 14.44, Neutrohil 83.5%, HgB/Hct 7.8/27.4, AST 69, , ABG: ph 7.40, pCO2 58, pO2 92, HCO3 36, Base Excess, Arterial 11.1, Oxygen Saturation 99.1  Imaging: CT Chest: Findings concerning for multifocal pneumonia, most prominent in the right upper lobe.Slightly enlarged mediastinal and right hilar lymph nodes, nonspecific and possibly reactive.  EKG: pending  In ED patient given: Azithromycin, Ceftriaxone   (20 Nov 2023 13:03)      ---  HOSPITAL COURSE: Patient admitted to medicine floor for management of Multifocal PNA. Treated with IV antibiotics - Rocephin/Azithro, completed course in the hospital. He was not compliant with CPAP in the hospital. Pt had acute hypoxic respiratory failure due to PNA that resolved by day of discharge. He was newly diagnosed with DM2 during his stay as well with Hgb A1c of 6.5%. Pt also had symptomatic anemia with Hgb ~7.4 due to severe iron deficiency and was given 1un PRBC and started on supplemental venofer, as iron studies were consistent with iron deficiency anemia. Patient completed course of venofer and was transfused with 1 unit pRBC.  Patient was noted to have new onset afib was started on metoprolol and full anticoagulation. Patient converted back to sinus rhythm on 11/25.    Vitals  Vital Signs Last 24 Hrs  T(C): 36.6 (25 Nov 2023 11:40), Max: 36.8 (25 Nov 2023 00:10)  T(F): 97.9 (25 Nov 2023 11:40), Max: 98.3 (25 Nov 2023 00:10)  HR: 63 (25 Nov 2023 11:40) (63 - 101)  BP: 106/59 (25 Nov 2023 11:40) (98/57 - 133/68)  BP(mean): --  RR: 19 (25 Nov 2023 11:40) (18 - 19)  SpO2: 94% (25 Nov 2023 11:40) (92% - 98%)    Parameters below as of 25 Nov 2023 11:40  Patient On (Oxygen Delivery Method): room air    Physical exam:  PHYSICAL EXAM:  GENERAL: NAD at rest, obese  HEENT:  anicteric, moist mucous membranes  CHEST/LUNG:  decreased breath sounds; no rales, wheezes, or rhonchi appreciated  HEART:  rrr, S1, S2  ABDOMEN:  BS+, soft, nontender, nondistended  EXTREMITIES: no edema, cyanosis, or calf tenderness  NERVOUS SYSTEM: answers questions and follows commands appropriately      ---  CONSULTANTS:   Cards: Ad Mart    Time spent: 50min

## 2023-11-21 NOTE — PROGRESS NOTE ADULT - PROBLEM SELECTOR PLAN 8
States he has been prediabetic for many years, not on medication  -f/u HgBA1c States he has been prediabetic for many years, not on medication  -f/u HBA1c

## 2023-11-21 NOTE — PROGRESS NOTE ADULT - SUBJECTIVE AND OBJECTIVE BOX
[facility] DIVISION of INFECTIOUS DISEASE  Camden Mart MD PhD, Janeen Mckeon MD, Cammie Gomez MD, Jody Nur MD, Andres Smith MD  and providing coverage with Morteza Otero MD  Providing Infectious Disease Consultations at [facility], [facility], [facility]'s    Office# [phone] to schedule follow up appointments  Answering Service for urgent calls or New Consults [phone]  Cell# to text for urgent issues Camden Mart [phone]     infectious diseases progress note:    ELTON IVERSON is a 68y y. o. Male patient    Overnight and events of the last 24hrs reviewed    Allergies    No Known Drug Allergies  latex (Rash)    Intolerances        ANTIBIOTICS/RELEVANT:  antimicrobials  azithromycin  IVPB 500 milliGRAM(s) IV Intermittent every 24 hours  cefTRIAXone   IVPB 1000 milliGRAM(s) IV Intermittent every 24 hours    immunologic:    OTHER:  acetaminophen     Tablet .. 650 milliGRAM(s) Oral every 6 hours PRN  albuterol    90 MICROgram(s) HFA Inhaler 2 Puff(s) Inhalation every 6 hours PRN  albuterol/ipratropium for Nebulization 3 milliLiter(s) Nebulizer every 6 hours  aluminum hydroxide/magnesium hydroxide/simethicone Suspension 30 milliLiter(s) Oral every 4 hours PRN  amLODIPine   Tablet 5 milliGRAM(s) Oral daily  ARIPiprazole 20 milliGRAM(s) Oral daily  atorvastatin 20 milliGRAM(s) Oral at bedtime  budesonide 160 MICROgram(s)/formoterol 4.5 MICROgram(s) Inhaler 2 Puff(s) Inhalation two times a day  buPROPion XL (24-Hour) . 300 milliGRAM(s) Oral daily  famotidine    Tablet 20 milliGRAM(s) Oral two times a day  guaiFENesin Oral Liquid (Sugar-Free) 200 milliGRAM(s) Oral every 6 hours PRN  LORazepam     Tablet 1 milliGRAM(s) Oral <User Schedule>  LORazepam     Tablet 1.5 milliGRAM(s) Oral <User Schedule>  melatonin 3 milliGRAM(s) Oral at bedtime PRN  ondansetron Injectable 4 milliGRAM(s) IV Push every 8 hours PRN  pantoprazole    Tablet 40 milliGRAM(s) Oral every 12 hours  predniSONE   Tablet 20 milliGRAM(s) Oral daily  sertraline 200 milliGRAM(s) Oral daily  traZODone 150 milliGRAM(s) Oral at bedtime      Objective:  Vital Signs Last 24 Hrs  T(C): 36.7 (21 Nov 2023 05:28), Max: 36.7 (20 Nov 2023 20:44)  T(F): 98 (21 Nov 2023 05:28), Max: 98.1 (20 Nov 2023 20:44)  HR: 92 (21 Nov 2023 08:57) (81 - 94)  BP: 164/75 (21 Nov 2023 05:28) (142/61 - 164/75)  BP(mean): --  RR: 18 (21 Nov 2023 05:28) (18 - 18)  SpO2: 94% (21 Nov 2023 08:57) (94% - 97%)    Parameters below as of 21 Nov 2023 08:57  Patient On (Oxygen Delivery Method): nasal cannula, 3L        T(C): 36.7 (11-21-23 @ 05:28), Max: 36.7 (11-20-23 @ 08:16)  T(C): 36.7 (11-21-23 @ 05:28), Max: 36.7 (11-20-23 @ 08:16)  T(C): 36.7 (11-21-23 @ 05:28), Max: 36.7 (11-20-23 @ 08:16)    PHYSICAL EXAM:  HEENT: NC atraumatic  Neck: supple  Respiratory: no accessory muscle use, breathing comfortably, crackles RLL  Cardiovascular: distant  Gastrointestinal: normal appearing, nondistended  Extremities: no clubbing, no cyanosis,        LABS:                          7.1    11.64 )-----------( 238      ( 21 Nov 2023 06:40 )             23.8       WBC  11.64 11-21 @ 06:40  14.44 11-20 @ 09:20      11-21    140  |  105  |  20  ----------------------------<  127<H>  4.0   |  33<H>  |  0.76    Ca    8.0<L>      21 Nov 2023 06:40    TPro  6.7  /  Alb  3.1<L>  /  TBili  0.5  /  DBili  0.2  /  AST  51<H>  /  ALT  89<H>  /  AlkPhos  86  11-21      Creatinine: 0.76 mg/dL (11-21-23 @ 06:40)  Creatinine: 1.00 mg/dL (11-20-23 @ 09:20)      PT/INR - ( 21 Nov 2023 06:40 )   PT: 13.4 sec;   INR: 1.15 ratio         PTT - ( 21 Nov 2023 06:40 )  PTT:29.3 sec  Urinalysis Basic - ( 21 Nov 2023 06:40 )    Color: x / Appearance: x / SG: x / pH: x  Gluc: 127 mg/dL / Ketone: x  / Bili: x / Urobili: x   Blood: x / Protein: x / Nitrite: x   Leuk Esterase: x / RBC: x / WBC x   Sq Epi: x / Non Sq Epi: x / Bacteria: x            INFLAMMATORY MARKERS      MICROBIOLOGY:              RADIOLOGY & ADDITIONAL STUDIES:

## 2023-11-21 NOTE — PATIENT PROFILE ADULT - FALL HARM RISK - HARM RISK INTERVENTIONS
Assistance with ambulation/Assistance OOB with selected safe patient handling equipment/Communicate Risk of Fall with Harm to all staff/Reinforce activity limits and safety measures with patient and family/Tailored Fall Risk Interventions/Use of alarms - bed, chair and/or voice tab/Visual Cue: Yellow wristband and red socks/Bed in lowest position, wheels locked, appropriate side rails in place/Call bell, personal items and telephone in reach/Instruct patient to call for assistance before getting out of bed or chair/Non-slip footwear when patient is out of bed/[place] to call system/Physically safe environment - no spills, clutter or unnecessary equipment/Purposeful Proactive Rounding/Room/bathroom lighting operational, light cord in reach

## 2023-11-21 NOTE — PROGRESS NOTE ADULT - PROBLEM SELECTOR PLAN 9
Continue nexium and famotidine BID -Pt advised to get f/u chest  imaging 4-6 weeks from discharge for resolution of pna to be arranged by PCP. Lymphadenopathy possible reactive due to infection

## 2023-11-21 NOTE — DISCHARGE NOTE PROVIDER - NSDCCPCAREPLAN_GEN_ALL_CORE_FT
PRINCIPAL DISCHARGE DIAGNOSIS  Diagnosis: Multifocal pneumonia  Assessment and Plan of Treatment: You were treated with IV antibiotics. Complete course of antibiotics with cefuroxime 500mg twice a day for 2 more days. You are electing to leave against medical advice. You are still requiring oxygen in the hospital. Risks were explained to you. Please return to ER for any change in symptoms.      SECONDARY DISCHARGE DIAGNOSES  Diagnosis: Anemia  Assessment and Plan of Treatment: You have Iron deficiency anemia. You are leaving against medical advice before the full work up was done. Continue iron supplements.    Diagnosis: Chronic obstructive pulmonary disease (COPD)  Assessment and Plan of Treatment: With acute hypoxic respiratory failure requiring supplemental oxygen. Continue home inhalers. Take 2 more days of prednisone as prescribed. Please establish care with a pulmonologist.    Diagnosis: BOOKER (obstructive sleep apnea)  Assessment and Plan of Treatment: You have not been using CPAP as recommended. Please follow up with your PCP and a pulmonologist.    Diagnosis: DM2 (diabetes mellitus, type 2)  Assessment and Plan of Treatment: You are newly diagnosed with type 2 diabetes. A1C was 6.5. Please start diet and lifestyle modifications as discussed with diabetic NP. You can follow up with endocrine Dr. Perlman.     PRINCIPAL DISCHARGE DIAGNOSIS  Diagnosis: Multifocal pneumonia  Assessment and Plan of Treatment: You were treated with IV antibiotics.      SECONDARY DISCHARGE DIAGNOSES  Diagnosis: Anemia  Assessment and Plan of Treatment: You have Iron deficiency anemia. Continue iron supplements.    Diagnosis: Chronic obstructive pulmonary disease (COPD)  Assessment and Plan of Treatment: With acute hypoxic respiratory failure requiring supplemental oxygen. You were treated with oxygen, nebulizers and steroids. Continue home inhalers. Please establish care with a pulmonologist.    Diagnosis: BOOKER (obstructive sleep apnea)  Assessment and Plan of Treatment: You have not been using CPAP as recommended. Please follow up with your PCP and a pulmonologist.    Diagnosis: DM2 (diabetes mellitus, type 2)  Assessment and Plan of Treatment: You are newly diagnosed with type 2 diabetes. A1C was 6.5. Please start diet and lifestyle modifications as discussed with diabetic NP. You can follow up with endocrine Dr. Perlman.     PRINCIPAL DISCHARGE DIAGNOSIS  Diagnosis: Multifocal pneumonia  Assessment and Plan of Treatment: You were treated with IV antibiotics and were able to finish your course.      SECONDARY DISCHARGE DIAGNOSES  Diagnosis: Anemia  Assessment and Plan of Treatment: You have Iron deficiency anemia. Continue iron supplements.    Diagnosis: Afib  Assessment and Plan of Treatment: You have a known diagnosis of atrial fibrillation prior to your admission. This condition, if not treated, increases your risk of stroke or heart attack. Please continue to take metoprolol and ____.Please make sure to continue taking these medications to avoid developing blood clots and to lower your risk of stroke. Additionally, please follow up with your PCP (and/or cardiologist) on a regular basis to ensure that this condition does not require changes to the dose or type of medication.      Diagnosis: Chronic obstructive pulmonary disease (COPD)  Assessment and Plan of Treatment: With acute hypoxic respiratory failure requiring supplemental oxygen. You were treated with oxygen, nebulizers and steroids. Continue home inhalers. Please establish care with a pulmonologist.    Diagnosis: BOOKER (obstructive sleep apnea)  Assessment and Plan of Treatment: You have not been using CPAP as recommended. Please follow up with your PCP and a pulmonologist.    Diagnosis: DM2 (diabetes mellitus, type 2)  Assessment and Plan of Treatment: You are newly diagnosed with type 2 diabetes. A1C was 6.5. Please start diet and lifestyle modifications as discussed with diabetic NP. You can follow up with endocrine Dr. Perlman.     PRINCIPAL DISCHARGE DIAGNOSIS  Diagnosis: Multifocal pneumonia  Assessment and Plan of Treatment: You were treated with IV antibiotics and completed the course in the hospital. You have improved significantly and no longer need supplemental oxygen. Please follow up with your PCP and pulmonologist after discharge.      SECONDARY DISCHARGE DIAGNOSES  Diagnosis: Afib  Assessment and Plan of Treatment: You had a newly diagnosed arrhythmia called atrial fibrillation. When you were in atrial fibrillation, your heart rate was very fast and were given medication to control your heart rate. This condition, if not treated, increases your risk of stroke or cardioembolic events.   You spontaneously converted back to normal sinus rhythm; however, this is a condition that might come and go every few days or even weeks without you realizing.   Please continue to take the newly prescribed metoprolol and start taking a blood thinner called apixaban (aka Eliquis).   Follow up with cardiologist, Dr. San (number below), or one of his partners to set you up for monitoring device to monitor if you are having relapses into atrial fibrillation. That would determine whether you need to continue taking a blood thinner in the future.  Monitor your stool while on blood thinners, red blood or black stool might indicate blood in the stool.   Please start taking the iron supplement ferrous sulfate after one week. Before starting it, monitor your stool for the week. If you have black or bloody stool, come back to the ER. Recheck your hemoglobin with your PCP or hematologist in 1 week and if stable and you have no sign of bleeding on the new blood thinner, then you can start taking the iron supplement. Of note, the iron supplement can make your stool very dark or black in color.    Diagnosis: Anemia  Assessment and Plan of Treatment: You have severe iron deficiency anemia. You were given 1 unit of packed red blood cells and received three doses of intravenous iron. Please start taking the iron supplement ferrous sulfate after one week. Before starting it, monitor your stool for the week. If you have black or bloody stool, come back to the ER. Recheck your hemoglobin with your PCP or hematologist in 1 week and if stable and you have no sign of bleeding on the new blood thinner, then you can start taking the iron supplement. Of note, the iron supplement can make your stool very dark or black in color. It can also cause constipation. If you have constipation, take an over the counter stool softener.   Follow up with your hematologist to monitor your iron levels and you may need periodic iron infusions.   It is possible that your nexium is making it difficult for you to absorb oral iron well. Discuss with your gastroenterologist whether you can go down on the dose and also discuss if you need to have repeat endoscopy in the near future.   Your hemoglobin on day of discharge is 8.8.    Diagnosis: Chronic obstructive pulmonary disease (COPD)  Assessment and Plan of Treatment: You were treated with oxygen, nebulizers and steroids and have improved. Continue your home regimen of inhalers. Please follow up with pulmonologist.    Diagnosis: DM2 (diabetes mellitus, type 2)  Assessment and Plan of Treatment: You are newly diagnosed with type 2 diabetes with a hemoglobin A1C of 6.5% which is right on the border of making that diagnosis. Please start diet and lifestyle modifications and avoid carbohydrates. Follow up with your PCP to recheck in 3 months to decide if you need to start any medication.    Diagnosis: BOOKER (obstructive sleep apnea)  Assessment and Plan of Treatment: Please follow up with your PCP and pulmonologist to find possible treatments for your sleep apnea that you can tolerate. It is important to manage BOOKER as it can lead to other chronic medical conditions if left untreated for a long time.    Diagnosis: HTN (hypertension)  Assessment and Plan of Treatment: Your medication were adjusted. Please STOP taking amlodipine. Please start taking the prescribed losartan and metoprolol instead.   Monitor your blood pressure at home and make a daily log of values to show your PCP. Discuss your results with your PCP at your next appointment in ~ a week and they can guide you if you need to make a change in your new regimen.

## 2023-11-21 NOTE — PROGRESS NOTE ADULT - SUBJECTIVE AND OBJECTIVE BOX
Patient is a 68y old  Male who presents with a chief complaint of Pneumonia (21 Nov 2023 05:45)      INTERVAL HPI/OVERNIGHT EVENTS: Pt was seen and examined at bedside. No acute overnight events. Pt states that he is still SOB has an intermittent cough with clear phelgm.  Pt denies any pain, headache, dizziness, lightheadedness, fever, chills, body aches, CP, palpitations, abdominal pain, n/v, numbness/tingling.  No other complaints at this time.     MEDICATIONS  (STANDING):  albuterol/ipratropium for Nebulization 3 milliLiter(s) Nebulizer every 6 hours  amLODIPine   Tablet 5 milliGRAM(s) Oral daily  ARIPiprazole 20 milliGRAM(s) Oral daily  atorvastatin 20 milliGRAM(s) Oral at bedtime  azithromycin  IVPB 500 milliGRAM(s) IV Intermittent every 24 hours  budesonide 160 MICROgram(s)/formoterol 4.5 MICROgram(s) Inhaler 2 Puff(s) Inhalation two times a day  buPROPion XL (24-Hour) . 300 milliGRAM(s) Oral daily  cefTRIAXone   IVPB 1000 milliGRAM(s) IV Intermittent every 24 hours  famotidine    Tablet 20 milliGRAM(s) Oral two times a day  LORazepam     Tablet 1 milliGRAM(s) Oral <User Schedule>  LORazepam     Tablet 1.5 milliGRAM(s) Oral <User Schedule>  pantoprazole    Tablet 40 milliGRAM(s) Oral every 12 hours  predniSONE   Tablet 20 milliGRAM(s) Oral daily  sertraline 200 milliGRAM(s) Oral daily  traZODone 150 milliGRAM(s) Oral at bedtime    MEDICATIONS  (PRN):  acetaminophen     Tablet .. 650 milliGRAM(s) Oral every 6 hours PRN Temp greater or equal to 38C (100.4F), Mild Pain (1 - 3)  albuterol    90 MICROgram(s) HFA Inhaler 2 Puff(s) Inhalation every 6 hours PRN for bronchospasm  aluminum hydroxide/magnesium hydroxide/simethicone Suspension 30 milliLiter(s) Oral every 4 hours PRN Dyspepsia  guaiFENesin Oral Liquid (Sugar-Free) 200 milliGRAM(s) Oral every 6 hours PRN Cough  melatonin 3 milliGRAM(s) Oral at bedtime PRN Insomnia  ondansetron Injectable 4 milliGRAM(s) IV Push every 8 hours PRN Nausea and/or Vomiting      Allergies    No Known Drug Allergies  latex (Rash)    Intolerances        REVIEW OF SYSTEMS:  CONSTITUTIONAL: No fever or chills  HEENT:  No headache, no sore throat  RESPIRATORY: No cough, wheezing, or shortness of breath  CARDIOVASCULAR: No chest pain, palpitations  GASTROINTESTINAL: No abd pain, nausea, vomiting, or diarrhea  GENITOURINARY: No dysuria, frequency, or hematuria  NEUROLOGICAL: no focal weakness or dizziness  MUSCULOSKELETAL: no myalgias     Vital Signs Last 24 Hrs  T(C): 36.7 (21 Nov 2023 05:28), Max: 36.7 (20 Nov 2023 20:44)  T(F): 98 (21 Nov 2023 05:28), Max: 98.1 (20 Nov 2023 20:44)  HR: 94 (21 Nov 2023 05:28) (71 - 94)  BP: 164/75 (21 Nov 2023 05:28) (142/61 - 164/75)  BP(mean): --  RR: 18 (21 Nov 2023 05:28) (18 - 18)  SpO2: 95% (21 Nov 2023 05:28) (95% - 99%)    Parameters below as of 21 Nov 2023 05:28  Patient On (Oxygen Delivery Method): nasal cannula  O2 Flow (L/min): 3      PHYSICAL EXAM:  GENERAL: NAD  HEENT:  anicteric, moist mucous membranes  CHEST/LUNG:  CTA b/l, no rales, wheezes, or rhonchi  HEART:  RRR, S1, S2  ABDOMEN:  BS+, soft, nontender, nondistended  EXTREMITIES: no edema, cyanosis, or calf tenderness  NERVOUS SYSTEM: answers questions and follows commands appropriately    LABS:                        7.1    11.64 )-----------( 238      ( 21 Nov 2023 06:40 )             23.8     CBC Full  -  ( 21 Nov 2023 06:40 )  WBC Count : 11.64 K/uL  Hemoglobin : 7.1 g/dL  Hematocrit : 23.8 %  Platelet Count - Automated : 238 K/uL  Mean Cell Volume : 76.0 fl  Mean Cell Hemoglobin : 22.7 pg  Mean Cell Hemoglobin Concentration : 29.8 gm/dL  Auto Neutrophil # : 9.33 K/uL  Auto Lymphocyte # : 0.83 K/uL  Auto Monocyte # : 0.98 K/uL  Auto Eosinophil # : 0.39 K/uL  Auto Basophil # : 0.04 K/uL  Auto Neutrophil % : 80.2 %  Auto Lymphocyte % : 7.1 %  Auto Monocyte % : 8.4 %  Auto Eosinophil % : 3.4 %  Auto Basophil % : 0.3 %    21 Nov 2023 06:40    140    |  105    |  20     ----------------------------<  127    4.0     |  33     |  0.76     Ca    8.0        21 Nov 2023 06:40    TPro  6.7    /  Alb  3.1    /  TBili  0.5    /  DBili  0.2    /  AST  51     /  ALT  89     /  AlkPhos  86     21 Nov 2023 06:40    PT/INR - ( 21 Nov 2023 06:40 )   PT: 13.4 sec;   INR: 1.15 ratio         PTT - ( 21 Nov 2023 06:40 )  PTT:29.3 sec  Urinalysis Basic - ( 21 Nov 2023 06:40 )    Color: x / Appearance: x / SG: x / pH: x  Gluc: 127 mg/dL / Ketone: x  / Bili: x / Urobili: x   Blood: x / Protein: x / Nitrite: x   Leuk Esterase: x / RBC: x / WBC x   Sq Epi: x / Non Sq Epi: x / Bacteria: x      CAPILLARY BLOOD GLUCOSE      POCT Blood Glucose.: 126 mg/dL (20 Nov 2023 08:40)          RADIOLOGY & ADDITIONAL TESTS: ____    Personally reviewed.     Consultant(s) Notes Reviewed:  [x] YES  [ ] NO     Patient is a 68y old  Male who presents with a chief complaint of Pneumonia (21 Nov 2023 05:45)      INTERVAL HPI/OVERNIGHT EVENTS: Pt was seen and examined at bedside. No acute overnight events. Pt states that he is still SOB has an intermittent cough with clear phlegm    Pt denies any pain, headache, dizziness, lightheadedness, fever, chills, body aches, CP, palpitations, abdominal pain, n/v, numbness/tingling.  No other complaints at this time.     MEDICATIONS  (STANDING):  albuterol/ipratropium for Nebulization 3 milliLiter(s) Nebulizer every 6 hours  amLODIPine   Tablet 5 milliGRAM(s) Oral daily  ARIPiprazole 20 milliGRAM(s) Oral daily  atorvastatin 20 milliGRAM(s) Oral at bedtime  azithromycin  IVPB 500 milliGRAM(s) IV Intermittent every 24 hours  budesonide 160 MICROgram(s)/formoterol 4.5 MICROgram(s) Inhaler 2 Puff(s) Inhalation two times a day  buPROPion XL (24-Hour) . 300 milliGRAM(s) Oral daily  cefTRIAXone   IVPB 1000 milliGRAM(s) IV Intermittent every 24 hours  famotidine    Tablet 20 milliGRAM(s) Oral two times a day  LORazepam     Tablet 1 milliGRAM(s) Oral <User Schedule>  LORazepam     Tablet 1.5 milliGRAM(s) Oral <User Schedule>  pantoprazole    Tablet 40 milliGRAM(s) Oral every 12 hours  predniSONE   Tablet 20 milliGRAM(s) Oral daily  sertraline 200 milliGRAM(s) Oral daily  traZODone 150 milliGRAM(s) Oral at bedtime    MEDICATIONS  (PRN):  acetaminophen     Tablet .. 650 milliGRAM(s) Oral every 6 hours PRN Temp greater or equal to 38C (100.4F), Mild Pain (1 - 3)  albuterol    90 MICROgram(s) HFA Inhaler 2 Puff(s) Inhalation every 6 hours PRN for bronchospasm  aluminum hydroxide/magnesium hydroxide/simethicone Suspension 30 milliLiter(s) Oral every 4 hours PRN Dyspepsia  guaiFENesin Oral Liquid (Sugar-Free) 200 milliGRAM(s) Oral every 6 hours PRN Cough  melatonin 3 milliGRAM(s) Oral at bedtime PRN Insomnia  ondansetron Injectable 4 milliGRAM(s) IV Push every 8 hours PRN Nausea and/or Vomiting      Allergies    No Known Drug Allergies  latex (Rash)    Intolerances        REVIEW OF SYSTEMS:  CONSTITUTIONAL: No fever or chills  HEENT:  No headache, no sore throat  RESPIRATORY: No cough, wheezing, or shortness of breath  CARDIOVASCULAR: No chest pain, palpitations  GASTROINTESTINAL: No abd pain, nausea, vomiting, or diarrhea  GENITOURINARY: No dysuria, frequency, or hematuria  NEUROLOGICAL: no focal weakness or dizziness  MUSCULOSKELETAL: no myalgias     Vital Signs Last 24 Hrs  T(C): 36.7 (21 Nov 2023 05:28), Max: 36.7 (20 Nov 2023 20:44)  T(F): 98 (21 Nov 2023 05:28), Max: 98.1 (20 Nov 2023 20:44)  HR: 94 (21 Nov 2023 05:28) (71 - 94)  BP: 164/75 (21 Nov 2023 05:28) (142/61 - 164/75)  BP(mean): --  RR: 18 (21 Nov 2023 05:28) (18 - 18)  SpO2: 95% (21 Nov 2023 05:28) (95% - 99%)    Parameters below as of 21 Nov 2023 05:28  Patient On (Oxygen Delivery Method): nasal cannula  O2 Flow (L/min): 3      PHYSICAL EXAM:  GENERAL: NAD  HEENT:  anicteric, moist mucous membranes  CHEST/LUNG:  CTA b/l, +rhonchi in right upper lobe  HEART:  RRR, S1, S2  ABDOMEN:  BS+, soft, nontender, nondistended  EXTREMITIES: no edema, cyanosis, or calf tenderness  NERVOUS SYSTEM: answers questions and follows commands appropriately    LABS:                        7.1    11.64 )-----------( 238      ( 21 Nov 2023 06:40 )             23.8     CBC Full  -  ( 21 Nov 2023 06:40 )  WBC Count : 11.64 K/uL  Hemoglobin : 7.1 g/dL  Hematocrit : 23.8 %  Platelet Count - Automated : 238 K/uL  Mean Cell Volume : 76.0 fl  Mean Cell Hemoglobin : 22.7 pg  Mean Cell Hemoglobin Concentration : 29.8 gm/dL  Auto Neutrophil # : 9.33 K/uL  Auto Lymphocyte # : 0.83 K/uL  Auto Monocyte # : 0.98 K/uL  Auto Eosinophil # : 0.39 K/uL  Auto Basophil # : 0.04 K/uL  Auto Neutrophil % : 80.2 %  Auto Lymphocyte % : 7.1 %  Auto Monocyte % : 8.4 %  Auto Eosinophil % : 3.4 %  Auto Basophil % : 0.3 %    21 Nov 2023 06:40    140    |  105    |  20     ----------------------------<  127    4.0     |  33     |  0.76     Ca    8.0        21 Nov 2023 06:40    TPro  6.7    /  Alb  3.1    /  TBili  0.5    /  DBili  0.2    /  AST  51     /  ALT  89     /  AlkPhos  86     21 Nov 2023 06:40    PT/INR - ( 21 Nov 2023 06:40 )   PT: 13.4 sec;   INR: 1.15 ratio         PTT - ( 21 Nov 2023 06:40 )  PTT:29.3 sec  Urinalysis Basic - ( 21 Nov 2023 06:40 )    Color: x / Appearance: x / SG: x / pH: x  Gluc: 127 mg/dL / Ketone: x  / Bili: x / Urobili: x   Blood: x / Protein: x / Nitrite: x   Leuk Esterase: x / RBC: x / WBC x   Sq Epi: x / Non Sq Epi: x / Bacteria: x      CAPILLARY BLOOD GLUCOSE      POCT Blood Glucose.: 126 mg/dL (20 Nov 2023 08:40)          RADIOLOGY & ADDITIONAL TESTS: ____    Personally reviewed.     Consultant(s) Notes Reviewed:  [x] YES  [ ] NO     Patient is a 68y old  Male who presents with a chief complaint of Pneumonia (21 Nov 2023 05:45)      INTERVAL HPI/OVERNIGHT EVENTS: Pt was seen and examined at bedside. No acute overnight events. Pt states that he is still SOB has an intermittent cough with clear phlegm    Pt denies any pain, headache, dizziness, lightheadedness, fever, chills, body aches, CP, palpitations, abdominal pain, n/v, numbness/tingling.  No other complaints at this time.       pulse ox 3lit nc 95         REVIEW OF SYSTEMS:  CONSTITUTIONAL: No fever or chills  HEENT:  No headache, no sore throat  RESPIRATORY: No cough, wheezing, or shortness of breath  CARDIOVASCULAR: No chest pain, palpitations  GASTROINTESTINAL: No abd pain, nausea, vomiting, or diarrhea  GENITOURINARY: No dysuria, frequency, or hematuria  NEUROLOGICAL: no focal weakness or dizziness  MUSCULOSKELETAL: no myalgias     Vital Signs Last 24 Hrs  T(C): 36.7 (21 Nov 2023 05:28), Max: 36.7 (20 Nov 2023 20:44)  T(F): 98 (21 Nov 2023 05:28), Max: 98.1 (20 Nov 2023 20:44)  HR: 94 (21 Nov 2023 05:28) (71 - 94)  BP: 164/75 (21 Nov 2023 05:28) (142/61 - 164/75)  BP(mean): --  RR: 18 (21 Nov 2023 05:28) (18 - 18)  SpO2: 95% (21 Nov 2023 05:28) (95% - 99%)    Parameters below as of 21 Nov 2023 05:28  Patient On (Oxygen Delivery Method): nasal cannula  O2 Flow (L/min): 3      PHYSICAL EXAM:  GENERAL: NAD  HEENT:  anicteric, moist mucous membranes  CHEST/LUNG:  CTA b/l, +rhonchi in right upper lobe  HEART:  RRR, S1, S2 , no tachy   ABDOMEN:  BS+, soft, nontender, nondistended  EXTREMITIES: no edema, cyanosis, or calf tenderness  NERVOUS SYSTEM: aa0/3 sensory / motor intact   gu intact      MEDICATIONS  (STANDING):  albuterol/ipratropium for Nebulization 3 milliLiter(s) Nebulizer every 6 hours  amLODIPine   Tablet 5 milliGRAM(s) Oral daily  ARIPiprazole 20 milliGRAM(s) Oral daily  atorvastatin 20 milliGRAM(s) Oral at bedtime  azithromycin  IVPB 500 milliGRAM(s) IV Intermittent every 24 hours  budesonide 160 MICROgram(s)/formoterol 4.5 MICROgram(s) Inhaler 2 Puff(s) Inhalation two times a day  buPROPion XL (24-Hour) . 300 milliGRAM(s) Oral daily  cefTRIAXone   IVPB 1000 milliGRAM(s) IV Intermittent every 24 hours  famotidine    Tablet 20 milliGRAM(s) Oral two times a day  LORazepam     Tablet 1 milliGRAM(s) Oral <User Schedule>  LORazepam     Tablet 1.5 milliGRAM(s) Oral <User Schedule>  pantoprazole    Tablet 40 milliGRAM(s) Oral every 12 hours  predniSONE   Tablet 20 milliGRAM(s) Oral daily  sertraline 200 milliGRAM(s) Oral daily  traZODone 150 milliGRAM(s) Oral at bedtime    MEDICATIONS  (PRN):  acetaminophen     Tablet .. 650 milliGRAM(s) Oral every 6 hours PRN Temp greater or equal to 38C (100.4F), Mild Pain (1 - 3)  albuterol    90 MICROgram(s) HFA Inhaler 2 Puff(s) Inhalation every 6 hours PRN for bronchospasm  aluminum hydroxide/magnesium hydroxide/simethicone Suspension 30 milliLiter(s) Oral every 4 hours PRN Dyspepsia  guaiFENesin Oral Liquid (Sugar-Free) 200 milliGRAM(s) Oral every 6 hours PRN Cough  melatonin 3 milliGRAM(s) Oral at bedtime PRN Insomnia  ondansetron Injectable 4 milliGRAM(s) IV Push every 8 hours PRN Nausea and/or Vomiting        LABS:                        7.1    11.64 )-----------( 238      ( 21 Nov 2023 06:40 )             23.8     CBC Full  -  ( 21 Nov 2023 06:40 )  WBC Count : 11.64 K/uL  Hemoglobin : 7.1 g/dL  Hematocrit : 23.8 %  Platelet Count - Automated : 238 K/uL  Mean Cell Volume : 76.0 fl  Mean Cell Hemoglobin : 22.7 pg  Mean Cell Hemoglobin Concentration : 29.8 gm/dL  Auto Neutrophil # : 9.33 K/uL  Auto Lymphocyte # : 0.83 K/uL  Auto Monocyte # : 0.98 K/uL  Auto Eosinophil # : 0.39 K/uL  Auto Basophil # : 0.04 K/uL  Auto Neutrophil % : 80.2 %  Auto Lymphocyte % : 7.1 %  Auto Monocyte % : 8.4 %  Auto Eosinophil % : 3.4 %  Auto Basophil % : 0.3 %    21 Nov 2023 06:40    140    |  105    |  20     ----------------------------<  127    4.0     |  33     |  0.76     Ca    8.0        21 Nov 2023 06:40    TPro  6.7    /  Alb  3.1    /  TBili  0.5    /  DBili  0.2    /  AST  51     /  ALT  89     /  AlkPhos  86     21 Nov 2023 06:40    PT/INR - ( 21 Nov 2023 06:40 )   PT: 13.4 sec;   INR: 1.15 ratio         PTT - ( 21 Nov 2023 06:40 )  PTT:29.3 sec  Urinalysis Basic - ( 21 Nov 2023 06:40 )    Color: x / Appearance: x / SG: x / pH: x  Gluc: 127 mg/dL / Ketone: x  / Bili: x / Urobili: x   Blood: x / Protein: x / Nitrite: x   Leuk Esterase: x / RBC: x / WBC x   Sq Epi: x / Non Sq Epi: x / Bacteria: x      CAPILLARY BLOOD GLUCOSE      POCT Blood Glucose.: 126 mg/dL (20 Nov 2023 08:40)          RADIOLOGY & ADDITIONAL TESTS: ____    Personally reviewed.     Consultant(s) Notes Reviewed:  [x] YES  [ ] NO

## 2023-11-21 NOTE — PROGRESS NOTE ADULT - ASSESSMENT
69 yo male w/ pmh COPD, HTN, HLD, Hypertriglyceridemia, BRIAN, Prediabetes, Anxiety/Depression, PTSD, GERD, BRANDON presents with weakness    copd  pna  brandon  HTN  HLD  DM  Anxiety  Depression  GERD  PTSD    ID eval in progress  fio2 wean as tolerated  enc use of NIV for BRANDON  Blood gas noted - chr changes    low dose steroids and nebs for copd  VBG noted  ct imaging reviewed - eval for PNA  emp ABX  biomarkers - cx -   cough rx regimen  DM care  serial FS  BRANDON - night time CPAP  PPI for GERD  anxiolysis  emotional support  monitor VS and Sat 67 yo male w/ pmh COPD, HTN, HLD, Hypertriglyceridemia, BRIAN, Prediabetes, Anxiety/Depression, PTSD, GERD, BRANDON presents with weakness    copd  pna  brandon  HTN  HLD  DM  Anxiety  Depression  GERD  PTSD    ID eval in progress  fio2 wean as tolerated  enc use of NIV for BRANDON - pt does not use device at home  Blood gas noted - chr changes    low dose steroids and nebs for copd  VBG noted  ct imaging reviewed - eval for PNA  emp ABX  biomarkers - cx -   cough rx regimen  DM care  serial FS  BRANDON - does not use CPAP - sleep hygiene reviewed  PPI for GERD  anxiolysis  emotional support  monitor VS and Sat

## 2023-11-21 NOTE — PROGRESS NOTE ADULT - PROBLEM SELECTOR PLAN 2
h/o iron deficiency anemia w/ multiple iron transfusions in his past  -colonoscopy 2 years ago w/ polyps and diverticuli; father history colon cancer  -HgB 7.8, Hct 27.4  -f/u iron studies  -transfuse for HgB <7 or if patient symptomatic  -on [facility] HIE in Jan 2022 patient had a Hgb of 13.1 and today it is 7.8  -f/u FOBT h/o iron deficiency anemia w/ multiple iron transfusions in his past  -colonoscopy 2 years ago w/ polyps and diverticuli; father history colon cancer  -HgB dropped to 7.1 from 7.8  -low iron saturation, s/w iron tablets TID  -transfuse for HgB <7 or if patient symptomatic  -on [facility] in Jan 2022 patient had a Hgb of 13.1 and today it is 7.8  -f/u FOBT h/o iron deficiency anemia w/ multiple iron transfusions in his past  -colonoscopy 2 years ago w/ polyps and diverticuli; father history colon cancer  -HgB dropped to 7.1 from 7.8  -low iron saturation, s/w iron tablets TID,   -transfuse for HgB <7 or if patient symptomatic  -on [facility] in Jan 2022 patient had a Hgb of 13.1 and today it is 7.8  -f/u FOBT h/o iron deficiency anemia w/ multiple iron transfusions  and iron infusion in his past  -colonoscopy 2 years ago w/ polyps and diverticula; father history colon cancer  -HgB dropped to 7.1 from 7.8  -low iron saturation, s/w iron tablets TID,   -transfuse for HgB <7 or if patient symptomatic  -on [facility] in Jan 2022 patient had a Hgb of 13.1 and today it is 7.8  -f/u FOBT- no active bleed noticed

## 2023-11-21 NOTE — PROGRESS NOTE ADULT - PROBLEM SELECTOR PLAN 4
h/o mild COPD not on home O2  -takes symbicort inhaler daily, increased use of his albuterol inhaler over the past few days  -ABG revealed CO2 retention  -Dr Funes consulted  -Amina q6h

## 2023-11-21 NOTE — DISCHARGE NOTE PROVIDER - NSDCACTIVITY_GEN_ALL_CORE
Records faxed to Dr Walton   Do not drive or operate machinery/No heavy lifting/straining No heavy lifting/straining

## 2023-11-21 NOTE — PROGRESS NOTE ADULT - PROBLEM SELECTOR PLAN 1
h/o bronchitis 1 month ago treated with two rounds of abx and steroids  -CT Chest: Findings concerning for multifocal pneumonia, most prominent in the right  upper lobe. Slightly enlarged mediastinal and right hilar lymph nodes, nonspecific and possibly reactive.  -WBC 14.44, Neutrophils 83.5%  -Continue Rocephin, Azithromycin w/ presumed CAP  -Monitor for fever, trend WBC  -Pt advised to get f/u chest  imaging 4-6 weeks from discharge for resolution of pna to be arranged by PCP. Lympadenoapthy possible reactive due to infection  -lung nodule seen on CT scan- patient informed and instructed to f/u with PCP to discuss surveillance imaging at next appt  -Dr Funes consulted  -Dr Mart consulted h/o bronchitis 1 month ago treated with two rounds of abx and steroids  -CT Chest: Findings concerning for multifocal pneumonia, most prominent in the right  upper lobe. Slightly enlarged mediastinal and right hilar lymph nodes, nonspecific and possibly reactive.  -WBC 14.44, Neutrophils 83.5%  -Continue Rocephin, Azithromycin w/ presumed CAP  - f/u urine legionella and strep pneumo  -Monitor for fever, trend WBC  -Dr Funes consulted  -Dr Mart consulted

## 2023-11-21 NOTE — DISCHARGE NOTE PROVIDER - CARE PROVIDER_API CALL
Lázaro Gale  Family Medicine  [address], [address]  [place] [address]  Phone: [phone]  Fax: [phone]  Follow Up Time:     Perlman, Craig Douglas  Internal Medicine  [address] [place] [address], [address]  [place] [address]  Phone: [phone]  Fax: [phone]  Follow Up Time:     Camden Mart  Infectious Disease  [address] [place] [address]  Phone: [phone]  Fax: [phone]  Follow Up Time:     MD, Pulmonary  Get referral from your PCP  Phone: (   )    -  Fax: (   )    -  Follow Up Time:    Lázaro Gale  [facility] Medicine  [address] [place] [address], [address]  [place] [address]  Phone: [phone]  Fax: [phone]  Follow Up Time:     Camden San  Cardiovascular Disease  [address] [place] [address]  Phone: [phone]  Fax: [phone]  Follow Up Time:

## 2023-11-21 NOTE — DISCHARGE NOTE PROVIDER - PROVIDER TOKENS
PROVIDER:[TOKEN:[[id number]]],PROVIDER:[TOKEN:[[id number]]],PROVIDER:[TOKEN:[[id number]]],FREE:[LAST:[MD],FIRST:[Pulmonary],PHONE:[(   )    -],FAX:[(   )    -],ADDRESS:[Get referral from your PCP]] PROVIDER:[TOKEN:[[id number]]],PROVIDER:[TOKEN:[[id number]]]

## 2023-11-21 NOTE — PROGRESS NOTE ADULT - SUBJECTIVE AND OBJECTIVE BOX
Date/Time Patient Seen:  		  Referring MD:   Data Reviewed	       Patient is a 68y old  Male who presents with a chief complaint of Pneumonia (20 Nov 2023 17:08)      Subjective/HPI     PAST MEDICAL & SURGICAL HISTORY:  Hypertension    High cholesterol    COPD (chronic obstructive pulmonary disease)    Asthma    GERD (gastroesophageal reflux disease)    Chronic rhinosinusitis    PTSD (post-traumatic stress disorder)  September 11th 2001    Osteoarthrosis, localized    Bakers cyst  Bilateral    Sleep apnea  does not use CPAP machine    Prediabetes    Iron deficiency anemia    S/P knee replacement, right    S/P tonsillectomy and adenoidectomy    S/P wrist surgery  ganglion cyst    S/P arthroscopic surgery of left knee    Bakers cyst  Bilateral removal Bakers Cyst          Medication list         MEDICATIONS  (STANDING):  albuterol/ipratropium for Nebulization 3 milliLiter(s) Nebulizer every 6 hours  amLODIPine   Tablet 5 milliGRAM(s) Oral daily  ARIPiprazole 20 milliGRAM(s) Oral daily  atorvastatin 20 milliGRAM(s) Oral at bedtime  azithromycin  IVPB 500 milliGRAM(s) IV Intermittent every 24 hours  budesonide 160 MICROgram(s)/formoterol 4.5 MICROgram(s) Inhaler 2 Puff(s) Inhalation two times a day  buPROPion XL (24-Hour) . 300 milliGRAM(s) Oral daily  cefTRIAXone   IVPB 1000 milliGRAM(s) IV Intermittent every 24 hours  famotidine    Tablet 20 milliGRAM(s) Oral two times a day  LORazepam     Tablet 1 milliGRAM(s) Oral <User Schedule>  LORazepam     Tablet 1.5 milliGRAM(s) Oral <User Schedule>  pantoprazole    Tablet 40 milliGRAM(s) Oral every 12 hours  predniSONE   Tablet 20 milliGRAM(s) Oral daily  sertraline 200 milliGRAM(s) Oral daily  traZODone 150 milliGRAM(s) Oral at bedtime    MEDICATIONS  (PRN):  acetaminophen     Tablet .. 650 milliGRAM(s) Oral every 6 hours PRN Temp greater or equal to 38C (100.4F), Mild Pain (1 - 3)  albuterol    90 MICROgram(s) HFA Inhaler 2 Puff(s) Inhalation every 6 hours PRN for bronchospasm  aluminum hydroxide/magnesium hydroxide/simethicone Suspension 30 milliLiter(s) Oral every 4 hours PRN Dyspepsia  guaiFENesin Oral Liquid (Sugar-Free) 200 milliGRAM(s) Oral every 6 hours PRN Cough  melatonin 3 milliGRAM(s) Oral at bedtime PRN Insomnia  ondansetron Injectable 4 milliGRAM(s) IV Push every 8 hours PRN Nausea and/or Vomiting         Vitals log        ICU Vital Signs Last 24 Hrs  T(C): 36.7 (21 Nov 2023 05:28), Max: 36.7 (20 Nov 2023 08:16)  T(F): 98 (21 Nov 2023 05:28), Max: 98.1 (20 Nov 2023 20:44)  HR: 94 (21 Nov 2023 05:28) (71 - 96)  BP: 164/75 (21 Nov 2023 05:28) (142/61 - 164/75)  BP(mean): --  ABP: --  ABP(mean): --  RR: 18 (21 Nov 2023 05:28) (18 - 18)  SpO2: 95% (21 Nov 2023 05:28) (91% - 99%)    O2 Parameters below as of 21 Nov 2023 05:28  Patient On (Oxygen Delivery Method): nasal cannula  O2 Flow (L/min): 3               Input and Output:  I&O's Detail      Lab Data                        7.8    14.44 )-----------( 289      ( 20 Nov 2023 09:20 )             27.4     11-20    143  |  107  |  24<H>  ----------------------------<  130<H>  4.4   |  31  |  1.00    Ca    8.9      20 Nov 2023 09:20    TPro  7.9  /  Alb  3.6  /  TBili  0.5  /  DBili  x   /  AST  69<H>  /  ALT  101<H>  /  AlkPhos  103  11-20    ABG - ( 20 Nov 2023 11:00 )  pH, Arterial: 7.40  pH, Blood: x     /  pCO2: 58    /  pO2: 92    / HCO3: 36    / Base Excess: 11.1  /  SaO2: 99.1                    Review of Systems	      Objective     Physical Examination    heart s1s2  lung dc BS  head nc      Pertinent Lab findings & Imaging      Sun:  NO   Adequate UO     I&O's Detail           Discussed with:     Cultures:	        Radiology

## 2023-11-21 NOTE — DISCHARGE NOTE PROVIDER - CARE PROVIDERS DIRECT ADDRESSES
,DirectAddress_Unknown,DirectAddress_Unknown,[email or web address],DirectAddress_Unknown ,DirectAddress_Unknown,[email or web address]

## 2023-11-21 NOTE — DISCHARGE NOTE PROVIDER - ATTENDING DISCHARGE PHYSICAL EXAMINATION:
T(C): 36.8 (11-23-23 @ 04:08), Max: 36.8 (11-22-23 @ 11:44)  HR: 72 (11-23-23 @ 04:08) (72 - 90)  BP: 146/78 (11-23-23 @ 04:08) (146/78 - 166/75)  RR: 19 (11-23-23 @ 04:08) (18 - 19)  SpO2: 90% (11-23-23 @ 04:08) (90% - 98%)    General: No apparent distress  Head: normocephalic, atraumatic  Eyes: EOMI, anicteric  ENT: moist mucous membranes, no pharyngeal exudates  Heart: rrr, S1, S2, no murmurs  Chest: decreased breath sounds  Abd: BS+, soft, NT, ND  Back: no CVA tenderness  Extr: no edema or cyanosis  Skin: warm, well perfused  Neuro: AA&Ox3, no focal weakness, sensation to light touch intact  Psych: normal affect

## 2023-11-22 DIAGNOSIS — E11.9 TYPE 2 DIABETES MELLITUS WITHOUT COMPLICATIONS: ICD-10-CM

## 2023-11-22 LAB
ANION GAP SERPL CALC-SCNC: 8 MMOL/L — SIGNIFICANT CHANGE UP (ref 5–17)
ANION GAP SERPL CALC-SCNC: 8 MMOL/L — SIGNIFICANT CHANGE UP (ref 5–17)
BASOPHILS # BLD AUTO: 0.04 K/UL — SIGNIFICANT CHANGE UP (ref 0–0.2)
BASOPHILS # BLD AUTO: 0.04 K/UL — SIGNIFICANT CHANGE UP (ref 0–0.2)
BASOPHILS NFR BLD AUTO: 0.4 % — SIGNIFICANT CHANGE UP (ref 0–2)
BASOPHILS NFR BLD AUTO: 0.4 % — SIGNIFICANT CHANGE UP (ref 0–2)
BUN SERPL-MCNC: 16 MG/DL — SIGNIFICANT CHANGE UP (ref 7–23)
BUN SERPL-MCNC: 16 MG/DL — SIGNIFICANT CHANGE UP (ref 7–23)
CALCIUM SERPL-MCNC: 8.6 MG/DL — SIGNIFICANT CHANGE UP (ref 8.5–10.1)
CALCIUM SERPL-MCNC: 8.6 MG/DL — SIGNIFICANT CHANGE UP (ref 8.5–10.1)
CHLORIDE SERPL-SCNC: 103 MMOL/L — SIGNIFICANT CHANGE UP (ref 96–108)
CHLORIDE SERPL-SCNC: 103 MMOL/L — SIGNIFICANT CHANGE UP (ref 96–108)
CO2 SERPL-SCNC: 29 MMOL/L — SIGNIFICANT CHANGE UP (ref 22–31)
CO2 SERPL-SCNC: 29 MMOL/L — SIGNIFICANT CHANGE UP (ref 22–31)
CREAT SERPL-MCNC: 0.99 MG/DL — SIGNIFICANT CHANGE UP (ref 0.5–1.3)
CREAT SERPL-MCNC: 0.99 MG/DL — SIGNIFICANT CHANGE UP (ref 0.5–1.3)
EGFR: 83 ML/MIN/1.73M2 — SIGNIFICANT CHANGE UP
EGFR: 83 ML/MIN/1.73M2 — SIGNIFICANT CHANGE UP
EOSINOPHIL # BLD AUTO: 0.14 K/UL — SIGNIFICANT CHANGE UP (ref 0–0.5)
EOSINOPHIL # BLD AUTO: 0.14 K/UL — SIGNIFICANT CHANGE UP (ref 0–0.5)
EOSINOPHIL NFR BLD AUTO: 1.3 % — SIGNIFICANT CHANGE UP (ref 0–6)
EOSINOPHIL NFR BLD AUTO: 1.3 % — SIGNIFICANT CHANGE UP (ref 0–6)
GLUCOSE SERPL-MCNC: 237 MG/DL — HIGH (ref 70–99)
GLUCOSE SERPL-MCNC: 237 MG/DL — HIGH (ref 70–99)
HCT VFR BLD CALC: 26.7 % — LOW (ref 39–50)
HCT VFR BLD CALC: 26.7 % — LOW (ref 39–50)
HGB BLD-MCNC: 7.6 G/DL — LOW (ref 13–17)
HGB BLD-MCNC: 7.6 G/DL — LOW (ref 13–17)
IMM GRANULOCYTES NFR BLD AUTO: 1.4 % — HIGH (ref 0–0.9)
IMM GRANULOCYTES NFR BLD AUTO: 1.4 % — HIGH (ref 0–0.9)
LYMPHOCYTES # BLD AUTO: 0.72 K/UL — LOW (ref 1–3.3)
LYMPHOCYTES # BLD AUTO: 0.72 K/UL — LOW (ref 1–3.3)
LYMPHOCYTES # BLD AUTO: 6.9 % — LOW (ref 13–44)
LYMPHOCYTES # BLD AUTO: 6.9 % — LOW (ref 13–44)
MCHC RBC-ENTMCNC: 21.8 PG — LOW (ref 27–34)
MCHC RBC-ENTMCNC: 21.8 PG — LOW (ref 27–34)
MCHC RBC-ENTMCNC: 28.5 GM/DL — LOW (ref 32–36)
MCHC RBC-ENTMCNC: 28.5 GM/DL — LOW (ref 32–36)
MCV RBC AUTO: 76.5 FL — LOW (ref 80–100)
MCV RBC AUTO: 76.5 FL — LOW (ref 80–100)
MONOCYTES # BLD AUTO: 0.5 K/UL — SIGNIFICANT CHANGE UP (ref 0–0.9)
MONOCYTES # BLD AUTO: 0.5 K/UL — SIGNIFICANT CHANGE UP (ref 0–0.9)
MONOCYTES NFR BLD AUTO: 4.8 % — SIGNIFICANT CHANGE UP (ref 2–14)
MONOCYTES NFR BLD AUTO: 4.8 % — SIGNIFICANT CHANGE UP (ref 2–14)
NEUTROPHILS # BLD AUTO: 8.86 K/UL — HIGH (ref 1.8–7.4)
NEUTROPHILS # BLD AUTO: 8.86 K/UL — HIGH (ref 1.8–7.4)
NEUTROPHILS NFR BLD AUTO: 85.2 % — HIGH (ref 43–77)
NEUTROPHILS NFR BLD AUTO: 85.2 % — HIGH (ref 43–77)
NRBC # BLD: 0 /100 WBCS — SIGNIFICANT CHANGE UP (ref 0–0)
NRBC # BLD: 0 /100 WBCS — SIGNIFICANT CHANGE UP (ref 0–0)
PLATELET # BLD AUTO: 258 K/UL — SIGNIFICANT CHANGE UP (ref 150–400)
PLATELET # BLD AUTO: 258 K/UL — SIGNIFICANT CHANGE UP (ref 150–400)
POTASSIUM SERPL-MCNC: 4 MMOL/L — SIGNIFICANT CHANGE UP (ref 3.5–5.3)
POTASSIUM SERPL-MCNC: 4 MMOL/L — SIGNIFICANT CHANGE UP (ref 3.5–5.3)
POTASSIUM SERPL-SCNC: 4 MMOL/L — SIGNIFICANT CHANGE UP (ref 3.5–5.3)
POTASSIUM SERPL-SCNC: 4 MMOL/L — SIGNIFICANT CHANGE UP (ref 3.5–5.3)
RBC # BLD: 3.49 M/UL — LOW (ref 4.2–5.8)
RBC # BLD: 3.49 M/UL — LOW (ref 4.2–5.8)
RBC # FLD: 15.9 % — HIGH (ref 10.3–14.5)
RBC # FLD: 15.9 % — HIGH (ref 10.3–14.5)
SODIUM SERPL-SCNC: 140 MMOL/L — SIGNIFICANT CHANGE UP (ref 135–145)
SODIUM SERPL-SCNC: 140 MMOL/L — SIGNIFICANT CHANGE UP (ref 135–145)
WBC # BLD: 10.41 K/UL — SIGNIFICANT CHANGE UP (ref 3.8–10.5)
WBC # BLD: 10.41 K/UL — SIGNIFICANT CHANGE UP (ref 3.8–10.5)
WBC # FLD AUTO: 10.41 K/UL — SIGNIFICANT CHANGE UP (ref 3.8–10.5)
WBC # FLD AUTO: 10.41 K/UL — SIGNIFICANT CHANGE UP (ref 3.8–10.5)

## 2023-11-22 PROCEDURE — 99233 SBSQ HOSP IP/OBS HIGH 50: CPT | Mod: GC

## 2023-11-22 PROCEDURE — 99221 1ST HOSP IP/OBS SF/LOW 40: CPT

## 2023-11-22 RX ORDER — SODIUM CHLORIDE 9 MG/ML
1000 INJECTION, SOLUTION INTRAVENOUS
Refills: 0 | Status: DISCONTINUED | OUTPATIENT
Start: 2023-11-22 | End: 2023-11-25

## 2023-11-22 RX ORDER — DEXTROSE 50 % IN WATER 50 %
25 SYRINGE (ML) INTRAVENOUS ONCE
Refills: 0 | Status: DISCONTINUED | OUTPATIENT
Start: 2023-11-22 | End: 2023-11-25

## 2023-11-22 RX ORDER — INSULIN LISPRO 100/ML
VIAL (ML) SUBCUTANEOUS AT BEDTIME
Refills: 0 | Status: DISCONTINUED | OUTPATIENT
Start: 2023-11-22 | End: 2023-11-25

## 2023-11-22 RX ORDER — INSULIN LISPRO 100/ML
VIAL (ML) SUBCUTANEOUS
Refills: 0 | Status: DISCONTINUED | OUTPATIENT
Start: 2023-11-22 | End: 2023-11-25

## 2023-11-22 RX ORDER — DEXTROSE 50 % IN WATER 50 %
15 SYRINGE (ML) INTRAVENOUS ONCE
Refills: 0 | Status: DISCONTINUED | OUTPATIENT
Start: 2023-11-22 | End: 2023-11-25

## 2023-11-22 RX ORDER — IRON SUCROSE 20 MG/ML
100 INJECTION, SOLUTION INTRAVENOUS EVERY 24 HOURS
Refills: 0 | Status: COMPLETED | OUTPATIENT
Start: 2023-11-22 | End: 2023-11-24

## 2023-11-22 RX ORDER — GLUCAGON INJECTION, SOLUTION 0.5 MG/.1ML
1 INJECTION, SOLUTION SUBCUTANEOUS ONCE
Refills: 0 | Status: DISCONTINUED | OUTPATIENT
Start: 2023-11-22 | End: 2023-11-25

## 2023-11-22 RX ORDER — DEXTROSE 50 % IN WATER 50 %
12.5 SYRINGE (ML) INTRAVENOUS ONCE
Refills: 0 | Status: DISCONTINUED | OUTPATIENT
Start: 2023-11-22 | End: 2023-11-25

## 2023-11-22 RX ADMIN — Medication 1.5 MILLIGRAM(S): at 05:04

## 2023-11-22 RX ADMIN — CEFTRIAXONE 100 MILLIGRAM(S): 500 INJECTION, POWDER, FOR SOLUTION INTRAMUSCULAR; INTRAVENOUS at 20:43

## 2023-11-22 RX ADMIN — POLYETHYLENE GLYCOL 3350 17 GRAM(S): 17 POWDER, FOR SOLUTION ORAL at 05:04

## 2023-11-22 RX ADMIN — Medication 1.5 MILLIGRAM(S): at 20:37

## 2023-11-22 RX ADMIN — IRON SUCROSE 100 MILLIGRAM(S): 20 INJECTION, SOLUTION INTRAVENOUS at 12:23

## 2023-11-22 RX ADMIN — Medication 20 MILLIGRAM(S): at 05:04

## 2023-11-22 RX ADMIN — Medication 1 MILLIGRAM(S): at 12:22

## 2023-11-22 RX ADMIN — AMLODIPINE BESYLATE 10 MILLIGRAM(S): 2.5 TABLET ORAL at 05:04

## 2023-11-22 RX ADMIN — POLYETHYLENE GLYCOL 3350 17 GRAM(S): 17 POWDER, FOR SOLUTION ORAL at 17:09

## 2023-11-22 RX ADMIN — Medication 150 MILLIGRAM(S): at 21:14

## 2023-11-22 RX ADMIN — PANTOPRAZOLE SODIUM 40 MILLIGRAM(S): 20 TABLET, DELAYED RELEASE ORAL at 05:04

## 2023-11-22 RX ADMIN — Medication 1: at 17:10

## 2023-11-22 RX ADMIN — FAMOTIDINE 20 MILLIGRAM(S): 10 INJECTION INTRAVENOUS at 05:04

## 2023-11-22 RX ADMIN — Medication 325 MILLIGRAM(S): at 05:04

## 2023-11-22 RX ADMIN — ARIPIPRAZOLE 20 MILLIGRAM(S): 15 TABLET ORAL at 12:22

## 2023-11-22 RX ADMIN — SERTRALINE 200 MILLIGRAM(S): 25 TABLET, FILM COATED ORAL at 12:22

## 2023-11-22 RX ADMIN — AZITHROMYCIN 500 MILLIGRAM(S): 500 TABLET, FILM COATED ORAL at 12:22

## 2023-11-22 RX ADMIN — Medication 3 MILLILITER(S): at 07:42

## 2023-11-22 RX ADMIN — ATORVASTATIN CALCIUM 20 MILLIGRAM(S): 80 TABLET, FILM COATED ORAL at 21:14

## 2023-11-22 RX ADMIN — FAMOTIDINE 20 MILLIGRAM(S): 10 INJECTION INTRAVENOUS at 17:10

## 2023-11-22 RX ADMIN — Medication 3 MILLIGRAM(S): at 00:23

## 2023-11-22 RX ADMIN — Medication 3 MILLILITER(S): at 13:38

## 2023-11-22 RX ADMIN — PANTOPRAZOLE SODIUM 40 MILLIGRAM(S): 20 TABLET, DELAYED RELEASE ORAL at 17:10

## 2023-11-22 RX ADMIN — BUPROPION HYDROCHLORIDE 300 MILLIGRAM(S): 150 TABLET, EXTENDED RELEASE ORAL at 12:22

## 2023-11-22 RX ADMIN — Medication 3 MILLILITER(S): at 19:34

## 2023-11-22 RX ADMIN — BUDESONIDE AND FORMOTEROL FUMARATE DIHYDRATE 2 PUFF(S): 160; 4.5 AEROSOL RESPIRATORY (INHALATION) at 21:14

## 2023-11-22 NOTE — PROGRESS NOTE ADULT - PROBLEM SELECTOR PLAN 9
-Pt advised to get f/u chest  imaging 4-6 weeks from discharge for resolution of pna to be arranged by PCP. Lymphadenopathy possible reactive due to infection -Pt advised to get f/u chest imaging 4-6 weeks from discharge for resolution of PNA to be arranged by PCP. Lymphadenopathy possible reactive due to infection

## 2023-11-22 NOTE — PROGRESS NOTE ADULT - ASSESSMENT
67 yo male w/ pmh COPD, HTN, HLD, Hypertriglyceridemia, BRIAN, Prediabetes, Anxiety/Depression, PTSD, GERD, BRANDON presents with weakness    copd  pna  brandon  HTN  HLD  DM  Anxiety  Depression  GERD  PTSD    abx  nebs  steroids low dose  vs noted  does not use NIV for BRANDON - sleep hygiene reviewed    low dose steroids and nebs for copd  VBG noted  ct imaging reviewed - eval for PNA  emp ABX  biomarkers - cx -   cough rx regimen  DM care  serial FS  BRANDON - does not use CPAP - sleep hygiene reviewed  PPI for GERD  anxiolysis  emotional support  monitor VS and Sat 67 yo male w/ pmh COPD, HTN, HLD, Hypertriglyceridemia, BRIAN, Prediabetes, Anxiety/Depression, PTSD, GERD, BOOKER presents with weakness

## 2023-11-22 NOTE — PROGRESS NOTE ADULT - PROBLEM SELECTOR PLAN 9
-Pt advised to get f/u chest  imaging 4-6 weeks from discharge for resolution of pna to be arranged by PCP. Lymphadenopathy possible reactive due to infection

## 2023-11-22 NOTE — PROGRESS NOTE ADULT - PROBLEM SELECTOR PLAN 4
h/o mild COPD not on home O2  -takes symbicort inhaler daily, increased use of his albuterol inhaler over the past few days  -ABG revealed CO2 retention  -Dr Funes consulted  -Amina q6h h/o mild COPD not on home O2  -takes Symbicort inhaler daily, increased use of his albuterol inhaler over the past few days  -ABG revealed CO2 retention  -pulm, Dr Funes consulted, recs appreciated   -Amina q6h

## 2023-11-22 NOTE — PROGRESS NOTE ADULT - ASSESSMENT
67 yo male w/ pmh COPD, HTN, HLD, Hypertriglyceridemia, BRIAN, Prediabetes, Anxiety/Depression, PTSD, GERD, BOOKER admitted for multifocal pneumonia and anemia. 67yo M w/ PMH of COPD (not on O2), HTN, HLD, Hypertriglyceridemia, BRIAN (hx of needing iron infusions), Prediabetes, Anxiety/Depression, PTSD, GERD, BOOKER admitted for multifocal pneumonia and anemia.

## 2023-11-22 NOTE — PROGRESS NOTE ADULT - PROBLEM SELECTOR PLAN 2
h/o iron deficiency anemia w/ multiple iron transfusions  and iron infusion in his past  -colonoscopy 2 years ago w/ polyps and diverticula; father history colon cancer  -HgB dropped to 7.1 from 7.8  -low iron saturation, s/p iron tablets TID x1 day, start with IV iron for 3 days  -transfuse for HgB <7 or if patient symptomatic  -on [facility] in Jan 2022 patient had a Hgb of 13.1 and today it is 7.8  -f/u FOBT- no active bleed noticed  -type and screen ordered for tomorrow -h/o iron deficiency anemia w/ multiple iron transfusions with hematology in the past but has not seen for >1 year  -EGD/colonoscopy 2 years ago w/ gastritis, nonmalignant polyps and diverticula per pt; father history colon cancer  -Hgb stable in mid 7s currently  -severe iron deficiency; discussed risks/benefits of venofer and pt agreed to venofer -- started today, will give for 2-3 days depending on how long pt is hospitalized  -transfuse for HgB <7 or if patient symptomatic  -on [facility] in Jan 2022 patient had a Hgb of 13.1 and now it is in mid-7s  -f/u FOBT- no active bleed noticed  -type and screen

## 2023-11-22 NOTE — PROGRESS NOTE ADULT - PROBLEM SELECTOR PLAN 6
-Hgb found to be 6.5, new onset diabetes   -start with low dose ISS  -diabetes educator consulted -Hgb found to be 6.5%, new onset diabetes   -start with low dose ISS  -diabetes educator consulted

## 2023-11-22 NOTE — CONSULT NOTE ADULT - ASSESSMENT
Physical Exam:   Vital Signs Last 24 Hrs  T(C): 36.8 (22 Nov 2023 11:44), Max: 36.8 (22 Nov 2023 11:44)  T(F): 98.3 (22 Nov 2023 11:44), Max: 98.3 (22 Nov 2023 11:44)  HR: 78 (22 Nov 2023 14:03) (73 - 88)  BP: 159/86 (22 Nov 2023 11:44) (150/75 - 168/74)  BP(mean): --  RR: 18 (22 Nov 2023 11:44) (18 - 19)  SpO2: 98% (22 Nov 2023 14:03) (93% - 98%)    Parameters below as of 22 Nov 2023 14:03  Patient On (Oxygen Delivery Method): nasal cannula, 3L       CAPILLARY BLOOD GLUCOSE          Cholesterol, Serum: 113 mg/dL (05.19.21 @ 08:36)     HDL Cholesterol, Serum: 22 mg/dL (05.19.21 @ 08:36)     LDL Cholesterol Calculated: 66 mg/dL (05.19.21 @ 08:36)     DIET: CC  >50%

## 2023-11-22 NOTE — PROGRESS NOTE ADULT - ASSESSMENT
69 yo male w/ pmh COPD, HTN, HLD, Hypertriglyceridemia, BRIAN, Prediabetes, Anxiety/Depression, PTSD, GERD, BRANDON presents with weakness    copd  pna  brandon  HTN  HLD  DM  Anxiety  Depression  GERD  PTSD    abx  nebs  steroids  not using CPAP  sleep hygiene reviewed  Hgb noted  Labs reviewed    low dose steroids and nebs for copd  VBG noted  ct imaging reviewed - eval for PNA  emp ABX  biomarkers - cx -   cough rx regimen  DM care  serial FS  BRANDON - does not use CPAP - sleep hygiene reviewed  PPI for GERD  anxiolysis  emotional support  monitor VS and Sat

## 2023-11-22 NOTE — PROGRESS NOTE ADULT - ASSESSMENT
69 yo male w/ pmh COPD, HTN, HLD, Hypertriglyceridemia, BRIAN, Prediabetes, Anxiety/Depression, PTSD, GERD, BOOKER presents with weakness x2 weeks.  Daughter found him home, blue lips WBC 14.44,   CT Chest: Findings concerning for multifocal pneumonia, most prominent in the right upper lobe. Slightly enlarged mediastinal and right hilar lymph nodes, nonspecific and possibly reactive.    RECOMMENDATIONS  1-PNA compelling story and agree with ATS guideline based Rx  Azithromycin x 3 days started 11/20 changed to PO 11/21 w 11/22 as last day -urine legionella not collected and pt improved so cancelled urine legionella   RVP-neg  Ceftriaxone started 11/20 still on oxygen but exam much improved  fine to finish course with Cefuroxime 500mg PO BID last day 11/24 when pt is ready for discharge      2-Anemia per medicine    Thank you for consulting us and involving us in the management of this most interesting and challenging case.  We will follow along in the care of this patient. Please call us at [phone] or text me directly on my cell# at [phone] with any concerns.    Starting tomorrow Dr Andres Smith will be covering this patient so please contact him with further questions office [phone] or our answering service at [phone]

## 2023-11-22 NOTE — PROGRESS NOTE ADULT - PROBLEM SELECTOR PLAN 1
h/o bronchitis 1 month ago treated with two rounds of abx and steroids  -CT Chest: Findings concerning for multifocal pneumonia, most prominent in the right  upper lobe. Slightly enlarged mediastinal and right hilar lymph nodes, nonspecific and possibly reactive.  -WBC 14.44, Neutrophils 83.5%  -Continue Rocephin, Azithromycin w/ presumed CAP  - f/u urine legionella and strep pneumo  -Monitor for fever, trend WBC  -Dr Funes consulted  -Dr Mart consulted

## 2023-11-22 NOTE — PROGRESS NOTE ADULT - SUBJECTIVE AND OBJECTIVE BOX
[facility] DIVISION of INFECTIOUS DISEASE  Camden Mart MD PhD, Janeen Mckeon MD, Cammie Gomez MD, Joyd Nur MD, Andres Smith MD  and providing coverage with Morteza Otero MD  Providing Infectious Disease Consultations at [facility], [facility], [facility]'s    Office# [phone] to schedule follow up appointments  Answering Service for urgent calls or New Consults [phone]  Cell# to text for urgent issues Camden Mart [phone]     infectious diseases progress note:    ELTON IVERSON is a 68y y. o. Male patient    Overnight and events of the last 24hrs reviewed    Allergies    No Known Drug Allergies  latex (Rash)    Intolerances        ANTIBIOTICS/RELEVANT:  antimicrobials  cefTRIAXone   IVPB 1000 milliGRAM(s) IV Intermittent every 24 hours    immunologic:    OTHER:  acetaminophen     Tablet .. 650 milliGRAM(s) Oral every 6 hours PRN  albuterol    90 MICROgram(s) HFA Inhaler 2 Puff(s) Inhalation every 6 hours PRN  albuterol/ipratropium for Nebulization 3 milliLiter(s) Nebulizer every 6 hours  aluminum hydroxide/magnesium hydroxide/simethicone Suspension 30 milliLiter(s) Oral every 4 hours PRN  amLODIPine   Tablet 10 milliGRAM(s) Oral daily  ARIPiprazole 20 milliGRAM(s) Oral daily  atorvastatin 20 milliGRAM(s) Oral at bedtime  budesonide 160 MICROgram(s)/formoterol 4.5 MICROgram(s) Inhaler 2 Puff(s) Inhalation two times a day  buPROPion XL (24-Hour) . 300 milliGRAM(s) Oral daily  dextrose 5%. 1000 milliLiter(s) IV Continuous <Continuous>  dextrose 5%. 1000 milliLiter(s) IV Continuous <Continuous>  dextrose 50% Injectable 12.5 Gram(s) IV Push once  dextrose 50% Injectable 25 Gram(s) IV Push once  dextrose 50% Injectable 25 Gram(s) IV Push once  dextrose Oral Gel 15 Gram(s) Oral once PRN  famotidine    Tablet 20 milliGRAM(s) Oral two times a day  glucagon  Injectable 1 milliGRAM(s) IntraMuscular once  guaiFENesin Oral Liquid (Sugar-Free) 200 milliGRAM(s) Oral every 6 hours PRN  insulin lispro (ADMELOG) corrective regimen sliding scale   SubCutaneous three times a day before meals  insulin lispro (ADMELOG) corrective regimen sliding scale   SubCutaneous at bedtime  iron sucrose Injectable 100 milliGRAM(s) IV Push every 24 hours  LORazepam     Tablet 1.5 milliGRAM(s) Oral <User Schedule>  LORazepam     Tablet 1 milliGRAM(s) Oral <User Schedule>  melatonin 3 milliGRAM(s) Oral at bedtime PRN  ondansetron Injectable 4 milliGRAM(s) IV Push every 8 hours PRN  pantoprazole    Tablet 40 milliGRAM(s) Oral every 12 hours  polyethylene glycol 3350 17 Gram(s) Oral two times a day  predniSONE   Tablet 20 milliGRAM(s) Oral daily  sertraline 200 milliGRAM(s) Oral daily  traZODone 150 milliGRAM(s) Oral at bedtime      Objective:  Vital Signs Last 24 Hrs  T(C): 36.8 (22 Nov 2023 11:44), Max: 36.8 (22 Nov 2023 11:44)  T(F): 98.3 (22 Nov 2023 11:44), Max: 98.3 (22 Nov 2023 11:44)  HR: 73 (22 Nov 2023 11:44) (73 - 88)  BP: 159/86 (22 Nov 2023 11:44) (150/75 - 168/74)  BP(mean): --  RR: 18 (22 Nov 2023 11:44) (18 - 19)  SpO2: 98% (22 Nov 2023 11:44) (93% - 98%)    Parameters below as of 22 Nov 2023 11:44  Patient On (Oxygen Delivery Method): nasal cannula  O2 Flow (L/min): 3      T(C): 36.8 (11-22-23 @ 11:44), Max: 36.8 (11-22-23 @ 11:44)  T(C): 36.8 (11-22-23 @ 11:44), Max: 36.8 (11-22-23 @ 11:44)  T(C): 36.8 (11-22-23 @ 11:44), Max: 36.8 (11-22-23 @ 11:44)    PHYSICAL EXAM:  HEENT: NC atraumatic  Neck: supple  Respiratory: no accessory muscle use, breathing comfortably, CTA  Cardiovascular: distant  Gastrointestinal: normal appearing, nondistended  Extremities: no clubbing, no cyanosis,        LABS:                          7.6    10.41 )-----------( 258      ( 22 Nov 2023 09:19 )             26.7       WBC  10.41 11-22 @ 09:19  11.64 11-21 @ 06:40  14.44 11-20 @ 09:20      11-22    140  |  103  |  16  ----------------------------<  237<H>  4.0   |  29  |  0.99    Ca    8.6      22 Nov 2023 09:19    TPro  6.7  /  Alb  3.1<L>  /  TBili  0.5  /  DBili  0.2  /  AST  51<H>  /  ALT  89<H>  /  AlkPhos  86  11-21      Creatinine: 0.99 mg/dL (11-22-23 @ 09:19)  Creatinine: 0.76 mg/dL (11-21-23 @ 06:40)  Creatinine: 1.00 mg/dL (11-20-23 @ 09:20)      PT/INR - ( 21 Nov 2023 06:40 )   PT: 13.4 sec;   INR: 1.15 ratio         PTT - ( 21 Nov 2023 06:40 )  PTT:29.3 sec  Urinalysis Basic - ( 22 Nov 2023 09:19 )    Color: x / Appearance: x / SG: x / pH: x  Gluc: 237 mg/dL / Ketone: x  / Bili: x / Urobili: x   Blood: x / Protein: x / Nitrite: x   Leuk Esterase: x / RBC: x / WBC x   Sq Epi: x / Non Sq Epi: x / Bacteria: x            INFLAMMATORY MARKERS      MICROBIOLOGY:              RADIOLOGY & ADDITIONAL STUDIES:

## 2023-11-22 NOTE — PROGRESS NOTE ADULT - PROBLEM SELECTOR PLAN 5
Pt has had unchanged medication regiment x10-15 years  -Continue aripiprazole, sertraline, trazodone, buproprion  -Lorazepam daily dosing 1.5mg am and qhs, 1mg at lunch  -QTC wnl

## 2023-11-22 NOTE — CONSULT NOTE ADULT - PROBLEM SELECTOR RECOMMENDATION 9
Type 2 A1c 6.5% adm PNA  Recommend endocrine-Perlman onconsult  FU appt: TBA  DSC recommendations: return to home lifestyle and diet modifications  diabetes education provided as documented above  Diabetes support info and cell # [phone] given   Goal 100-180 mg/dL; 140-180 mg/dL in critical care areas

## 2023-11-22 NOTE — PHARMACOTHERAPY INTERVENTION NOTE - COMMENTS
Per Safe Rx for Patients 65 years and Older Report, patient is ordered for lorazepam 1 mg QD. Discussed adding hold parameters to hold for sedation, lethargy or RR <12 with Dr. Fitzgerald. Recommendation accepted and order updated to reflect parameters.

## 2023-11-22 NOTE — PROGRESS NOTE ADULT - PROBLEM SELECTOR PLAN 1
h/o bronchitis 1 month ago treated with two rounds of abx and steroids  -CT Chest: Findings concerning for multifocal pneumonia, most prominent in the right  upper lobe. Slightly enlarged mediastinal and right hilar lymph nodes, nonspecific and possibly reactive.  -WBC 14.44, Neutrophils 83.5%  -Continue Rocephin, Azithromycin w/ presumed CAP  - f/u urine legionella and strep pneumo  -Monitor for fever, trend WBC  -Dr Funes consulted  -Dr Mart consulted h/o bronchitis 1 month ago treated with two rounds of abx and steroids  -CT Chest: Findings concerning for multifocal pneumonia, most prominent in the right  upper lobe. Slightly enlarged mediastinal and right hilar lymph nodes, nonspecific and possibly reactive.  -WBC 14.44, Neutrophils 83.5%  -Continue Rocephin, Azithromycin w/ presumed CAP  - f/u urine legionella    -Monitor for fever, trend WBC  -Dr Funes consulted  -Dr Mart consulted h/o bronchitis 1 month ago treated with two rounds of abx and steroids  -CT Chest: Findings concerning for multifocal pneumonia, most prominent in the right upper lobe. Slightly enlarged mediastinal and right hilar lymph nodes, nonspecific and possibly reactive.  -WBC 14.44, Neutrophils 83.5% on admission -- leukocytosis now resolving  -Continue Rocephin; completed 3-day course of Azithromycin  -taper off NC  -Monitor for fever, trend WBC  -pulm, Dr Funes consulted, recs appreciated  -ID, Dr Mart consulted recs appreciated

## 2023-11-22 NOTE — PROGRESS NOTE ADULT - PROBLEM SELECTOR PLAN 2
h/o iron deficiency anemia w/ multiple iron transfusions  and iron infusion in his past  -colonoscopy 2 years ago w/ polyps and diverticula; father history colon cancer  -HgB dropped to 7.1 from 7.8  -low iron saturation, s/w iron tablets TID,   -transfuse for HgB <7 or if patient symptomatic  -on [facility] in Jan 2022 patient had a Hgb of 13.1 and today it is 7.8  -f/u FOBT- no active bleed noticed

## 2023-11-22 NOTE — PROGRESS NOTE ADULT - SUBJECTIVE AND OBJECTIVE BOX
Date/Time Patient Seen:  		  Referring MD:   Data Reviewed	       Patient is a 68y old  Male who presents with a chief complaint of Pneumonia (22 Nov 2023 13:26)      Subjective/HPI     PAST MEDICAL & SURGICAL HISTORY:  Hypertension    High cholesterol    COPD (chronic obstructive pulmonary disease)    Asthma    GERD (gastroesophageal reflux disease)    Chronic rhinosinusitis    PTSD (post-traumatic stress disorder)  September 11th 2001    Osteoarthrosis, localized    Bakers cyst  Bilateral    Sleep apnea  does not use CPAP machine    Prediabetes    Iron deficiency anemia    S/P knee replacement, right    S/P tonsillectomy and adenoidectomy    S/P wrist surgery  ganglion cyst    S/P arthroscopic surgery of left knee    Bakers cyst  Bilateral removal Bakers Cyst          Medication list         MEDICATIONS  (STANDING):  albuterol/ipratropium for Nebulization 3 milliLiter(s) Nebulizer every 6 hours  amLODIPine   Tablet 10 milliGRAM(s) Oral daily  ARIPiprazole 20 milliGRAM(s) Oral daily  atorvastatin 20 milliGRAM(s) Oral at bedtime  budesonide 160 MICROgram(s)/formoterol 4.5 MICROgram(s) Inhaler 2 Puff(s) Inhalation two times a day  buPROPion XL (24-Hour) . 300 milliGRAM(s) Oral daily  cefTRIAXone   IVPB 1000 milliGRAM(s) IV Intermittent every 24 hours  dextrose 5%. 1000 milliLiter(s) (50 mL/Hr) IV Continuous <Continuous>  dextrose 5%. 1000 milliLiter(s) (100 mL/Hr) IV Continuous <Continuous>  dextrose 50% Injectable 12.5 Gram(s) IV Push once  dextrose 50% Injectable 25 Gram(s) IV Push once  dextrose 50% Injectable 25 Gram(s) IV Push once  famotidine    Tablet 20 milliGRAM(s) Oral two times a day  glucagon  Injectable 1 milliGRAM(s) IntraMuscular once  insulin lispro (ADMELOG) corrective regimen sliding scale   SubCutaneous three times a day before meals  insulin lispro (ADMELOG) corrective regimen sliding scale   SubCutaneous at bedtime  iron sucrose Injectable 100 milliGRAM(s) IV Push every 24 hours  LORazepam     Tablet 1.5 milliGRAM(s) Oral <User Schedule>  LORazepam     Tablet 1 milliGRAM(s) Oral <User Schedule>  pantoprazole    Tablet 40 milliGRAM(s) Oral every 12 hours  polyethylene glycol 3350 17 Gram(s) Oral two times a day  predniSONE   Tablet 20 milliGRAM(s) Oral daily  sertraline 200 milliGRAM(s) Oral daily  traZODone 150 milliGRAM(s) Oral at bedtime    MEDICATIONS  (PRN):  acetaminophen     Tablet .. 650 milliGRAM(s) Oral every 6 hours PRN Temp greater or equal to 38C (100.4F), Mild Pain (1 - 3)  albuterol    90 MICROgram(s) HFA Inhaler 2 Puff(s) Inhalation every 6 hours PRN for bronchospasm  aluminum hydroxide/magnesium hydroxide/simethicone Suspension 30 milliLiter(s) Oral every 4 hours PRN Dyspepsia  dextrose Oral Gel 15 Gram(s) Oral once PRN Blood Glucose LESS THAN 70 milliGRAM(s)/deciliter  guaiFENesin Oral Liquid (Sugar-Free) 200 milliGRAM(s) Oral every 6 hours PRN Cough  melatonin 3 milliGRAM(s) Oral at bedtime PRN Insomnia  ondansetron Injectable 4 milliGRAM(s) IV Push every 8 hours PRN Nausea and/or Vomiting         Vitals log        ICU Vital Signs Last 24 Hrs  T(C): 36.8 (22 Nov 2023 11:44), Max: 36.8 (22 Nov 2023 11:44)  T(F): 98.3 (22 Nov 2023 11:44), Max: 98.3 (22 Nov 2023 11:44)  HR: 73 (22 Nov 2023 11:44) (73 - 88)  BP: 159/86 (22 Nov 2023 11:44) (150/75 - 168/74)  BP(mean): --  ABP: --  ABP(mean): --  RR: 18 (22 Nov 2023 11:44) (18 - 19)  SpO2: 98% (22 Nov 2023 11:44) (93% - 98%)    O2 Parameters below as of 22 Nov 2023 11:44  Patient On (Oxygen Delivery Method): nasal cannula  O2 Flow (L/min): 3               Input and Output:  I&O's Detail    21 Nov 2023 07:01  -  22 Nov 2023 07:00  --------------------------------------------------------  IN:    IV PiggyBack: 50 mL    Oral Fluid: 880 mL  Total IN: 930 mL    OUT:    Voided (mL): 400 mL  Total OUT: 400 mL    Total NET: 530 mL          Lab Data                        7.6    10.41 )-----------( 258      ( 22 Nov 2023 09:19 )             26.7     11-22    140  |  103  |  16  ----------------------------<  237<H>  4.0   |  29  |  0.99    Ca    8.6      22 Nov 2023 09:19    TPro  6.7  /  Alb  3.1<L>  /  TBili  0.5  /  DBili  0.2  /  AST  51<H>  /  ALT  89<H>  /  AlkPhos  86  11-21            Review of Systems	      Objective     Physical Examination    heart s1s2  lung dc BS  head nc  obese  tattoos      Pertinent Lab findings & Imaging      Dino:  NO   Adequate UO     I&O's Detail    21 Nov 2023 07:01  -  22 Nov 2023 07:00  --------------------------------------------------------  IN:    IV PiggyBack: 50 mL    Oral Fluid: 880 mL  Total IN: 930 mL    OUT:    Voided (mL): 400 mL  Total OUT: 400 mL    Total NET: 530 mL               Discussed with:     Cultures:	        Radiology

## 2023-11-22 NOTE — CONSULT NOTE ADULT - SUBJECTIVE AND OBJECTIVE BOX
Patient is a 68y old  Male who presents with a chief complaint of Pneumonia (22 Nov 2023 13:47)    Type 2 DM, was prediabetes for many years, COPD exacerbation on steroids and antibiotics. discussed pathophysiology and diagnostic criteria of T2DM. managed by Dr. Lázaro Gale, seen q6 months, current a1c 6.5%. no Rx @ home, reviewed CC diet, carb identificaiton and portion control, balanced plate method and prioritizing nonstarchy vegetables and proteins. discussed 150min/week mod intensity exercise. rec lifestyle and dietary changes, no need for medication at this time. verbal education and handouts provided. questions answered  diabetes education provided- A1c measure and BG targets  fasting, <180 2 hours postmeal. medication MOA and considerations/side effects, inhospital BGM frequency and insulin administration, s/s of hyperglycemia/hypoglycemia and management, glycemic control and preventing complications, consistent carb diet, balanced plate method, consistent meal planning. sick day management, provider f/u  Hx COPD, HLD, Hypertriglyceridemia, BRIAN    HPI:  69 yo male w/ pmh COPD, HTN, HLD, Hypertriglyceridemia, BRIAN, Prediabetes, Anxiety/Depression, PTSD, GERD, BOOKER presents with weakness x2 weeks. HPI aided by daughter as patient is a poor historian. States he had bronchitis about 1 month ago that was treated outpatient with "two rounds of antibiotics" and steroids. The patient went hunting 2 days ago [facility] and states that he fell asleep multiple times while on the trip. Denies any tick bites within the past month. Pt states that he has had an occasional cough, not increased from his baseline cough with his COPD. Endorses having to receive iron supplementation in the past due to his BRIAN. Pt denies any fever, chills, sob, cp, palpitations, dizziness, arthralgia, vision change, abd pain, n/v/d/c.    ED course  Vitals: T 97.6, HR 71, /94, RR 18, SpO2 99% on RA  Significant labs: WBC 14.44, Neutrohil 83.5%, HgB/Hct 7.8/27.4, AST 69, , ABG: ph 7.40, pCO2 58, pO2 92, HCO3 36, Base Excess, Arterial 11.1, Oxygen Saturation 99.1  Imaging: CT Chest: Findings concerning for multifocal pneumonia, most prominent in the right upper lobe.Slightly enlarged mediastinal and right hilar lymph nodes, nonspecific and possibly reactive.  EKG: pending  In ED patient given: Azithromycin, Ceftriaxone   (20 Nov 2023 13:03)      PAST MEDICAL & SURGICAL HISTORY:  Hypertension      High cholesterol      COPD (chronic obstructive pulmonary disease)      GERD (gastroesophageal reflux disease)      PTSD (post-traumatic stress disorder)  September 11th 2001      Osteoarthrosis, localized      Sleep apnea  does not use CPAP machine      Prediabetes      Iron deficiency anemia      S/P knee replacement, right      S/P tonsillectomy and adenoidectomy      S/P wrist surgery  ganglion cyst      S/P arthroscopic surgery of left knee  Allergies    No Known Drug Allergies  latex (Rash)    Intolerances        MEDICATIONS  (STANDING):  albuterol/ipratropium for Nebulization 3 milliLiter(s) Nebulizer every 6 hours  amLODIPine   Tablet 10 milliGRAM(s) Oral daily  ARIPiprazole 20 milliGRAM(s) Oral daily  atorvastatin 20 milliGRAM(s) Oral at bedtime  budesonide 160 MICROgram(s)/formoterol 4.5 MICROgram(s) Inhaler 2 Puff(s) Inhalation two times a day  buPROPion XL (24-Hour) . 300 milliGRAM(s) Oral daily  cefTRIAXone   IVPB 1000 milliGRAM(s) IV Intermittent every 24 hours  dextrose 5%. 1000 milliLiter(s) (50 mL/Hr) IV Continuous <Continuous>  dextrose 5%. 1000 milliLiter(s) (100 mL/Hr) IV Continuous <Continuous>  dextrose 50% Injectable 12.5 Gram(s) IV Push once  dextrose 50% Injectable 25 Gram(s) IV Push once  dextrose 50% Injectable 25 Gram(s) IV Push once  famotidine    Tablet 20 milliGRAM(s) Oral two times a day  glucagon  Injectable 1 milliGRAM(s) IntraMuscular once  insulin lispro (ADMELOG) corrective regimen sliding scale   SubCutaneous three times a day before meals  insulin lispro (ADMELOG) corrective regimen sliding scale   SubCutaneous at bedtime  iron sucrose Injectable 100 milliGRAM(s) IV Push every 24 hours  LORazepam     Tablet 1 milliGRAM(s) Oral <User Schedule>  LORazepam     Tablet 1.5 milliGRAM(s) Oral <User Schedule>  pantoprazole    Tablet 40 milliGRAM(s) Oral every 12 hours  polyethylene glycol 3350 17 Gram(s) Oral two times a day  predniSONE   Tablet 20 milliGRAM(s) Oral daily  sertraline 200 milliGRAM(s) Oral daily  traZODone 150 milliGRAM(s) Oral at bedtime

## 2023-11-22 NOTE — PROGRESS NOTE ADULT - SUBJECTIVE AND OBJECTIVE BOX
Patient is a 68y old  Male who presents with a chief complaint of Pneumonia (22 Nov 2023 05:42)      INTERVAL HPI/OVERNIGHT EVENTS: Pt was seen and examined at bedside. No acute overnight events. Pt was examined at bedside. No acute overnight events. Pt states that he is doing well and has no complaints. He has a intermittent cough that has improved from yesterday.    Pt denies headache, dizziness, lightheadedness, fever, chills, body aches, CP, SOB, palpitations, abdominal pain, n/v, numbness/tingling.  No other complaints at this time.     MEDICATIONS  (STANDING):  albuterol/ipratropium for Nebulization 3 milliLiter(s) Nebulizer every 6 hours  amLODIPine   Tablet 10 milliGRAM(s) Oral daily  ARIPiprazole 20 milliGRAM(s) Oral daily  atorvastatin 20 milliGRAM(s) Oral at bedtime  budesonide 160 MICROgram(s)/formoterol 4.5 MICROgram(s) Inhaler 2 Puff(s) Inhalation two times a day  buPROPion XL (24-Hour) . 300 milliGRAM(s) Oral daily  cefTRIAXone   IVPB 1000 milliGRAM(s) IV Intermittent every 24 hours  dextrose 5%. 1000 milliLiter(s) (100 mL/Hr) IV Continuous <Continuous>  dextrose 5%. 1000 milliLiter(s) (50 mL/Hr) IV Continuous <Continuous>  dextrose 50% Injectable 25 Gram(s) IV Push once  dextrose 50% Injectable 25 Gram(s) IV Push once  dextrose 50% Injectable 12.5 Gram(s) IV Push once  famotidine    Tablet 20 milliGRAM(s) Oral two times a day  glucagon  Injectable 1 milliGRAM(s) IntraMuscular once  insulin lispro (ADMELOG) corrective regimen sliding scale   SubCutaneous three times a day before meals  insulin lispro (ADMELOG) corrective regimen sliding scale   SubCutaneous at bedtime  iron sucrose Injectable 100 milliGRAM(s) IV Push every 24 hours  LORazepam     Tablet 1.5 milliGRAM(s) Oral <User Schedule>  LORazepam     Tablet 1 milliGRAM(s) Oral <User Schedule>  pantoprazole    Tablet 40 milliGRAM(s) Oral every 12 hours  polyethylene glycol 3350 17 Gram(s) Oral two times a day  predniSONE   Tablet 20 milliGRAM(s) Oral daily  sertraline 200 milliGRAM(s) Oral daily  traZODone 150 milliGRAM(s) Oral at bedtime    MEDICATIONS  (PRN):  acetaminophen     Tablet .. 650 milliGRAM(s) Oral every 6 hours PRN Temp greater or equal to 38C (100.4F), Mild Pain (1 - 3)  albuterol    90 MICROgram(s) HFA Inhaler 2 Puff(s) Inhalation every 6 hours PRN for bronchospasm  aluminum hydroxide/magnesium hydroxide/simethicone Suspension 30 milliLiter(s) Oral every 4 hours PRN Dyspepsia  dextrose Oral Gel 15 Gram(s) Oral once PRN Blood Glucose LESS THAN 70 milliGRAM(s)/deciliter  guaiFENesin Oral Liquid (Sugar-Free) 200 milliGRAM(s) Oral every 6 hours PRN Cough  melatonin 3 milliGRAM(s) Oral at bedtime PRN Insomnia  ondansetron Injectable 4 milliGRAM(s) IV Push every 8 hours PRN Nausea and/or Vomiting      Allergies    No Known Drug Allergies  latex (Rash)    Intolerances        REVIEW OF SYSTEMS:  CONSTITUTIONAL: No fever or chills  HEENT:  No headache, no sore throat  RESPIRATORY: Admits to cough. Denies wheezing, or shortness of breath  CARDIOVASCULAR: No chest pain, palpitations  GASTROINTESTINAL: No abd pain, nausea, vomiting, or diarrhea  GENITOURINARY: No dysuria, frequency, or hematuria  NEUROLOGICAL: no focal weakness or dizziness  MUSCULOSKELETAL: no myalgias     Vital Signs Last 24 Hrs  T(C): 36.8 (22 Nov 2023 11:44), Max: 36.8 (22 Nov 2023 11:44)  T(F): 98.3 (22 Nov 2023 11:44), Max: 98.3 (22 Nov 2023 11:44)  HR: 73 (22 Nov 2023 11:44) (73 - 88)  BP: 159/86 (22 Nov 2023 11:44) (150/75 - 168/74)  BP(mean): --  RR: 18 (22 Nov 2023 11:44) (18 - 19)  SpO2: 98% (22 Nov 2023 11:44) (93% - 98%)    Parameters below as of 22 Nov 2023 11:44  Patient On (Oxygen Delivery Method): nasal cannula  O2 Flow (L/min): 3      PHYSICAL EXAM:  GENERAL: NAD  HEENT:  anicteric, moist mucous membranes  CHEST/LUNG:  CTA b/l, no rales, wheezes, or rhonchi  HEART:  RRR, S1, S2  ABDOMEN:  BS+, soft, nontender, nondistended  EXTREMITIES: no edema, cyanosis, or calf tenderness  NERVOUS SYSTEM: answers questions and follows commands appropriately    LABS:                        7.6    10.41 )-----------( 258      ( 22 Nov 2023 09:19 )             26.7     CBC Full  -  ( 22 Nov 2023 09:19 )  WBC Count : 10.41 K/uL  Hemoglobin : 7.6 g/dL  Hematocrit : 26.7 %  Platelet Count - Automated : 258 K/uL  Mean Cell Volume : 76.5 fl  Mean Cell Hemoglobin : 21.8 pg  Mean Cell Hemoglobin Concentration : 28.5 gm/dL  Auto Neutrophil # : 8.86 K/uL  Auto Lymphocyte # : 0.72 K/uL  Auto Monocyte # : 0.50 K/uL  Auto Eosinophil # : 0.14 K/uL  Auto Basophil # : 0.04 K/uL  Auto Neutrophil % : 85.2 %  Auto Lymphocyte % : 6.9 %  Auto Monocyte % : 4.8 %  Auto Eosinophil % : 1.3 %  Auto Basophil % : 0.4 %    22 Nov 2023 09:19    140    |  103    |  16     ----------------------------<  237    4.0     |  29     |  0.99     Ca    8.6        22 Nov 2023 09:19      PT/INR - ( 21 Nov 2023 06:40 )   PT: 13.4 sec;   INR: 1.15 ratio         PTT - ( 21 Nov 2023 06:40 )  PTT:29.3 sec  Urinalysis Basic - ( 22 Nov 2023 09:19 )    Color: x / Appearance: x / SG: x / pH: x  Gluc: 237 mg/dL / Ketone: x  / Bili: x / Urobili: x   Blood: x / Protein: x / Nitrite: x   Leuk Esterase: x / RBC: x / WBC x   Sq Epi: x / Non Sq Epi: x / Bacteria: x      CAPILLARY BLOOD GLUCOSE            Culture - Blood (collected 11-20-23 @ 09:20)  Source: .Blood Blood-Peripheral  Preliminary Report (11-22-23 @ 12:01):    No growth at 48 Hours    Culture - Blood (collected 11-20-23 @ 09:15)  Source: .Blood Blood-Peripheral  Preliminary Report (11-22-23 @ 12:01):    No growth at 48 Hours        RADIOLOGY & ADDITIONAL TESTS: ____    Personally reviewed.     Consultant(s) Notes Reviewed:  [x] YES  [ ] NO     Patient is a 68y old  Male who presents with a chief complaint of SOB, cough.      INTERVAL HPI/OVERNIGHT EVENTS: Pt was seen and examined at bedside. No acute overnight events. Pt states that he is feeling better. He has an intermittent cough, that has improved from yesterday. SOB much improved. Pt denies headache, dizziness, lightheadedness, fever, chills, CP, palpitations, abdominal pain, n/v, numbness/tingling.    MEDICATIONS  (STANDING):  albuterol/ipratropium for Nebulization 3 milliLiter(s) Nebulizer every 6 hours  amLODIPine   Tablet 10 milliGRAM(s) Oral daily  ARIPiprazole 20 milliGRAM(s) Oral daily  atorvastatin 20 milliGRAM(s) Oral at bedtime  budesonide 160 MICROgram(s)/formoterol 4.5 MICROgram(s) Inhaler 2 Puff(s) Inhalation two times a day  buPROPion XL (24-Hour) . 300 milliGRAM(s) Oral daily  cefTRIAXone   IVPB 1000 milliGRAM(s) IV Intermittent every 24 hours  dextrose 5%. 1000 milliLiter(s) (100 mL/Hr) IV Continuous <Continuous>  dextrose 5%. 1000 milliLiter(s) (50 mL/Hr) IV Continuous <Continuous>  dextrose 50% Injectable 25 Gram(s) IV Push once  dextrose 50% Injectable 25 Gram(s) IV Push once  dextrose 50% Injectable 12.5 Gram(s) IV Push once  famotidine    Tablet 20 milliGRAM(s) Oral two times a day  glucagon  Injectable 1 milliGRAM(s) IntraMuscular once  insulin lispro (ADMELOG) corrective regimen sliding scale   SubCutaneous three times a day before meals  insulin lispro (ADMELOG) corrective regimen sliding scale   SubCutaneous at bedtime  iron sucrose Injectable 100 milliGRAM(s) IV Push every 24 hours  LORazepam     Tablet 1.5 milliGRAM(s) Oral <User Schedule>  LORazepam     Tablet 1 milliGRAM(s) Oral <User Schedule>  pantoprazole    Tablet 40 milliGRAM(s) Oral every 12 hours  polyethylene glycol 3350 17 Gram(s) Oral two times a day  predniSONE   Tablet 20 milliGRAM(s) Oral daily  sertraline 200 milliGRAM(s) Oral daily  traZODone 150 milliGRAM(s) Oral at bedtime    MEDICATIONS  (PRN):  acetaminophen     Tablet .. 650 milliGRAM(s) Oral every 6 hours PRN Temp greater or equal to 38C (100.4F), Mild Pain (1 - 3)  albuterol    90 MICROgram(s) HFA Inhaler 2 Puff(s) Inhalation every 6 hours PRN for bronchospasm  aluminum hydroxide/magnesium hydroxide/simethicone Suspension 30 milliLiter(s) Oral every 4 hours PRN Dyspepsia  dextrose Oral Gel 15 Gram(s) Oral once PRN Blood Glucose LESS THAN 70 milliGRAM(s)/deciliter  guaiFENesin Oral Liquid (Sugar-Free) 200 milliGRAM(s) Oral every 6 hours PRN Cough  melatonin 3 milliGRAM(s) Oral at bedtime PRN Insomnia  ondansetron Injectable 4 milliGRAM(s) IV Push every 8 hours PRN Nausea and/or Vomiting      Allergies    No Known Drug Allergies  latex (Rash)    Intolerances        REVIEW OF SYSTEMS:  CONSTITUTIONAL: No fever or chills  HEENT:  No headache, no sore throat  RESPIRATORY: Admits to cough. Denies wheezing, or shortness of breath (Resolving)  CARDIOVASCULAR: No chest pain, palpitations  GASTROINTESTINAL: No abd pain, nausea, vomiting, or diarrhea  GENITOURINARY: No dysuria, frequency, or hematuria  NEUROLOGICAL: no focal weakness or dizziness  MUSCULOSKELETAL: no myalgias     Vital Signs Last 24 Hrs  T(C): 36.8 (22 Nov 2023 11:44), Max: 36.8 (22 Nov 2023 11:44)  T(F): 98.3 (22 Nov 2023 11:44), Max: 98.3 (22 Nov 2023 11:44)  HR: 73 (22 Nov 2023 11:44) (73 - 88)  BP: 159/86 (22 Nov 2023 11:44) (150/75 - 168/74)  BP(mean): --  RR: 18 (22 Nov 2023 11:44) (18 - 19)  SpO2: 98% (22 Nov 2023 11:44) (93% - 98%)    Parameters below as of 22 Nov 2023 11:44  Patient On (Oxygen Delivery Method): nasal cannula  O2 Flow (L/min): 3      PHYSICAL EXAM:  GENERAL: NAD at rest on 3L NC  HEENT:  anicteric, moist mucous membranes  CHEST/LUNG:  decreased breath sounds, grossly CTA b/l; no rales, wheezes, or rhonchi appreciated  HEART:  RRR, S1, S2  ABDOMEN:  BS+, soft, nontender, nondistended  EXTREMITIES: no edema, cyanosis, or calf tenderness  NERVOUS SYSTEM: answers questions and follows commands appropriately    LABS:                        7.6    10.41 )-----------( 258      ( 22 Nov 2023 09:19 )             26.7     CBC Full  -  ( 22 Nov 2023 09:19 )  WBC Count : 10.41 K/uL  Hemoglobin : 7.6 g/dL  Hematocrit : 26.7 %  Platelet Count - Automated : 258 K/uL  Mean Cell Volume : 76.5 fl  Mean Cell Hemoglobin : 21.8 pg  Mean Cell Hemoglobin Concentration : 28.5 gm/dL  Auto Neutrophil # : 8.86 K/uL  Auto Lymphocyte # : 0.72 K/uL  Auto Monocyte # : 0.50 K/uL  Auto Eosinophil # : 0.14 K/uL  Auto Basophil # : 0.04 K/uL  Auto Neutrophil % : 85.2 %  Auto Lymphocyte % : 6.9 %  Auto Monocyte % : 4.8 %  Auto Eosinophil % : 1.3 %  Auto Basophil % : 0.4 %    22 Nov 2023 09:19    140    |  103    |  16     ----------------------------<  237    4.0     |  29     |  0.99     Ca    8.6        22 Nov 2023 09:19      PT/INR - ( 21 Nov 2023 06:40 )   PT: 13.4 sec;   INR: 1.15 ratio         PTT - ( 21 Nov 2023 06:40 )  PTT:29.3 sec  Urinalysis Basic - ( 22 Nov 2023 09:19 )    Color: x / Appearance: x / SG: x / pH: x  Gluc: 237 mg/dL / Ketone: x  / Bili: x / Urobili: x   Blood: x / Protein: x / Nitrite: x   Leuk Esterase: x / RBC: x / WBC x   Sq Epi: x / Non Sq Epi: x / Bacteria: x      CAPILLARY BLOOD GLUCOSE            Culture - Blood (collected 11-20-23 @ 09:20)  Source: .Blood Blood-Peripheral  Preliminary Report (11-22-23 @ 12:01):    No growth at 48 Hours    Culture - Blood (collected 11-20-23 @ 09:15)  Source: .Blood Blood-Peripheral  Preliminary Report (11-22-23 @ 12:01):    No growth at 48 Hours        RADIOLOGY & ADDITIONAL TESTS: ____    Personally reviewed.     Consultant(s) Notes Reviewed:  [x] YES  [ ] NO

## 2023-11-22 NOTE — PROGRESS NOTE ADULT - SUBJECTIVE AND OBJECTIVE BOX
Date/Time Patient Seen:  		  Referring MD:   Data Reviewed	       Patient is a 68y old  Male who presents with a chief complaint of Pneumonia (21 Nov 2023 13:02)      Subjective/HPI     PAST MEDICAL & SURGICAL HISTORY:  Hypertension    High cholesterol    COPD (chronic obstructive pulmonary disease)    Asthma    GERD (gastroesophageal reflux disease)    Chronic rhinosinusitis    PTSD (post-traumatic stress disorder)  September 11th 2001    Osteoarthrosis, localized    Bakers cyst  Bilateral    Sleep apnea  does not use CPAP machine    Prediabetes    Iron deficiency anemia    S/P knee replacement, right    S/P tonsillectomy and adenoidectomy    S/P wrist surgery  ganglion cyst    S/P arthroscopic surgery of left knee    Bakers cyst  Bilateral removal Bakers Cyst          Medication list         MEDICATIONS  (STANDING):  albuterol/ipratropium for Nebulization 3 milliLiter(s) Nebulizer every 6 hours  amLODIPine   Tablet 10 milliGRAM(s) Oral daily  ARIPiprazole 20 milliGRAM(s) Oral daily  atorvastatin 20 milliGRAM(s) Oral at bedtime  azithromycin   Tablet 500 milliGRAM(s) Oral daily  budesonide 160 MICROgram(s)/formoterol 4.5 MICROgram(s) Inhaler 2 Puff(s) Inhalation two times a day  buPROPion XL (24-Hour) . 300 milliGRAM(s) Oral daily  cefTRIAXone   IVPB 1000 milliGRAM(s) IV Intermittent every 24 hours  famotidine    Tablet 20 milliGRAM(s) Oral two times a day  LORazepam     Tablet 1 milliGRAM(s) Oral <User Schedule>  LORazepam     Tablet 1.5 milliGRAM(s) Oral <User Schedule>  pantoprazole    Tablet 40 milliGRAM(s) Oral every 12 hours  polyethylene glycol 3350 17 Gram(s) Oral two times a day  predniSONE   Tablet 20 milliGRAM(s) Oral daily  sertraline 200 milliGRAM(s) Oral daily  traZODone 150 milliGRAM(s) Oral at bedtime    MEDICATIONS  (PRN):  acetaminophen     Tablet .. 650 milliGRAM(s) Oral every 6 hours PRN Temp greater or equal to 38C (100.4F), Mild Pain (1 - 3)  albuterol    90 MICROgram(s) HFA Inhaler 2 Puff(s) Inhalation every 6 hours PRN for bronchospasm  aluminum hydroxide/magnesium hydroxide/simethicone Suspension 30 milliLiter(s) Oral every 4 hours PRN Dyspepsia  guaiFENesin Oral Liquid (Sugar-Free) 200 milliGRAM(s) Oral every 6 hours PRN Cough  melatonin 3 milliGRAM(s) Oral at bedtime PRN Insomnia  ondansetron Injectable 4 milliGRAM(s) IV Push every 8 hours PRN Nausea and/or Vomiting         Vitals log        ICU Vital Signs Last 24 Hrs  T(C): 36.6 (22 Nov 2023 04:51), Max: 36.6 (21 Nov 2023 20:03)  T(F): 97.8 (22 Nov 2023 04:51), Max: 97.8 (21 Nov 2023 20:03)  HR: 88 (22 Nov 2023 04:51) (80 - 92)  BP: 150/75 (22 Nov 2023 04:51) (150/75 - 168/74)  BP(mean): --  ABP: --  ABP(mean): --  RR: 18 (22 Nov 2023 04:51) (18 - 19)  SpO2: 93% (22 Nov 2023 04:51) (93% - 97%)    O2 Parameters below as of 22 Nov 2023 04:51  Patient On (Oxygen Delivery Method): nasal cannula  O2 Flow (L/min): 3               Input and Output:  I&O's Detail    21 Nov 2023 07:01  -  22 Nov 2023 05:42  --------------------------------------------------------  IN:    Oral Fluid: 580 mL  Total IN: 580 mL    OUT:    Voided (mL): 400 mL  Total OUT: 400 mL    Total NET: 180 mL          Lab Data                        7.1    11.64 )-----------( 238      ( 21 Nov 2023 06:40 )             23.8     11-21    140  |  105  |  20  ----------------------------<  127<H>  4.0   |  33<H>  |  0.76    Ca    8.0<L>      21 Nov 2023 06:40    TPro  6.7  /  Alb  3.1<L>  /  TBili  0.5  /  DBili  0.2  /  AST  51<H>  /  ALT  89<H>  /  AlkPhos  86  11-21    ABG - ( 20 Nov 2023 11:00 )  pH, Arterial: 7.40  pH, Blood: x     /  pCO2: 58    /  pO2: 92    / HCO3: 36    / Base Excess: 11.1  /  SaO2: 99.1                    Review of Systems	      Objective     Physical Examination    heart s1s2  lung dc bS  head nc      Pertinent Lab findings & Imaging      Dino:  NO   Adequate UO     I&O's Detail    21 Nov 2023 07:01  -  22 Nov 2023 05:42  --------------------------------------------------------  IN:    Oral Fluid: 580 mL  Total IN: 580 mL    OUT:    Voided (mL): 400 mL  Total OUT: 400 mL    Total NET: 180 mL               Discussed with:     Cultures:	        Radiology                             Patient is a 68y old  Male who presents with a chief complaint of Pneumonia (22 Nov 2023 05:42)      INTERVAL HPI/OVERNIGHT EVENTS: Pt was seen and examined at bedside. No acute overnight events. Pt states that he is doing well and has no complaints. He has a intermittent cough that has improved from yesterday.  Pt denies headache, dizziness, lightheadedness, fever, chills, body aches, CP, SOB, palpitations, abdominal pain, n/v, numbness/tingling.  No other complaints at this time.     MEDICATIONS  (STANDING):  albuterol/ipratropium for Nebulization 3 milliLiter(s) Nebulizer every 6 hours  amLODIPine   Tablet 10 milliGRAM(s) Oral daily  ARIPiprazole 20 milliGRAM(s) Oral daily  atorvastatin 20 milliGRAM(s) Oral at bedtime  azithromycin   Tablet 500 milliGRAM(s) Oral daily  budesonide 160 MICROgram(s)/formoterol 4.5 MICROgram(s) Inhaler 2 Puff(s) Inhalation two times a day  buPROPion XL (24-Hour) . 300 milliGRAM(s) Oral daily  cefTRIAXone   IVPB 1000 milliGRAM(s) IV Intermittent every 24 hours  famotidine    Tablet 20 milliGRAM(s) Oral two times a day  LORazepam     Tablet 1.5 milliGRAM(s) Oral <User Schedule>  LORazepam     Tablet 1 milliGRAM(s) Oral <User Schedule>  pantoprazole    Tablet 40 milliGRAM(s) Oral every 12 hours  polyethylene glycol 3350 17 Gram(s) Oral two times a day  predniSONE   Tablet 20 milliGRAM(s) Oral daily  sertraline 200 milliGRAM(s) Oral daily  traZODone 150 milliGRAM(s) Oral at bedtime    MEDICATIONS  (PRN):  acetaminophen     Tablet .. 650 milliGRAM(s) Oral every 6 hours PRN Temp greater or equal to 38C (100.4F), Mild Pain (1 - 3)  albuterol    90 MICROgram(s) HFA Inhaler 2 Puff(s) Inhalation every 6 hours PRN for bronchospasm  aluminum hydroxide/magnesium hydroxide/simethicone Suspension 30 milliLiter(s) Oral every 4 hours PRN Dyspepsia  guaiFENesin Oral Liquid (Sugar-Free) 200 milliGRAM(s) Oral every 6 hours PRN Cough  melatonin 3 milliGRAM(s) Oral at bedtime PRN Insomnia  ondansetron Injectable 4 milliGRAM(s) IV Push every 8 hours PRN Nausea and/or Vomiting      Allergies    No Known Drug Allergies  latex (Rash)    Intolerances        REVIEW OF SYSTEMS:  CONSTITUTIONAL: No fever or chills  HEENT:  No headache, no sore throat  RESPIRATORY: admits to cough. Denies wheezing and shortness of breath  CARDIOVASCULAR: No chest pain, palpitations  GASTROINTESTINAL: No abd pain, nausea, vomiting, or diarrhea  GENITOURINARY: No dysuria, frequency, or hematuria  NEUROLOGICAL: no focal weakness or dizziness  MUSCULOSKELETAL: no myalgias     Vital Signs Last 24 Hrs  T(C): 36.6 (22 Nov 2023 04:51), Max: 36.6 (21 Nov 2023 20:03)  T(F): 97.8 (22 Nov 2023 04:51), Max: 97.8 (21 Nov 2023 20:03)  HR: 77 (22 Nov 2023 08:37) (77 - 88)  BP: 150/75 (22 Nov 2023 04:51) (150/75 - 168/74)  BP(mean): --  RR: 18 (22 Nov 2023 04:51) (18 - 19)  SpO2: 97% (22 Nov 2023 08:37) (93% - 97%)    Parameters below as of 22 Nov 2023 08:37  Patient On (Oxygen Delivery Method): nasal cannula, 3L        PHYSICAL EXAM:  GENERAL: NAD  HEENT:  anicteric, moist mucous membranes  CHEST/LUNG:  CTA b/l, no rales, wheezes, or rhonchi  HEART:  RRR, S1, S2  ABDOMEN:  BS+, soft, nontender, nondistended  EXTREMITIES: no edema, cyanosis, or calf tenderness  NERVOUS SYSTEM: answers questions and follows commands appropriately    LABS:                        7.6    10.41 )-----------( 258      ( 22 Nov 2023 09:19 )             26.7     CBC Full  -  ( 22 Nov 2023 09:19 )  WBC Count : 10.41 K/uL  Hemoglobin : 7.6 g/dL  Hematocrit : 26.7 %  Platelet Count - Automated : 258 K/uL  Mean Cell Volume : 76.5 fl  Mean Cell Hemoglobin : 21.8 pg  Mean Cell Hemoglobin Concentration : 28.5 gm/dL  Auto Neutrophil # : 8.86 K/uL  Auto Lymphocyte # : 0.72 K/uL  Auto Monocyte # : 0.50 K/uL  Auto Eosinophil # : 0.14 K/uL  Auto Basophil # : 0.04 K/uL  Auto Neutrophil % : 85.2 %  Auto Lymphocyte % : 6.9 %  Auto Monocyte % : 4.8 %  Auto Eosinophil % : 1.3 %  Auto Basophil % : 0.4 %    22 Nov 2023 09:19    140    |  103    |  16     ----------------------------<  237    4.0     |  29     |  0.99     Ca    8.6        22 Nov 2023 09:19      PT/INR - ( 21 Nov 2023 06:40 )   PT: 13.4 sec;   INR: 1.15 ratio         PTT - ( 21 Nov 2023 06:40 )  PTT:29.3 sec  Urinalysis Basic - ( 22 Nov 2023 09:19 )    Color: x / Appearance: x / SG: x / pH: x  Gluc: 237 mg/dL / Ketone: x  / Bili: x / Urobili: x   Blood: x / Protein: x / Nitrite: x   Leuk Esterase: x / RBC: x / WBC x   Sq Epi: x / Non Sq Epi: x / Bacteria: x      CAPILLARY BLOOD GLUCOSE            Culture - Blood (collected 11-20-23 @ 09:20)  Source: .Blood Blood-Peripheral  Preliminary Report (11-21-23 @ 12:01):    No growth at 24 hours    Culture - Blood (collected 11-20-23 @ 09:15)  Source: .Blood Blood-Peripheral  Preliminary Report (11-21-23 @ 12:01):    No growth at 24 hours        RADIOLOGY & ADDITIONAL TESTS: ____    Personally reviewed.     Consultant(s) Notes Reviewed:  [x] YES  [ ] NO    Date/Time Patient Seen:  		  Referring MD:   Data Reviewed	       Patient is a 68y old  Male who presents with a chief complaint of Pneumonia (21 Nov 2023 13:02)      Subjective/HPI     PAST MEDICAL & SURGICAL HISTORY:  Hypertension    High cholesterol    COPD (chronic obstructive pulmonary disease)    Asthma    GERD (gastroesophageal reflux disease)    Chronic rhinosinusitis    PTSD (post-traumatic stress disorder)  September 11th 2001    Osteoarthrosis, localized    Bakers cyst  Bilateral    Sleep apnea  does not use CPAP machine    Prediabetes    Iron deficiency anemia    S/P knee replacement, right    S/P tonsillectomy and adenoidectomy    S/P wrist surgery  ganglion cyst    S/P arthroscopic surgery of left knee    Bakers cyst  Bilateral removal Bakers Cyst          Medication list         MEDICATIONS  (STANDING):  albuterol/ipratropium for Nebulization 3 milliLiter(s) Nebulizer every 6 hours  amLODIPine   Tablet 10 milliGRAM(s) Oral daily  ARIPiprazole 20 milliGRAM(s) Oral daily  atorvastatin 20 milliGRAM(s) Oral at bedtime  azithromycin   Tablet 500 milliGRAM(s) Oral daily  budesonide 160 MICROgram(s)/formoterol 4.5 MICROgram(s) Inhaler 2 Puff(s) Inhalation two times a day  buPROPion XL (24-Hour) . 300 milliGRAM(s) Oral daily  cefTRIAXone   IVPB 1000 milliGRAM(s) IV Intermittent every 24 hours  famotidine    Tablet 20 milliGRAM(s) Oral two times a day  LORazepam     Tablet 1 milliGRAM(s) Oral <User Schedule>  LORazepam     Tablet 1.5 milliGRAM(s) Oral <User Schedule>  pantoprazole    Tablet 40 milliGRAM(s) Oral every 12 hours  polyethylene glycol 3350 17 Gram(s) Oral two times a day  predniSONE   Tablet 20 milliGRAM(s) Oral daily  sertraline 200 milliGRAM(s) Oral daily  traZODone 150 milliGRAM(s) Oral at bedtime    MEDICATIONS  (PRN):  acetaminophen     Tablet .. 650 milliGRAM(s) Oral every 6 hours PRN Temp greater or equal to 38C (100.4F), Mild Pain (1 - 3)  albuterol    90 MICROgram(s) HFA Inhaler 2 Puff(s) Inhalation every 6 hours PRN for bronchospasm  aluminum hydroxide/magnesium hydroxide/simethicone Suspension 30 milliLiter(s) Oral every 4 hours PRN Dyspepsia  guaiFENesin Oral Liquid (Sugar-Free) 200 milliGRAM(s) Oral every 6 hours PRN Cough  melatonin 3 milliGRAM(s) Oral at bedtime PRN Insomnia  ondansetron Injectable 4 milliGRAM(s) IV Push every 8 hours PRN Nausea and/or Vomiting         Vitals log        ICU Vital Signs Last 24 Hrs  T(C): 36.6 (22 Nov 2023 04:51), Max: 36.6 (21 Nov 2023 20:03)  T(F): 97.8 (22 Nov 2023 04:51), Max: 97.8 (21 Nov 2023 20:03)  HR: 88 (22 Nov 2023 04:51) (80 - 92)  BP: 150/75 (22 Nov 2023 04:51) (150/75 - 168/74)  BP(mean): --  ABP: --  ABP(mean): --  RR: 18 (22 Nov 2023 04:51) (18 - 19)  SpO2: 93% (22 Nov 2023 04:51) (93% - 97%)    O2 Parameters below as of 22 Nov 2023 04:51  Patient On (Oxygen Delivery Method): nasal cannula  O2 Flow (L/min): 3               Input and Output:  I&O's Detail    21 Nov 2023 07:01  -  22 Nov 2023 05:42  --------------------------------------------------------  IN:    Oral Fluid: 580 mL  Total IN: 580 mL    OUT:    Voided (mL): 400 mL  Total OUT: 400 mL    Total NET: 180 mL          Lab Data                        7.1    11.64 )-----------( 238      ( 21 Nov 2023 06:40 )             23.8     11-21    140  |  105  |  20  ----------------------------<  127<H>  4.0   |  33<H>  |  0.76    Ca    8.0<L>      21 Nov 2023 06:40    TPro  6.7  /  Alb  3.1<L>  /  TBili  0.5  /  DBili  0.2  /  AST  51<H>  /  ALT  89<H>  /  AlkPhos  86  11-21    ABG - ( 20 Nov 2023 11:00 )  pH, Arterial: 7.40  pH, Blood: x     /  pCO2: 58    /  pO2: 92    / HCO3: 36    / Base Excess: 11.1  /  SaO2: 99.1                    Review of Systems	      Objective     Physical Examination    heart s1s2  lung dc bS  head nc      Pertinent Lab findings & Imaging      Dino:  NO   Adequate UO     I&O's Detail    21 Nov 2023 07:01  -  22 Nov 2023 05:42  --------------------------------------------------------  IN:    Oral Fluid: 580 mL  Total IN: 580 mL    OUT:    Voided (mL): 400 mL  Total OUT: 400 mL    Total NET: 180 mL               Discussed with:     Cultures:	        Radiology

## 2023-11-23 LAB
ALBUMIN SERPL ELPH-MCNC: 3.2 G/DL — LOW (ref 3.3–5)
ALBUMIN SERPL ELPH-MCNC: 3.2 G/DL — LOW (ref 3.3–5)
ALP SERPL-CCNC: 84 U/L — SIGNIFICANT CHANGE UP (ref 40–120)
ALP SERPL-CCNC: 84 U/L — SIGNIFICANT CHANGE UP (ref 40–120)
ALT FLD-CCNC: 63 U/L — SIGNIFICANT CHANGE UP (ref 12–78)
ALT FLD-CCNC: 63 U/L — SIGNIFICANT CHANGE UP (ref 12–78)
ANION GAP SERPL CALC-SCNC: 7 MMOL/L — SIGNIFICANT CHANGE UP (ref 5–17)
ANION GAP SERPL CALC-SCNC: 7 MMOL/L — SIGNIFICANT CHANGE UP (ref 5–17)
AST SERPL-CCNC: 33 U/L — SIGNIFICANT CHANGE UP (ref 15–37)
AST SERPL-CCNC: 33 U/L — SIGNIFICANT CHANGE UP (ref 15–37)
BASOPHILS # BLD AUTO: 0.07 K/UL — SIGNIFICANT CHANGE UP (ref 0–0.2)
BASOPHILS # BLD AUTO: 0.07 K/UL — SIGNIFICANT CHANGE UP (ref 0–0.2)
BASOPHILS NFR BLD AUTO: 0.7 % — SIGNIFICANT CHANGE UP (ref 0–2)
BASOPHILS NFR BLD AUTO: 0.7 % — SIGNIFICANT CHANGE UP (ref 0–2)
BILIRUB SERPL-MCNC: 0.3 MG/DL — SIGNIFICANT CHANGE UP (ref 0.2–1.2)
BILIRUB SERPL-MCNC: 0.3 MG/DL — SIGNIFICANT CHANGE UP (ref 0.2–1.2)
BUN SERPL-MCNC: 14 MG/DL — SIGNIFICANT CHANGE UP (ref 7–23)
BUN SERPL-MCNC: 14 MG/DL — SIGNIFICANT CHANGE UP (ref 7–23)
CALCIUM SERPL-MCNC: 8.7 MG/DL — SIGNIFICANT CHANGE UP (ref 8.5–10.1)
CALCIUM SERPL-MCNC: 8.7 MG/DL — SIGNIFICANT CHANGE UP (ref 8.5–10.1)
CHLORIDE SERPL-SCNC: 104 MMOL/L — SIGNIFICANT CHANGE UP (ref 96–108)
CHLORIDE SERPL-SCNC: 104 MMOL/L — SIGNIFICANT CHANGE UP (ref 96–108)
CO2 SERPL-SCNC: 32 MMOL/L — HIGH (ref 22–31)
CO2 SERPL-SCNC: 32 MMOL/L — HIGH (ref 22–31)
CREAT SERPL-MCNC: 0.92 MG/DL — SIGNIFICANT CHANGE UP (ref 0.5–1.3)
CREAT SERPL-MCNC: 0.92 MG/DL — SIGNIFICANT CHANGE UP (ref 0.5–1.3)
EGFR: 91 ML/MIN/1.73M2 — SIGNIFICANT CHANGE UP
EGFR: 91 ML/MIN/1.73M2 — SIGNIFICANT CHANGE UP
EOSINOPHIL # BLD AUTO: 0.4 K/UL — SIGNIFICANT CHANGE UP (ref 0–0.5)
EOSINOPHIL # BLD AUTO: 0.4 K/UL — SIGNIFICANT CHANGE UP (ref 0–0.5)
EOSINOPHIL NFR BLD AUTO: 3.9 % — SIGNIFICANT CHANGE UP (ref 0–6)
EOSINOPHIL NFR BLD AUTO: 3.9 % — SIGNIFICANT CHANGE UP (ref 0–6)
GLUCOSE SERPL-MCNC: 113 MG/DL — HIGH (ref 70–99)
GLUCOSE SERPL-MCNC: 113 MG/DL — HIGH (ref 70–99)
HCT VFR BLD CALC: 26.1 % — LOW (ref 39–50)
HCT VFR BLD CALC: 26.1 % — LOW (ref 39–50)
HGB BLD-MCNC: 7.4 G/DL — LOW (ref 13–17)
HGB BLD-MCNC: 7.4 G/DL — LOW (ref 13–17)
IMM GRANULOCYTES NFR BLD AUTO: 0.9 % — SIGNIFICANT CHANGE UP (ref 0–0.9)
IMM GRANULOCYTES NFR BLD AUTO: 0.9 % — SIGNIFICANT CHANGE UP (ref 0–0.9)
LYMPHOCYTES # BLD AUTO: 1.54 K/UL — SIGNIFICANT CHANGE UP (ref 1–3.3)
LYMPHOCYTES # BLD AUTO: 1.54 K/UL — SIGNIFICANT CHANGE UP (ref 1–3.3)
LYMPHOCYTES # BLD AUTO: 15 % — SIGNIFICANT CHANGE UP (ref 13–44)
LYMPHOCYTES # BLD AUTO: 15 % — SIGNIFICANT CHANGE UP (ref 13–44)
MAGNESIUM SERPL-MCNC: 2.1 MG/DL — SIGNIFICANT CHANGE UP (ref 1.6–2.6)
MAGNESIUM SERPL-MCNC: 2.1 MG/DL — SIGNIFICANT CHANGE UP (ref 1.6–2.6)
MCHC RBC-ENTMCNC: 21.9 PG — LOW (ref 27–34)
MCHC RBC-ENTMCNC: 21.9 PG — LOW (ref 27–34)
MCHC RBC-ENTMCNC: 28.4 GM/DL — LOW (ref 32–36)
MCHC RBC-ENTMCNC: 28.4 GM/DL — LOW (ref 32–36)
MCV RBC AUTO: 77.2 FL — LOW (ref 80–100)
MCV RBC AUTO: 77.2 FL — LOW (ref 80–100)
MONOCYTES # BLD AUTO: 0.97 K/UL — HIGH (ref 0–0.9)
MONOCYTES # BLD AUTO: 0.97 K/UL — HIGH (ref 0–0.9)
MONOCYTES NFR BLD AUTO: 9.4 % — SIGNIFICANT CHANGE UP (ref 2–14)
MONOCYTES NFR BLD AUTO: 9.4 % — SIGNIFICANT CHANGE UP (ref 2–14)
NEUTROPHILS # BLD AUTO: 7.21 K/UL — SIGNIFICANT CHANGE UP (ref 1.8–7.4)
NEUTROPHILS # BLD AUTO: 7.21 K/UL — SIGNIFICANT CHANGE UP (ref 1.8–7.4)
NEUTROPHILS NFR BLD AUTO: 70.1 % — SIGNIFICANT CHANGE UP (ref 43–77)
NEUTROPHILS NFR BLD AUTO: 70.1 % — SIGNIFICANT CHANGE UP (ref 43–77)
NRBC # BLD: 0 /100 WBCS — SIGNIFICANT CHANGE UP (ref 0–0)
NRBC # BLD: 0 /100 WBCS — SIGNIFICANT CHANGE UP (ref 0–0)
PHOSPHATE SERPL-MCNC: 3.3 MG/DL — SIGNIFICANT CHANGE UP (ref 2.5–4.5)
PHOSPHATE SERPL-MCNC: 3.3 MG/DL — SIGNIFICANT CHANGE UP (ref 2.5–4.5)
PLATELET # BLD AUTO: 271 K/UL — SIGNIFICANT CHANGE UP (ref 150–400)
PLATELET # BLD AUTO: 271 K/UL — SIGNIFICANT CHANGE UP (ref 150–400)
POTASSIUM SERPL-MCNC: 3.8 MMOL/L — SIGNIFICANT CHANGE UP (ref 3.5–5.3)
POTASSIUM SERPL-MCNC: 3.8 MMOL/L — SIGNIFICANT CHANGE UP (ref 3.5–5.3)
POTASSIUM SERPL-SCNC: 3.8 MMOL/L — SIGNIFICANT CHANGE UP (ref 3.5–5.3)
POTASSIUM SERPL-SCNC: 3.8 MMOL/L — SIGNIFICANT CHANGE UP (ref 3.5–5.3)
PROT SERPL-MCNC: 7.1 G/DL — SIGNIFICANT CHANGE UP (ref 6–8.3)
PROT SERPL-MCNC: 7.1 G/DL — SIGNIFICANT CHANGE UP (ref 6–8.3)
RBC # BLD: 3.38 M/UL — LOW (ref 4.2–5.8)
RBC # BLD: 3.38 M/UL — LOW (ref 4.2–5.8)
RBC # FLD: 16.1 % — HIGH (ref 10.3–14.5)
RBC # FLD: 16.1 % — HIGH (ref 10.3–14.5)
SODIUM SERPL-SCNC: 143 MMOL/L — SIGNIFICANT CHANGE UP (ref 135–145)
SODIUM SERPL-SCNC: 143 MMOL/L — SIGNIFICANT CHANGE UP (ref 135–145)
WBC # BLD: 10.28 K/UL — SIGNIFICANT CHANGE UP (ref 3.8–10.5)
WBC # BLD: 10.28 K/UL — SIGNIFICANT CHANGE UP (ref 3.8–10.5)
WBC # FLD AUTO: 10.28 K/UL — SIGNIFICANT CHANGE UP (ref 3.8–10.5)
WBC # FLD AUTO: 10.28 K/UL — SIGNIFICANT CHANGE UP (ref 3.8–10.5)

## 2023-11-23 PROCEDURE — 99233 SBSQ HOSP IP/OBS HIGH 50: CPT

## 2023-11-23 RX ORDER — CEFUROXIME AXETIL 250 MG
1 TABLET ORAL
Qty: 4 | Refills: 0
Start: 2023-11-23 | End: 2023-11-24

## 2023-11-23 RX ORDER — LOSARTAN POTASSIUM 100 MG/1
25 TABLET, FILM COATED ORAL DAILY
Refills: 0 | Status: DISCONTINUED | OUTPATIENT
Start: 2023-11-23 | End: 2023-11-25

## 2023-11-23 RX ORDER — FERROUS SULFATE 325(65) MG
1 TABLET ORAL
Qty: 30 | Refills: 0
Start: 2023-11-23 | End: 2023-12-22

## 2023-11-23 RX ADMIN — ARIPIPRAZOLE 20 MILLIGRAM(S): 15 TABLET ORAL at 11:42

## 2023-11-23 RX ADMIN — SERTRALINE 200 MILLIGRAM(S): 25 TABLET, FILM COATED ORAL at 11:41

## 2023-11-23 RX ADMIN — Medication 2: at 17:01

## 2023-11-23 RX ADMIN — PANTOPRAZOLE SODIUM 40 MILLIGRAM(S): 20 TABLET, DELAYED RELEASE ORAL at 05:01

## 2023-11-23 RX ADMIN — BUDESONIDE AND FORMOTEROL FUMARATE DIHYDRATE 2 PUFF(S): 160; 4.5 AEROSOL RESPIRATORY (INHALATION) at 08:01

## 2023-11-23 RX ADMIN — Medication 3 MILLILITER(S): at 19:24

## 2023-11-23 RX ADMIN — FAMOTIDINE 20 MILLIGRAM(S): 10 INJECTION INTRAVENOUS at 17:01

## 2023-11-23 RX ADMIN — Medication 20 MILLIGRAM(S): at 05:01

## 2023-11-23 RX ADMIN — BUDESONIDE AND FORMOTEROL FUMARATE DIHYDRATE 2 PUFF(S): 160; 4.5 AEROSOL RESPIRATORY (INHALATION) at 20:24

## 2023-11-23 RX ADMIN — POLYETHYLENE GLYCOL 3350 17 GRAM(S): 17 POWDER, FOR SOLUTION ORAL at 17:02

## 2023-11-23 RX ADMIN — Medication 1.5 MILLIGRAM(S): at 05:00

## 2023-11-23 RX ADMIN — POLYETHYLENE GLYCOL 3350 17 GRAM(S): 17 POWDER, FOR SOLUTION ORAL at 05:01

## 2023-11-23 RX ADMIN — PANTOPRAZOLE SODIUM 40 MILLIGRAM(S): 20 TABLET, DELAYED RELEASE ORAL at 17:00

## 2023-11-23 RX ADMIN — BUPROPION HYDROCHLORIDE 300 MILLIGRAM(S): 150 TABLET, EXTENDED RELEASE ORAL at 11:41

## 2023-11-23 RX ADMIN — Medication 1 MILLIGRAM(S): at 13:50

## 2023-11-23 RX ADMIN — Medication 150 MILLIGRAM(S): at 21:23

## 2023-11-23 RX ADMIN — Medication 3 MILLILITER(S): at 15:10

## 2023-11-23 RX ADMIN — Medication 1.5 MILLIGRAM(S): at 20:24

## 2023-11-23 RX ADMIN — CEFTRIAXONE 100 MILLIGRAM(S): 500 INJECTION, POWDER, FOR SOLUTION INTRAMUSCULAR; INTRAVENOUS at 20:24

## 2023-11-23 RX ADMIN — Medication 3 MILLILITER(S): at 08:06

## 2023-11-23 RX ADMIN — FAMOTIDINE 20 MILLIGRAM(S): 10 INJECTION INTRAVENOUS at 05:00

## 2023-11-23 RX ADMIN — Medication 1: at 12:11

## 2023-11-23 RX ADMIN — Medication 3 MILLILITER(S): at 01:07

## 2023-11-23 RX ADMIN — IRON SUCROSE 100 MILLIGRAM(S): 20 INJECTION, SOLUTION INTRAVENOUS at 11:42

## 2023-11-23 RX ADMIN — ATORVASTATIN CALCIUM 20 MILLIGRAM(S): 80 TABLET, FILM COATED ORAL at 21:23

## 2023-11-23 RX ADMIN — AMLODIPINE BESYLATE 10 MILLIGRAM(S): 2.5 TABLET ORAL at 05:01

## 2023-11-23 NOTE — PROGRESS NOTE ADULT - PROBLEM SELECTOR PROBLEM 10
Airway  Performed by: Noe Lofton  Authorized by: Shameka Ford MD     Final Airway Type:  Supraglottic airway  Final Supraglottic Airway:  Unique  SGA Size*:  4  Attempts*:  1  Ventilation Between Attempts:  None  Number of Other Approaches Attempted:  0   Patient Identified, Procedure confirmed, Emergency equipment available and Safety protocols followed  Location:  OR  Urgency:  Elective  Difficult Airway: No    Indications for Airway Management:  Anesthesia  Spontaneous Ventilation: absent    Sedation Level:  Anesthetized  MILS Maintained Throughout: No    Mask Difficulty Assessment:  0 - not attempted  Performed By:  FIDENCIO  AA:  Noe Lofton   Easy LMA         GERD (gastroesophageal reflux disease)

## 2023-11-23 NOTE — PROGRESS NOTE ADULT - ASSESSMENT
69 yo male w/ pmh COPD, HTN, HLD, Hypertriglyceridemia, BRIAN, Prediabetes, Anxiety/Depression, PTSD, GERD, BRANDON presents with weakness    copd  pna  brandon  HTN  HLD  DM  Anxiety  Depression  GERD  PTSD    check RA sat at rest and on exertion  vs noted  on ABX  bronchodilators and low dose steroids for COPD    low dose steroids and nebs for copd  VBG noted  ct imaging reviewed - eval for PNA  emp ABX  biomarkers - cx -   cough rx regimen  DM care  serial FS  BRANDON - does not use CPAP - sleep hygiene reviewed  PPI for GERD  anxiolysis  emotional support  monitor VS and Sat

## 2023-11-23 NOTE — PROGRESS NOTE ADULT - PROBLEM SELECTOR PLAN 1
-h/o bronchitis 1 month ago treated with two rounds of abx and steroids  -CT Chest: Findings concerning for multifocal pneumonia, most prominent in the right upper lobe. Slightly enlarged mediastinal and right hilar lymph nodes, nonspecific and possibly reactive.  -WBC 14.44, Neutrophils 83.5% on admission -- leukocytosis now resolving  -Continue Rocephin; completed 3-day course of Azithromycin  -taper off NC  -Monitor VS, trend WBC  -pulm, Dr Funes consulted, recs appreciated  -ID, Dr Mart consulted recs appreciated

## 2023-11-23 NOTE — PROGRESS NOTE ADULT - ASSESSMENT
67yo M w/ PMH of COPD (not on O2), HTN, HLD, Hypertriglyceridemia, BRIAN (hx of needing iron infusions), Prediabetes, Anxiety/Depression, PTSD, GERD, BOOKER admitted for multifocal pneumonia and anemia.

## 2023-11-23 NOTE — PROGRESS NOTE ADULT - SUBJECTIVE AND OBJECTIVE BOX
Date/Time Patient Seen:  		  Referring MD:   Data Reviewed	       Patient is a 68y old  Male who presents with a chief complaint of Pneumonia (22 Nov 2023 16:40)      Subjective/HPI     PAST MEDICAL & SURGICAL HISTORY:  Hypertension    High cholesterol    COPD (chronic obstructive pulmonary disease)    Asthma    GERD (gastroesophageal reflux disease)    Chronic rhinosinusitis    PTSD (post-traumatic stress disorder)  September 11th 2001    Osteoarthrosis, localized    Bakers cyst  Bilateral    Sleep apnea  does not use CPAP machine    Prediabetes    Iron deficiency anemia    S/P knee replacement, right    S/P tonsillectomy and adenoidectomy    S/P wrist surgery  ganglion cyst    S/P arthroscopic surgery of left knee    Bakers cyst  Bilateral removal Bakers Cyst          Medication list         MEDICATIONS  (STANDING):  albuterol/ipratropium for Nebulization 3 milliLiter(s) Nebulizer every 6 hours  amLODIPine   Tablet 10 milliGRAM(s) Oral daily  ARIPiprazole 20 milliGRAM(s) Oral daily  atorvastatin 20 milliGRAM(s) Oral at bedtime  budesonide 160 MICROgram(s)/formoterol 4.5 MICROgram(s) Inhaler 2 Puff(s) Inhalation two times a day  buPROPion XL (24-Hour) . 300 milliGRAM(s) Oral daily  cefTRIAXone   IVPB 1000 milliGRAM(s) IV Intermittent every 24 hours  dextrose 5%. 1000 milliLiter(s) (100 mL/Hr) IV Continuous <Continuous>  dextrose 5%. 1000 milliLiter(s) (50 mL/Hr) IV Continuous <Continuous>  dextrose 50% Injectable 25 Gram(s) IV Push once  dextrose 50% Injectable 25 Gram(s) IV Push once  dextrose 50% Injectable 12.5 Gram(s) IV Push once  famotidine    Tablet 20 milliGRAM(s) Oral two times a day  glucagon  Injectable 1 milliGRAM(s) IntraMuscular once  insulin lispro (ADMELOG) corrective regimen sliding scale   SubCutaneous three times a day before meals  insulin lispro (ADMELOG) corrective regimen sliding scale   SubCutaneous at bedtime  iron sucrose Injectable 100 milliGRAM(s) IV Push every 24 hours  LORazepam     Tablet 1 milliGRAM(s) Oral <User Schedule>  LORazepam     Tablet 1.5 milliGRAM(s) Oral <User Schedule>  pantoprazole    Tablet 40 milliGRAM(s) Oral every 12 hours  polyethylene glycol 3350 17 Gram(s) Oral two times a day  predniSONE   Tablet 20 milliGRAM(s) Oral daily  sertraline 200 milliGRAM(s) Oral daily  traZODone 150 milliGRAM(s) Oral at bedtime    MEDICATIONS  (PRN):  acetaminophen     Tablet .. 650 milliGRAM(s) Oral every 6 hours PRN Temp greater or equal to 38C (100.4F), Mild Pain (1 - 3)  albuterol    90 MICROgram(s) HFA Inhaler 2 Puff(s) Inhalation every 6 hours PRN for bronchospasm  aluminum hydroxide/magnesium hydroxide/simethicone Suspension 30 milliLiter(s) Oral every 4 hours PRN Dyspepsia  dextrose Oral Gel 15 Gram(s) Oral once PRN Blood Glucose LESS THAN 70 milliGRAM(s)/deciliter  guaiFENesin Oral Liquid (Sugar-Free) 200 milliGRAM(s) Oral every 6 hours PRN Cough  melatonin 3 milliGRAM(s) Oral at bedtime PRN Insomnia  ondansetron Injectable 4 milliGRAM(s) IV Push every 8 hours PRN Nausea and/or Vomiting         Vitals log        ICU Vital Signs Last 24 Hrs  T(C): 36.8 (23 Nov 2023 04:08), Max: 36.8 (22 Nov 2023 11:44)  T(F): 98.3 (23 Nov 2023 04:08), Max: 98.3 (22 Nov 2023 11:44)  HR: 72 (23 Nov 2023 04:08) (72 - 90)  BP: 146/78 (23 Nov 2023 04:08) (146/78 - 166/75)  BP(mean): --  ABP: --  ABP(mean): --  RR: 19 (23 Nov 2023 04:57) (18 - 19)  SpO2: 97% (23 Nov 2023 04:57) (90% - 98%)    O2 Parameters below as of 23 Nov 2023 04:57  Patient On (Oxygen Delivery Method): nasal cannula  O2 Flow (L/min): 3               Input and Output:  I&O's Detail    21 Nov 2023 07:01  -  22 Nov 2023 07:00  --------------------------------------------------------  IN:    IV PiggyBack: 50 mL    Oral Fluid: 880 mL  Total IN: 930 mL    OUT:    Voided (mL): 400 mL  Total OUT: 400 mL    Total NET: 530 mL      22 Nov 2023 07:01  -  23 Nov 2023 05:41  --------------------------------------------------------  IN:    Oral Fluid: 240 mL  Total IN: 240 mL    OUT:  Total OUT: 0 mL    Total NET: 240 mL          Lab Data                        7.6    10.41 )-----------( 258      ( 22 Nov 2023 09:19 )             26.7     11-22    140  |  103  |  16  ----------------------------<  237<H>  4.0   |  29  |  0.99    Ca    8.6      22 Nov 2023 09:19    TPro  6.7  /  Alb  3.1<L>  /  TBili  0.5  /  DBili  0.2  /  AST  51<H>  /  ALT  89<H>  /  AlkPhos  86  11-21            Review of Systems	      Objective     Physical Examination  heart s1s2  lung dc BS  head nc        Pertinent Lab findings & Imaging      Dino:  NO   Adequate UO     I&O's Detail    21 Nov 2023 07:01  -  22 Nov 2023 07:00  --------------------------------------------------------  IN:    IV PiggyBack: 50 mL    Oral Fluid: 880 mL  Total IN: 930 mL    OUT:    Voided (mL): 400 mL  Total OUT: 400 mL    Total NET: 530 mL      22 Nov 2023 07:01  -  23 Nov 2023 05:41  --------------------------------------------------------  IN:    Oral Fluid: 240 mL  Total IN: 240 mL    OUT:  Total OUT: 0 mL    Total NET: 240 mL               Discussed with:     Cultures:	        Radiology

## 2023-11-23 NOTE — PROGRESS NOTE ADULT - PROBLEM SELECTOR PLAN 4
h/o mild COPD not on home O2  -takes Symbicort inhaler daily, increased use of his albuterol inhaler over the past few days  -ABG indicative of compensated chronic hypercarbic respiratory failure  -pulm, Dr Funes consulted, recs appreciated   -Amina q6h

## 2023-11-23 NOTE — CHART NOTE - NSCHARTNOTEFT_GEN_A_CORE
Patient requesting to leave the hospital. We explained to the patient why he is currently hospitalized and the risks of leaving the hospital before treatment is complete; including risk of shortness of breath, fatigue, syncope, and death. Patient still is adamant about wanting to leave the hospital. Patient left against our medical advice. PO antibx sent to patients pharmacy. Patient instructed to pick them up as soon as possible. Patient requesting to leave the hospital. We explained to the patient why he is currently hospitalized and the risks of leaving the hospital before treatment is complete; including risk of shortness of breath, fatigue, syncope, and death. Patient still is adamant about wanting to leave the hospital. Patient left against our medical advice. PO antibx sent to patients pharmacy. Patient instructed to pick them up as soon as possible.    addendum  - patient now agreeable to staying   - will not leave AMA   - primary team to f/u Patient requesting to leave the hospital. We explained to the patient why he is currently hospitalized and the risks of leaving the hospital before treatment is complete; including risk of shortness of breath, fatigue, syncope, and death.   Patient still is adamant about wanting to leave the hospital. Patient left against our medical advice. PO antibx sent to patients pharmacy. Patient instructed to pick them up as soon as possible.    addendum  - patient now agreeable to staying   - will not leave AMA   - primary team to f/u

## 2023-11-23 NOTE — PROGRESS NOTE ADULT - PROBLEM SELECTOR PLAN 7
increased amlodipine to 10mg daily  will add losartan 25mg po daily especially given new T2DM diagnosis

## 2023-11-23 NOTE — PROGRESS NOTE ADULT - SUBJECTIVE AND OBJECTIVE BOX
Patient is a 68y old  Male who presents with a chief complaint of SOB, cough.      INTERVAL HPI/OVERNIGHT EVENTS: Pt was seen and examined at bedside. Overnight, pt was considering leaving AMA as he felt frustrated that nursing were upset with him walking without assistance. Pt still has CARTER. Pt reports cough is improving. SOB much improved. Pt denies headache, dizziness, lightheadedness, fever, chills, CP, palpitations, abdominal pain, n/v, numbness/tingling.     MEDICATIONS  (STANDING):  albuterol/ipratropium for Nebulization 3 milliLiter(s) Nebulizer every 6 hours  amLODIPine   Tablet 10 milliGRAM(s) Oral daily  ARIPiprazole 20 milliGRAM(s) Oral daily  atorvastatin 20 milliGRAM(s) Oral at bedtime  budesonide 160 MICROgram(s)/formoterol 4.5 MICROgram(s) Inhaler 2 Puff(s) Inhalation two times a day  buPROPion XL (24-Hour) . 300 milliGRAM(s) Oral daily  cefTRIAXone   IVPB 1000 milliGRAM(s) IV Intermittent every 24 hours  dextrose 5%. 1000 milliLiter(s) (100 mL/Hr) IV Continuous <Continuous>  dextrose 5%. 1000 milliLiter(s) (50 mL/Hr) IV Continuous <Continuous>  dextrose 50% Injectable 25 Gram(s) IV Push once  dextrose 50% Injectable 25 Gram(s) IV Push once  dextrose 50% Injectable 12.5 Gram(s) IV Push once  famotidine    Tablet 20 milliGRAM(s) Oral two times a day  glucagon  Injectable 1 milliGRAM(s) IntraMuscular once  insulin lispro (ADMELOG) corrective regimen sliding scale   SubCutaneous three times a day before meals  insulin lispro (ADMELOG) corrective regimen sliding scale   SubCutaneous at bedtime  iron sucrose Injectable 100 milliGRAM(s) IV Push every 24 hours  LORazepam     Tablet 1 milliGRAM(s) Oral <User Schedule>  LORazepam     Tablet 1.5 milliGRAM(s) Oral <User Schedule>  pantoprazole    Tablet 40 milliGRAM(s) Oral every 12 hours  polyethylene glycol 3350 17 Gram(s) Oral two times a day  predniSONE   Tablet 20 milliGRAM(s) Oral daily  sertraline 200 milliGRAM(s) Oral daily  traZODone 150 milliGRAM(s) Oral at bedtime    MEDICATIONS  (PRN):  acetaminophen     Tablet .. 650 milliGRAM(s) Oral every 6 hours PRN Temp greater or equal to 38C (100.4F), Mild Pain (1 - 3)  albuterol    90 MICROgram(s) HFA Inhaler 2 Puff(s) Inhalation every 6 hours PRN for bronchospasm  aluminum hydroxide/magnesium hydroxide/simethicone Suspension 30 milliLiter(s) Oral every 4 hours PRN Dyspepsia  dextrose Oral Gel 15 Gram(s) Oral once PRN Blood Glucose LESS THAN 70 milliGRAM(s)/deciliter  guaiFENesin Oral Liquid (Sugar-Free) 200 milliGRAM(s) Oral every 6 hours PRN Cough  melatonin 3 milliGRAM(s) Oral at bedtime PRN Insomnia  ondansetron Injectable 4 milliGRAM(s) IV Push every 8 hours PRN Nausea and/or Vomiting      Allergies    No Known Drug Allergies  latex (Rash)    Intolerances        REVIEW OF SYSTEMS:  CONSTITUTIONAL: No fever or chills  HEENT:  No headache, no sore throat  RESPIRATORY: Admits to cough. +CARTER; Denies wheezing, or shortness of breath at rest  CARDIOVASCULAR: No chest pain, palpitations  GASTROINTESTINAL: No abd pain, nausea, vomiting, or diarrhea  GENITOURINARY: No dysuria, frequency, or hematuria  NEUROLOGICAL: no focal weakness or dizziness  MUSCULOSKELETAL: no myalgias     Vital Signs Last 24 Hrs  T(C): 36.8 (23 Nov 2023 04:08), Max: 36.8 (22 Nov 2023 11:44)  T(F): 98.3 (23 Nov 2023 04:08), Max: 98.3 (22 Nov 2023 11:44)  HR: 72 (23 Nov 2023 04:08) (72 - 90)  BP: 146/78 (23 Nov 2023 04:08) (146/78 - 166/75)  BP(mean): --  RR: 19 (23 Nov 2023 04:57) (18 - 19)  SpO2: 97% (23 Nov 2023 04:57) (90% - 98%)    O2 Parameters below as of 23 Nov 2023 04:57  Patient On (Oxygen Delivery Method): nasal cannula  O2 Flow (L/min): 3      PHYSICAL EXAM:  GENERAL: NAD at rest on 2L NC  HEENT:  anicteric, moist mucous membranes  CHEST/LUNG:  decreased breath sounds, grossly CTA b/l; no rales, wheezes, or rhonchi appreciated  HEART:  RRR, S1, S2  ABDOMEN:  BS+, soft, nontender, nondistended  EXTREMITIES: no edema, cyanosis, or calf tenderness  NERVOUS SYSTEM: answers questions and follows commands appropriately    LABS:                                   7.4    10.28 )-----------( 271      ( 23 Nov 2023 06:43 )             26.1     CBC Full  -  ( 23 Nov 2023 06:43 )  WBC Count : 10.28 K/uL  Hemoglobin : 7.4 g/dL  Hematocrit : 26.1 %  Platelet Count - Automated : 271 K/uL  Mean Cell Volume : 77.2 fl  Mean Cell Hemoglobin : 21.9 pg  Mean Cell Hemoglobin Concentration : 28.4 gm/dL  Auto Neutrophil # : 7.21 K/uL  Auto Lymphocyte # : 1.54 K/uL  Auto Monocyte # : 0.97 K/uL  Auto Eosinophil # : 0.40 K/uL  Auto Basophil # : 0.07 K/uL  Auto Neutrophil % : 70.1 %  Auto Lymphocyte % : 15.0 %  Auto Monocyte % : 9.4 %  Auto Eosinophil % : 3.9 %  Auto Basophil % : 0.7 %    11-23    143  |  104  |  14  ----------------------------<  113<H>  3.8   |  32<H>  |  0.92    Ca    8.7      23 Nov 2023 06:43  Phos  3.3     11-23  Mg     2.1     11-23    TPro  7.1  /  Alb  3.2<L>  /  TBili  0.3  /  DBili  x   /  AST  33  /  ALT  63  /  AlkPhos  84  11-23      Urinalysis Basic - ( 22 Nov 2023 09:19 )    Color: x / Appearance: x / SG: x / pH: x  Gluc: 237 mg/dL / Ketone: x  / Bili: x / Urobili: x   Blood: x / Protein: x / Nitrite: x   Leuk Esterase: x / RBC: x / WBC x   Sq Epi: x / Non Sq Epi: x / Bacteria: x      CAPILLARY BLOOD GLUCOSE        Culture - Blood (collected 20 Nov 2023 09:20)  Source: .Blood Blood-Peripheral  Preliminary Report (23 Nov 2023 12:01):    No growth at 72 Hours    Culture - Blood (collected 20 Nov 2023 09:15)  Source: .Blood Blood-Peripheral  Preliminary Report (23 Nov 2023 12:01):    No growth at 72 Hours        RADIOLOGY & ADDITIONAL TESTS:     Personally reviewed.     Consultant(s) Notes Reviewed:  [x] YES  [ ] NO

## 2023-11-23 NOTE — PROGRESS NOTE ADULT - PROBLEM SELECTOR PLAN 6
-Hgb found to be 6.5%, newly diagnosed T2DM  -continue with low dose ISS  -diabetes NP consulted -Hgb found to be 6.5%, newly diagnosed T2DM  -continue with low dose ISS   -diabetes NP consulted

## 2023-11-23 NOTE — PROGRESS NOTE ADULT - PROBLEM SELECTOR PLAN 2
-h/o iron deficiency anemia w/ multiple iron transfusions with hematology in the past but has not seen for >1 year  -EGD/colonoscopy 2 years ago w/ gastritis, nonmalignant polyps and diverticula per pt; father history colon cancer  -Hgb stable in low 7s currently, but having signs of symptomatic anemia and wishes to have 1un PRBC -- will transfuse 1un PRBC and monitor  -severe iron deficiency; discussed risks/benefits of venofer and pt agreed to venofer -- will complete 3-day course of venofer 100mg IV q24h tomorrow  -transfuse for HgB <7 or if patient symptomatic  -on [facility] in Jan 2022 patient had a Hgb of 13.1 and now it is in mid-7s  -f/u FOBT- no active bleed noticed  -type and screen

## 2023-11-24 DIAGNOSIS — I48.91 UNSPECIFIED ATRIAL FIBRILLATION: ICD-10-CM

## 2023-11-24 LAB
ANION GAP SERPL CALC-SCNC: 4 MMOL/L — LOW (ref 5–17)
ANION GAP SERPL CALC-SCNC: 4 MMOL/L — LOW (ref 5–17)
BUN SERPL-MCNC: 12 MG/DL — SIGNIFICANT CHANGE UP (ref 7–23)
BUN SERPL-MCNC: 12 MG/DL — SIGNIFICANT CHANGE UP (ref 7–23)
CALCIUM SERPL-MCNC: 8.4 MG/DL — LOW (ref 8.5–10.1)
CALCIUM SERPL-MCNC: 8.4 MG/DL — LOW (ref 8.5–10.1)
CHLORIDE SERPL-SCNC: 102 MMOL/L — SIGNIFICANT CHANGE UP (ref 96–108)
CHLORIDE SERPL-SCNC: 102 MMOL/L — SIGNIFICANT CHANGE UP (ref 96–108)
CO2 SERPL-SCNC: 34 MMOL/L — HIGH (ref 22–31)
CO2 SERPL-SCNC: 34 MMOL/L — HIGH (ref 22–31)
CREAT SERPL-MCNC: 0.83 MG/DL — SIGNIFICANT CHANGE UP (ref 0.5–1.3)
CREAT SERPL-MCNC: 0.83 MG/DL — SIGNIFICANT CHANGE UP (ref 0.5–1.3)
EGFR: 96 ML/MIN/1.73M2 — SIGNIFICANT CHANGE UP
EGFR: 96 ML/MIN/1.73M2 — SIGNIFICANT CHANGE UP
GLUCOSE SERPL-MCNC: 135 MG/DL — HIGH (ref 70–99)
GLUCOSE SERPL-MCNC: 135 MG/DL — HIGH (ref 70–99)
HCT VFR BLD CALC: 29.9 % — LOW (ref 39–50)
HCT VFR BLD CALC: 29.9 % — LOW (ref 39–50)
HGB BLD-MCNC: 8.5 G/DL — LOW (ref 13–17)
HGB BLD-MCNC: 8.5 G/DL — LOW (ref 13–17)
MCHC RBC-ENTMCNC: 22.4 PG — LOW (ref 27–34)
MCHC RBC-ENTMCNC: 22.4 PG — LOW (ref 27–34)
MCHC RBC-ENTMCNC: 28.4 GM/DL — LOW (ref 32–36)
MCHC RBC-ENTMCNC: 28.4 GM/DL — LOW (ref 32–36)
MCV RBC AUTO: 78.9 FL — LOW (ref 80–100)
MCV RBC AUTO: 78.9 FL — LOW (ref 80–100)
MRSA PCR RESULT.: SIGNIFICANT CHANGE UP
MRSA PCR RESULT.: SIGNIFICANT CHANGE UP
NRBC # BLD: 0 /100 WBCS — SIGNIFICANT CHANGE UP (ref 0–0)
NRBC # BLD: 0 /100 WBCS — SIGNIFICANT CHANGE UP (ref 0–0)
PLATELET # BLD AUTO: 259 K/UL — SIGNIFICANT CHANGE UP (ref 150–400)
PLATELET # BLD AUTO: 259 K/UL — SIGNIFICANT CHANGE UP (ref 150–400)
POTASSIUM SERPL-MCNC: 3.9 MMOL/L — SIGNIFICANT CHANGE UP (ref 3.5–5.3)
POTASSIUM SERPL-MCNC: 3.9 MMOL/L — SIGNIFICANT CHANGE UP (ref 3.5–5.3)
POTASSIUM SERPL-SCNC: 3.9 MMOL/L — SIGNIFICANT CHANGE UP (ref 3.5–5.3)
POTASSIUM SERPL-SCNC: 3.9 MMOL/L — SIGNIFICANT CHANGE UP (ref 3.5–5.3)
RBC # BLD: 3.79 M/UL — LOW (ref 4.2–5.8)
RBC # BLD: 3.79 M/UL — LOW (ref 4.2–5.8)
RBC # FLD: 16.2 % — HIGH (ref 10.3–14.5)
RBC # FLD: 16.2 % — HIGH (ref 10.3–14.5)
S AUREUS DNA NOSE QL NAA+PROBE: DETECTED
S AUREUS DNA NOSE QL NAA+PROBE: DETECTED
SODIUM SERPL-SCNC: 140 MMOL/L — SIGNIFICANT CHANGE UP (ref 135–145)
SODIUM SERPL-SCNC: 140 MMOL/L — SIGNIFICANT CHANGE UP (ref 135–145)
WBC # BLD: 12.32 K/UL — HIGH (ref 3.8–10.5)
WBC # BLD: 12.32 K/UL — HIGH (ref 3.8–10.5)
WBC # FLD AUTO: 12.32 K/UL — HIGH (ref 3.8–10.5)
WBC # FLD AUTO: 12.32 K/UL — HIGH (ref 3.8–10.5)

## 2023-11-24 PROCEDURE — 99223 1ST HOSP IP/OBS HIGH 75: CPT

## 2023-11-24 PROCEDURE — 99233 SBSQ HOSP IP/OBS HIGH 50: CPT | Mod: GC

## 2023-11-24 PROCEDURE — 93010 ELECTROCARDIOGRAM REPORT: CPT

## 2023-11-24 RX ORDER — HEPARIN SODIUM 5000 [USP'U]/ML
9500 INJECTION INTRAVENOUS; SUBCUTANEOUS ONCE
Refills: 0 | Status: COMPLETED | OUTPATIENT
Start: 2023-11-24 | End: 2023-11-24

## 2023-11-24 RX ORDER — METOPROLOL TARTRATE 50 MG
5 TABLET ORAL ONCE
Refills: 0 | Status: COMPLETED | OUTPATIENT
Start: 2023-11-24 | End: 2023-11-24

## 2023-11-24 RX ORDER — HEPARIN SODIUM 5000 [USP'U]/ML
4500 INJECTION INTRAVENOUS; SUBCUTANEOUS EVERY 6 HOURS
Refills: 0 | Status: DISCONTINUED | OUTPATIENT
Start: 2023-11-24 | End: 2023-11-25

## 2023-11-24 RX ORDER — HEPARIN SODIUM 5000 [USP'U]/ML
9500 INJECTION INTRAVENOUS; SUBCUTANEOUS EVERY 6 HOURS
Refills: 0 | Status: DISCONTINUED | OUTPATIENT
Start: 2023-11-24 | End: 2023-11-25

## 2023-11-24 RX ORDER — HEPARIN SODIUM 5000 [USP'U]/ML
INJECTION INTRAVENOUS; SUBCUTANEOUS
Qty: 25000 | Refills: 0 | Status: DISCONTINUED | OUTPATIENT
Start: 2023-11-24 | End: 2023-11-25

## 2023-11-24 RX ORDER — METOPROLOL TARTRATE 50 MG
5 TABLET ORAL ONCE
Refills: 0 | Status: DISCONTINUED | OUTPATIENT
Start: 2023-11-24 | End: 2023-11-24

## 2023-11-24 RX ORDER — METOPROLOL TARTRATE 50 MG
25 TABLET ORAL EVERY 6 HOURS
Refills: 0 | Status: DISCONTINUED | OUTPATIENT
Start: 2023-11-24 | End: 2023-11-25

## 2023-11-24 RX ADMIN — Medication 20 MILLIGRAM(S): at 05:49

## 2023-11-24 RX ADMIN — BUPROPION HYDROCHLORIDE 300 MILLIGRAM(S): 150 TABLET, EXTENDED RELEASE ORAL at 12:43

## 2023-11-24 RX ADMIN — FAMOTIDINE 20 MILLIGRAM(S): 10 INJECTION INTRAVENOUS at 18:56

## 2023-11-24 RX ADMIN — Medication 1 MILLIGRAM(S): at 12:43

## 2023-11-24 RX ADMIN — LOSARTAN POTASSIUM 25 MILLIGRAM(S): 100 TABLET, FILM COATED ORAL at 05:49

## 2023-11-24 RX ADMIN — HEPARIN SODIUM 2100 UNIT(S)/HR: 5000 INJECTION INTRAVENOUS; SUBCUTANEOUS at 19:37

## 2023-11-24 RX ADMIN — HEPARIN SODIUM 9500 UNIT(S): 5000 INJECTION INTRAVENOUS; SUBCUTANEOUS at 18:35

## 2023-11-24 RX ADMIN — AMLODIPINE BESYLATE 10 MILLIGRAM(S): 2.5 TABLET ORAL at 05:49

## 2023-11-24 RX ADMIN — POLYETHYLENE GLYCOL 3350 17 GRAM(S): 17 POWDER, FOR SOLUTION ORAL at 05:57

## 2023-11-24 RX ADMIN — FAMOTIDINE 20 MILLIGRAM(S): 10 INJECTION INTRAVENOUS at 05:49

## 2023-11-24 RX ADMIN — ARIPIPRAZOLE 20 MILLIGRAM(S): 15 TABLET ORAL at 12:42

## 2023-11-24 RX ADMIN — IRON SUCROSE 100 MILLIGRAM(S): 20 INJECTION, SOLUTION INTRAVENOUS at 12:41

## 2023-11-24 RX ADMIN — SERTRALINE 200 MILLIGRAM(S): 25 TABLET, FILM COATED ORAL at 12:43

## 2023-11-24 RX ADMIN — Medication 3 MILLILITER(S): at 00:47

## 2023-11-24 RX ADMIN — Medication 1.5 MILLIGRAM(S): at 05:49

## 2023-11-24 RX ADMIN — PANTOPRAZOLE SODIUM 40 MILLIGRAM(S): 20 TABLET, DELAYED RELEASE ORAL at 05:49

## 2023-11-24 RX ADMIN — Medication 25 MILLIGRAM(S): at 19:05

## 2023-11-24 RX ADMIN — ATORVASTATIN CALCIUM 20 MILLIGRAM(S): 80 TABLET, FILM COATED ORAL at 20:46

## 2023-11-24 RX ADMIN — CEFTRIAXONE 100 MILLIGRAM(S): 500 INJECTION, POWDER, FOR SOLUTION INTRAMUSCULAR; INTRAVENOUS at 20:40

## 2023-11-24 RX ADMIN — Medication 1.5 MILLIGRAM(S): at 20:40

## 2023-11-24 RX ADMIN — Medication 3 MILLILITER(S): at 13:32

## 2023-11-24 RX ADMIN — Medication 2: at 12:44

## 2023-11-24 RX ADMIN — Medication 3 MILLILITER(S): at 19:08

## 2023-11-24 RX ADMIN — BUDESONIDE AND FORMOTEROL FUMARATE DIHYDRATE 2 PUFF(S): 160; 4.5 AEROSOL RESPIRATORY (INHALATION) at 22:00

## 2023-11-24 RX ADMIN — PANTOPRAZOLE SODIUM 40 MILLIGRAM(S): 20 TABLET, DELAYED RELEASE ORAL at 18:51

## 2023-11-24 RX ADMIN — Medication 150 MILLIGRAM(S): at 20:46

## 2023-11-24 RX ADMIN — HEPARIN SODIUM 2100 UNIT(S)/HR: 5000 INJECTION INTRAVENOUS; SUBCUTANEOUS at 18:52

## 2023-11-24 RX ADMIN — Medication 5 MILLIGRAM(S): at 09:31

## 2023-11-24 NOTE — PROGRESS NOTE ADULT - TIME BILLING
Pt seen + examined on 11/23. Note written by attending, see above.  Time spent: 50min. Time spent discussing/coordinating care with consultants, CM, and counseling the patient on medical condition -- multifocal pneumonia and iron deficiency anemia, possible etiology of BRIAN, including chronic microscopic GI losses, poor oral iron absorption, venofer, risks of venofer, symptomatic anemia, PRBC, risks/benefits of blood transfusion.
Pt seen + examined on 11/22. Case discussed with resident. Agree with assessment and plan above (edited by me in detail):  Time spent: 50min. Time spent discussing/coordinating care with consultants, CM, and counseling the patient on medical condition -- multifocal pneumonia and iron deficiency anemia, possible etiology of BRIAN, including chronic microscopic GI losses, poor oral iron absorption, venofer, risks of venofer.     69yo M w/ PMH of COPD (not on O2), HTN, HLD, Hypertriglyceridemia, BRIAN (hx of needing iron infusions), Prediabetes, Anxiety/Depression, PTSD, GERD, BOOKER admitted for multifocal pneumonia and anemia.    -h/o bronchitis 1 month ago treated with two rounds of abx and steroids  -CT Chest: Findings concerning for multifocal pneumonia, most prominent in the right upper lobe. Slightly enlarged mediastinal and right hilar lymph nodes, nonspecific and possibly reactive.  -WBC 14.44, Neutrophils 83.5% on admission -- leukocytosis now resolving  -Continue Rocephin; completed 3-day course of Azithromycin  -taper off NC  -Monitor for fever, trend WBC  -pulm, Dr Funes consulted, recs appreciated  -ID, Dr Mart consulted recs appreciated    -h/o iron deficiency anemia w/ multiple iron transfusions with hematology in the past but has not seen for >1 year  -EGD/colonoscopy 2 years ago w/ gastritis, nonmalignant polyps and diverticula per pt; father history colon cancer  -Hgb stable in mid 7s currently  -severe iron deficiency; discussed risks/benefits of venofer and pt agreed to venofer -- started today, will give for 2-3 days depending on how long pt is hospitalized  -transfuse for HgB <7 or if patient symptomatic  -on [facility] in Jan 2022 patient had a Hgb of 13.1 and now it is in mid-7s  -f/u FOBT- no active bleed noticed  -type and screen
Pt seen + examined on 11/24. Case discussed with resident. Agree with assessment and plan above (edited by me in detail):  Time spent: 50min. Time spent discussing/coordinating care with consultants, CM, and counseling the patient and subsequently pt's daughter over the phone on medical condition -- multifocal pneumonia and iron deficiency anemia, possible etiology of BRIAN, including chronic microscopic GI losses, poor oral iron absorption, venofer, PRBC transfusion, new onset afib w/ RVR, metoprolol, A/C, heparin drip, FOBT.     67yo M w/ PMH of COPD (not on O2), HTN, HLD, Hypertriglyceridemia, BRIAN (hx of needing iron infusions), Prediabetes, Anxiety/Depression, PTSD, GERD, BOOKER admitted for multifocal pneumonia and anemia, course c/b afib w/ RVR.    -h/o bronchitis 1 month ago treated with two rounds of abx and steroids  -CT Chest: Findings concerning for multifocal pneumonia, most prominent in the right upper lobe. Slightly enlarged mediastinal and right hilar lymph nodes, nonspecific and possibly reactive.  -WBC 14.44, Neutrophils 83.5% on admission -- leukocytosis now 12.32, though pt has been on prednisone this admission, making it difficult to gauge the significance of the WBC elevation  -Continue Rocephin; completed 3-day course of Azithromycin  -taper off NC  -Monitor VS, trend WBC  -pulm, Dr Funes consulted, recs appreciated  -ID, Dr Mart consulted recs appreciated    - New onset afib w/ RVR on 11/24 morning  - s/p 1x lopressor 5mg IV  - start metoprolol tartrate 25mg po q6hrs for rate control   - [id number] score is 3  - discussed risks/benefits of A/C  - will start heparin drip first and monitor for signs of bleeding prior to transitioning to DOAC  - possibly secondary to acute illness   - Cardiology (Boom) consulted, recs appreciated    -h/o iron deficiency anemia w/ multiple iron transfusions with hematology in the past but has not seen for >1 year  -EGD/colonoscopy 2 years ago w/ gastritis, nonmalignant polyps and diverticula per pt; father history colon cancer  -Hgb stable in low 7s currently, but having signs of symptomatic anemia and wishes to have 1un PRBC -- s/p 1unit pRBC transfusion now 8.5  -severe iron deficiency; discussed risks/benefits of venofer and pt agreed to venofer -- will complete 3-day course of venofer 100mg IV q24h today  -transfuse for HgB <7 or if patient symptomatic  -on [facility] in Jan 2022 patient had a Hgb of 13.1 and now it is in mid-7s  -f/u FOBT- no active bleed noticed  -type and screen
pt seen and examine today see above plan - admitted with multifocal pna / CAP  - -CT Chest: Findings concerning for multifocal pneumonia, most prominent in the right  upper lobe. Slightly enlarged mediastinal and right hilar lymph nodes, nonspecific and possibly reactive- leucocytosis  Neutrophils 83.5%  - started on  Rocephin, Azithromycin day 1  , blood cult neg   urine legionella and strep pneumo   pending . daughter charles update given.

## 2023-11-24 NOTE — CONSULT NOTE ADULT - ASSESSMENT
67yo M w/ PMH of COPD (not on O2), HTN, HLD, Hypertriglyceridemia, BRIAN (hx of needing iron infusions), dabetes, Anxiety/Depression, PTSD, GERD, BOOKER admitted for multifocal pneumonia and anemia c/b Afib with RVR.    Afib with RVR, HTN, HLD  - New onset, up to 140 bpm, reported CARTER, likely in the setting of PNA  - EKG Atrial fibrillation with RVR, 129 bpm  - s/p metoprolol 5mg IVP, rates improved to  bpm  - start on metoprolol 25mg q6hrs for rate control   - CHADSVASC score 3     - Euvolemic on exam  - Check TTE     - BP controlled  - on Amlodipine and Losartan   67yo M w/ PMH of COPD (not on O2), HTN, HLD, Hypertriglyceridemia, BRIAN (hx of needing iron infusions), dabetes, Anxiety/Depression, PTSD, GERD, BOOKER admitted for multifocal pneumonia and anemia c/b Afib with RVR.    Afib with RVR, HTN, HLD  - New onset, up to 140 bpm, reported CARTER, likely in the setting of PNA  - EKG Atrial fibrillation with RVR, 129 bpm  - s/p metoprolol 5mg IVP, rates improved to  bpm  - start on metoprolol 25mg q6hrs for rate control   - CHADSVASC score 3, would start on full AC    - Euvolemic on exam  - Check TTE     - BP controlled  - on Amlodipine and Losartan   67yo M w/ PMH of COPD (not on O2), HTN, HLD, Hypertriglyceridemia, BRIAN (hx of needing iron infusions), dabetes, Anxiety/Depression, PTSD, GERD, BOOKER admitted for multifocal pneumonia and anemia c/b Afib with RVR.    Afib with RVR, HTN, HLD  - New onset, up to 140 bpm, reported CARTER, likely in the setting of PNA  - EKG Atrial fibrillation with RVR, 129 bpm  - s/p metoprolol 5mg IVP, rates improved to  bpm  - start on metoprolol 25mg q6hrs for rate control   - CHADSVASC score 3, would start on full AC    - Euvolemic on exam  - Check TTE     - BP controlled  - on Amlodipine and Losartan    - Monitor and replete lytes, keep K>4, Mg>2.  - Will continue to follow.    Daniel Avina NP  Nurse Practitioner- Cardiology   Call TEAMS   69yo M w/ PMH of COPD (not on O2), HTN, HLD, Hypertriglyceridemia, BRAIN (hx of needing iron infusions), dabetes, Anxiety/Depression, PTSD, GERD, BOOKER admitted for multifocal pneumonia and anemia c/b Afib with RVR.    Afib with RVR, HTN, HLD  - New onset, up to 140 bpm, reported CARTER, likely in the setting of PNA  - EKG Atrial fibrillation with RVR, 129 bpm  - s/p metoprolol 5mg IVP, rates improved to  bpm  - start on metoprolol 25mg q6hrs for rate control   - CHADSVASC score 3    - Euvolemic on exam  - Check TTE     - BP controlled  - on Amlodipine and Losartan    - Monitor and replete lytes, keep K>4, Mg>2.  - Will continue to follow.    Daniel Avina NP  Nurse Practitioner- Cardiology   Call TEAMS   69yo M w/ PMH of COPD (not on O2), HTN, HLD, Hypertriglyceridemia, BRIAN (hx of needing iron infusions), dabetes, Anxiety/Depression, PTSD, GERD, BOOKER admitted for multifocal pneumonia and anemia c/b Afib with RVR.    Afib with RVR, HTN, HLD  - New onset, up to 140 bpm, reported CARTER, likely in the setting of PNA  - EKG Atrial fibrillation with RVR, 129 bpm  - s/p metoprolol 5mg IVP, rates improved to  bpm  - start on metoprolol 25mg q6hrs for rate control   - CHADSVASC score 3    - Euvolemic on exam  - Check TTE     - BP controlled  - start BB  - continue Losartan, dc amlodipine     - Monitor and replete lytes, keep K>4, Mg>2.  - Will continue to follow.    Daniel Avina NP  Nurse Practitioner- Cardiology   Call TEAMS   67yo M w/ PMH of COPD (not on O2), HTN, HLD, Hypertriglyceridemia, BRIAN (hx of needing iron infusions), dabetes, Anxiety/Depression, PTSD, GERD, BOOKER admitted for multifocal pneumonia and anemia c/b Afib with RVR.    Afib with RVR, HTN, HLD  - New onset, up to 140 bpm, reported CARTER, likely in the setting of PNA  - EKG Atrial fibrillation with RVR, 129 bpm  - s/p metoprolol 5mg IVP, rates improved to  bpm  - start on metoprolol 25mg q6hrs for rate control   - CHADSVASC score 3, start on heparin gtt for full AC. if tolerating without severe drop in hgb, switch to DOAC    - Euvolemic on exam  - Check TTE     - BP controlled  - start BB  - continue Losartan, dc amlodipine     - Monitor and replete lytes, keep K>4, Mg>2.  - Will continue to follow.    Daniel Avina NP  Nurse Practitioner- Cardiology   Call TEAMS   69yo M w/ PMH of COPD (not on O2), HTN, HLD, Hypertriglyceridemia, BRIAN (hx of needing iron infusions), dabetes, Anxiety/Depression, PTSD, GERD, BOOKER admitted for multifocal pneumonia and anemia c/b Afib with RVR.    Afib with RVR, HTN, HLD  - New onset, up to 140 bpm, reported CARTER, likely in the setting of PNA  - EKG Atrial fibrillation with RVR, 129 bpm  - s/p metoprolol 5mg IVP, rates improved to  bpm  - start on metoprolol 25mg q6hrs for rate control   - CHADSVASC score 3, start on heparin gtt for full AC. if can tolerate without severe drop in hgb, switch to DOAC    - Euvolemic on exam  - Check TTE     - BP controlled  - start BB  - continue Losartan, dc amlodipine     - Monitor and replete lytes, keep K>4, Mg>2.  - Will continue to follow.    Daniel Avina NP  Nurse Practitioner- Cardiology   Call TEAMS

## 2023-11-24 NOTE — PROGRESS NOTE ADULT - ASSESSMENT
69 yo male w/ pmh COPD, HTN, HLD, Hypertriglyceridemia, BRIAN, Prediabetes, Anxiety/Depression, PTSD, GERD, BOOKER presents with weakness x2 weeks.  Daughter found him home, blue lips WBC 14.44,   CT Chest: Findings concerning for multifocal pneumonia, most prominent in the right upper lobe. Slightly enlarged mediastinal and right hilar lymph nodes, nonspecific and possibly reactive.    RECOMMENDATIONS  1-PNA compelling story and agree with ATS guideline based Rx  Azithromycin x 3 days started 11/20 changed to PO 11/21 w 11/22 as last day -urine legionella not collected and pt improved so cancelled urine legionella   RVP-neg  Ceftriaxone started 11/20 still on oxygen but exam much improved  fine to finish course with Cefuroxime 500mg PO BID last day 11/24 when pt is ready for discharge    Over the weekend Dr. Smith will be covering for our group.   If you have any questions, concerns or new micro data, please reach out to them at [phone].  Cammie Gomez M.D.  [facility] Division of Infectious Diseases [phone]  For after 5 P.M. and weekends, please call [phone]

## 2023-11-24 NOTE — CASE MANAGEMENT PROGRESS NOTE - NSCMPROGRESSNOTE_GEN_ALL_CORE
Patient discussed in rounds and remains acute on 4L O2, IV antibiotics and HR has been on 130th.  Cardizem adjusted.  O2 script provided. Awaiting MD note for POC need and O2 SATs.  CM remains available throughout hospital stay.     Patient discussed in rounds and remains acute on 4L O2, IV antibiotics and HR has been on 130th.  Cardizem adjusted.  O2 script provided. Awaiting MD note for POC need and O2 SATs. Per MD 86% at rest. CM remains available throughout hospital stay.

## 2023-11-24 NOTE — CONSULT NOTE ADULT - NS ATTEND AMEND GEN_ALL_CORE FT
67yo M w/ PMH of COPD (not on O2), HTN, HLD, Hypertriglyceridemia, BRIAN (hx of needing iron infusions), dabetes, Anxiety/Depression, PTSD, GERD, BRANDON admitted for multifocal pneumonia and anemia c/b Afib with RVR.  lethargic on my exam. likely has underlying brandon. denies any palp.     - New onset, up to 140 bpm, reported CARTER, likely in the setting of PNA  - start on metoprolol 25mg q6hrs for rate control   - CHADSVASC score 3, start on heparin gtt for full AC. if can tolerate without severe drop in hgb, switch to DOAC    - Euvolemic on exam  - Check TTE   - continue Losartan, dc amlodipine     outpt sleep eval  pna rx per primary  Monitor and replete electrolytes. Keep K>4.0 and Mg>2.0.  Further cardiac workup will depend on clinical course.

## 2023-11-24 NOTE — PROGRESS NOTE ADULT - PROBLEM SELECTOR PLAN 1
-h/o bronchitis 1 month ago treated with two rounds of abx and steroids  -CT Chest: Findings concerning for multifocal pneumonia, most prominent in the right upper lobe. Slightly enlarged mediastinal and right hilar lymph nodes, nonspecific and possibly reactive.  -WBC 14.44, Neutrophils 83.5% on admission -- leukocytosis now resolving  -Continue Rocephin; completed 3-day course of Azithromycin  -taper off NC  -Monitor VS, trend WBC  -pulm, Dr Funes consulted, recs appreciated  -ID, Dr Mart consulted recs appreciated -h/o bronchitis 1 month ago treated with two rounds of abx and steroids  -CT Chest: Findings concerning for multifocal pneumonia, most prominent in the right upper lobe. Slightly enlarged mediastinal and right hilar lymph nodes, nonspecific and possibly reactive.  -WBC 14.44, Neutrophils 83.5% on admission -- leukocytosis now 12.32  -Continue Rocephin; completed 3-day course of Azithromycin  -taper off NC  -Monitor VS, trend WBC  -pulm, Dr Funes consulted, recs appreciated  -ID, Dr Mart consulted recs appreciated -h/o bronchitis 1 month ago treated with two rounds of abx and steroids  -CT Chest: Findings concerning for multifocal pneumonia, most prominent in the right upper lobe. Slightly enlarged mediastinal and right hilar lymph nodes, nonspecific and possibly reactive.  -WBC 14.44, Neutrophils 83.5% on admission -- leukocytosis now 12.32, though pt has been on prednisone this admission, making it difficult to gauge the significance of the WBC elevation  -Continue Rocephin; completed 3-day course of Azithromycin  -taper off NC  -Monitor VS, trend WBC  -pulm, Dr Funes consulted, recs appreciated  -ID, Dr Mart consulted recs appreciated

## 2023-11-24 NOTE — PROGRESS NOTE ADULT - ASSESSMENT
69 yo male w/ pmh COPD, HTN, HLD, Hypertriglyceridemia, BRIAN, Prediabetes, Anxiety/Depression, PTSD, GERD, BRANDON presents with weakness    copd  pna  brandon  HTN  HLD  DM  Anxiety  Depression  GERD  PTSD    wean fio2  vs noted  out of bed  check RA sat    low dose steroids and nebs for copd  VBG noted  ct imaging reviewed - eval for PNA  emp ABX  biomarkers - cx -   cough rx regimen  DM care  serial FS  BRANDON - does not use CPAP - sleep hygiene reviewed  PPI for GERD  anxiolysis  emotional support  monitor VS and Sat

## 2023-11-24 NOTE — PROGRESS NOTE ADULT - SUBJECTIVE AND OBJECTIVE BOX
Patient is a 68y old  Male who presents with a chief complaint of Pneumonia (24 Nov 2023 05:29)      INTERVAL HPI/OVERNIGHT EVENTS: Patient seen and examined at bedside. Patient was sinus tachycardia to 130-150s and converted to afib in am. Patient first dose of lopressor 5mg was not given as iv leaked, 5mg lopressor was reordered. Patient has no complaints at this time. Denies fevers, chills, headache, lightheadedness, chest pain, dyspnea, abdominal pain, n/v/d/c.    MEDICATIONS  (STANDING):  albuterol/ipratropium for Nebulization 3 milliLiter(s) Nebulizer every 6 hours  amLODIPine   Tablet 10 milliGRAM(s) Oral daily  ARIPiprazole 20 milliGRAM(s) Oral daily  atorvastatin 20 milliGRAM(s) Oral at bedtime  budesonide 160 MICROgram(s)/formoterol 4.5 MICROgram(s) Inhaler 2 Puff(s) Inhalation two times a day  buPROPion XL (24-Hour) . 300 milliGRAM(s) Oral daily  cefTRIAXone   IVPB 1000 milliGRAM(s) IV Intermittent every 24 hours  dextrose 5%. 1000 milliLiter(s) (100 mL/Hr) IV Continuous <Continuous>  dextrose 5%. 1000 milliLiter(s) (50 mL/Hr) IV Continuous <Continuous>  dextrose 50% Injectable 25 Gram(s) IV Push once  dextrose 50% Injectable 25 Gram(s) IV Push once  dextrose 50% Injectable 12.5 Gram(s) IV Push once  famotidine    Tablet 20 milliGRAM(s) Oral two times a day  glucagon  Injectable 1 milliGRAM(s) IntraMuscular once  insulin lispro (ADMELOG) corrective regimen sliding scale   SubCutaneous three times a day before meals  insulin lispro (ADMELOG) corrective regimen sliding scale   SubCutaneous at bedtime  iron sucrose Injectable 100 milliGRAM(s) IV Push every 24 hours  LORazepam     Tablet 1.5 milliGRAM(s) Oral <User Schedule>  LORazepam     Tablet 1 milliGRAM(s) Oral <User Schedule>  losartan 25 milliGRAM(s) Oral daily  pantoprazole    Tablet 40 milliGRAM(s) Oral every 12 hours  polyethylene glycol 3350 17 Gram(s) Oral two times a day  predniSONE   Tablet 20 milliGRAM(s) Oral daily  sertraline 200 milliGRAM(s) Oral daily  traZODone 150 milliGRAM(s) Oral at bedtime    MEDICATIONS  (PRN):  acetaminophen     Tablet .. 650 milliGRAM(s) Oral every 6 hours PRN Temp greater or equal to 38C (100.4F), Mild Pain (1 - 3)  albuterol    90 MICROgram(s) HFA Inhaler 2 Puff(s) Inhalation every 6 hours PRN for bronchospasm  aluminum hydroxide/magnesium hydroxide/simethicone Suspension 30 milliLiter(s) Oral every 4 hours PRN Dyspepsia  dextrose Oral Gel 15 Gram(s) Oral once PRN Blood Glucose LESS THAN 70 milliGRAM(s)/deciliter  guaiFENesin Oral Liquid (Sugar-Free) 200 milliGRAM(s) Oral every 6 hours PRN Cough  melatonin 3 milliGRAM(s) Oral at bedtime PRN Insomnia  ondansetron Injectable 4 milliGRAM(s) IV Push every 8 hours PRN Nausea and/or Vomiting      Allergies    No Known Drug Allergies  latex (Rash)    Intolerances        REVIEW OF SYSTEMS:  CONSTITUTIONAL: No fever or chills  HEENT:  No headache, no sore throat  RESPIRATORY: No cough, wheezing, or shortness of breath  CARDIOVASCULAR: No chest pain, palpitations  GASTROINTESTINAL: No abd pain, nausea, vomiting, or diarrhea  GENITOURINARY: No dysuria, frequency, or hematuria  NEUROLOGICAL: no focal weakness or dizziness  MUSCULOSKELETAL: no myalgias     Vital Signs Last 24 Hrs  T(C): 37 (24 Nov 2023 05:46), Max: 37 (24 Nov 2023 05:46)  T(F): 98.6 (24 Nov 2023 05:46), Max: 98.6 (24 Nov 2023 05:46)  HR: 98 (24 Nov 2023 05:46) (70 - 98)  BP: 125/85 (24 Nov 2023 06:58) (125/85 - 155/72)  BP(mean): --  RR: 18 (24 Nov 2023 05:46) (17 - 18)  SpO2: 93% (24 Nov 2023 06:58) (91% - 98%)    Parameters below as of 24 Nov 2023 06:58  Patient On (Oxygen Delivery Method): nasal cannula  O2 Flow (L/min): 4      PHYSICAL EXAM:  GENERAL: NAD  HEENT:  anicteric, moist mucous membranes  CHEST/LUNG:  CTA b/l, no rales, wheezes, or rhonchi  HEART:  IRR, S1, S2  ABDOMEN:  BS+, soft, nontender, nondistended  EXTREMITIES: no edema, cyanosis, or calf tenderness  NERVOUS SYSTEM: answers questions and follows commands appropriately    LABS:                        8.5    12.32 )-----------( 259      ( 24 Nov 2023 07:00 )             29.9     CBC Full  -  ( 24 Nov 2023 07:00 )  WBC Count : 12.32 K/uL  Hemoglobin : 8.5 g/dL  Hematocrit : 29.9 %  Platelet Count - Automated : 259 K/uL  Mean Cell Volume : 78.9 fl  Mean Cell Hemoglobin : 22.4 pg  Mean Cell Hemoglobin Concentration : 28.4 gm/dL  Auto Neutrophil # : x  Auto Lymphocyte # : x  Auto Monocyte # : x  Auto Eosinophil # : x  Auto Basophil # : x  Auto Neutrophil % : x  Auto Lymphocyte % : x  Auto Monocyte % : x  Auto Eosinophil % : x  Auto Basophil % : x      Ca    8.7        23 Nov 2023 06:43        Urinalysis Basic - ( 23 Nov 2023 06:43 )    Color: x / Appearance: x / SG: x / pH: x  Gluc: 113 mg/dL / Ketone: x  / Bili: x / Urobili: x   Blood: x / Protein: x / Nitrite: x   Leuk Esterase: x / RBC: x / WBC x   Sq Epi: x / Non Sq Epi: x / Bacteria: x      CAPILLARY BLOOD GLUCOSE      POCT Blood Glucose.: 150 mg/dL (24 Nov 2023 07:54)  POCT Blood Glucose.: 122 mg/dL (23 Nov 2023 21:29)  POCT Blood Glucose.: 209 mg/dL (23 Nov 2023 16:38)  POCT Blood Glucose.: 153 mg/dL (23 Nov 2023 11:57)        Culture - Blood (collected 11-20-23 @ 09:20)  Source: .Blood Blood-Peripheral  Preliminary Report (11-23-23 @ 12:01):    No growth at 72 Hours    Culture - Blood (collected 11-20-23 @ 09:15)  Source: .Blood Blood-Peripheral  Preliminary Report (11-23-23 @ 12:01):    No growth at 72 Hours        RADIOLOGY & ADDITIONAL TESTS:    Personally reviewed.     Consultant(s) Notes Reviewed:  [x] YES  [ ] NO     Patient is a 68y old  Male who presents with a chief complaint of SOB, cough.      INTERVAL HPI/OVERNIGHT EVENTS: Patient seen and examined at bedside. Patient was became tachycardic to 140s this morning --- noted to be in new-onset afib w/ RVR. Lopressor 5mg IV given. Patient has no complaints at this time. Denies fevers, chills, headache, lightheadedness, chest pain, dyspnea, palpitations, abdominal pain, n/v/d/c.    MEDICATIONS  (STANDING):  albuterol/ipratropium for Nebulization 3 milliLiter(s) Nebulizer every 6 hours  amLODIPine   Tablet 10 milliGRAM(s) Oral daily  ARIPiprazole 20 milliGRAM(s) Oral daily  atorvastatin 20 milliGRAM(s) Oral at bedtime  budesonide 160 MICROgram(s)/formoterol 4.5 MICROgram(s) Inhaler 2 Puff(s) Inhalation two times a day  buPROPion XL (24-Hour) . 300 milliGRAM(s) Oral daily  cefTRIAXone   IVPB 1000 milliGRAM(s) IV Intermittent every 24 hours  dextrose 5%. 1000 milliLiter(s) (100 mL/Hr) IV Continuous <Continuous>  dextrose 5%. 1000 milliLiter(s) (50 mL/Hr) IV Continuous <Continuous>  dextrose 50% Injectable 25 Gram(s) IV Push once  dextrose 50% Injectable 25 Gram(s) IV Push once  dextrose 50% Injectable 12.5 Gram(s) IV Push once  famotidine    Tablet 20 milliGRAM(s) Oral two times a day  glucagon  Injectable 1 milliGRAM(s) IntraMuscular once  insulin lispro (ADMELOG) corrective regimen sliding scale   SubCutaneous three times a day before meals  insulin lispro (ADMELOG) corrective regimen sliding scale   SubCutaneous at bedtime  iron sucrose Injectable 100 milliGRAM(s) IV Push every 24 hours  LORazepam     Tablet 1.5 milliGRAM(s) Oral <User Schedule>  LORazepam     Tablet 1 milliGRAM(s) Oral <User Schedule>  losartan 25 milliGRAM(s) Oral daily  pantoprazole    Tablet 40 milliGRAM(s) Oral every 12 hours  polyethylene glycol 3350 17 Gram(s) Oral two times a day  predniSONE   Tablet 20 milliGRAM(s) Oral daily  sertraline 200 milliGRAM(s) Oral daily  traZODone 150 milliGRAM(s) Oral at bedtime    MEDICATIONS  (PRN):  acetaminophen     Tablet .. 650 milliGRAM(s) Oral every 6 hours PRN Temp greater or equal to 38C (100.4F), Mild Pain (1 - 3)  albuterol    90 MICROgram(s) HFA Inhaler 2 Puff(s) Inhalation every 6 hours PRN for bronchospasm  aluminum hydroxide/magnesium hydroxide/simethicone Suspension 30 milliLiter(s) Oral every 4 hours PRN Dyspepsia  dextrose Oral Gel 15 Gram(s) Oral once PRN Blood Glucose LESS THAN 70 milliGRAM(s)/deciliter  guaiFENesin Oral Liquid (Sugar-Free) 200 milliGRAM(s) Oral every 6 hours PRN Cough  melatonin 3 milliGRAM(s) Oral at bedtime PRN Insomnia  ondansetron Injectable 4 milliGRAM(s) IV Push every 8 hours PRN Nausea and/or Vomiting      Allergies    No Known Drug Allergies  latex (Rash)    Intolerances        REVIEW OF SYSTEMS:  CONSTITUTIONAL: No fever or chills  HEENT:  No headache, no sore throat  RESPIRATORY: No cough, wheezing, or shortness of breath  CARDIOVASCULAR: No chest pain, palpitations  GASTROINTESTINAL: No abd pain, nausea, vomiting, or diarrhea  GENITOURINARY: No dysuria, frequency, or hematuria  NEUROLOGICAL: no focal weakness or dizziness  MUSCULOSKELETAL: no myalgias     Vital Signs Last 24 Hrs  T(C): 37 (24 Nov 2023 05:46), Max: 37 (24 Nov 2023 05:46)  T(F): 98.6 (24 Nov 2023 05:46), Max: 98.6 (24 Nov 2023 05:46)  HR: 98 (24 Nov 2023 05:46) (70 - 98)  BP: 125/85 (24 Nov 2023 06:58) (125/85 - 155/72)  BP(mean): --  RR: 18 (24 Nov 2023 05:46) (17 - 18)  SpO2: 93% (24 Nov 2023 06:58) (91% - 98%)    Parameters below as of 24 Nov 2023 06:58  Patient On (Oxygen Delivery Method): nasal cannula  O2 Flow (L/min): 4      PHYSICAL EXAM:  GENERAL: NAD at rest, obese  HEENT:  anicteric, moist mucous membranes  CHEST/LUNG:  decreased breath sounds; no rales, wheezes, or rhonchi appreciated  HEART:  irregular at 128bpm, S1, S2  ABDOMEN:  BS+, soft, nontender, nondistended  EXTREMITIES: no edema, cyanosis, or calf tenderness  NERVOUS SYSTEM: answers questions and follows commands appropriately    LABS:                        8.5    12.32 )-----------( 259      ( 24 Nov 2023 07:00 )             29.9     CBC Full  -  ( 24 Nov 2023 07:00 )  WBC Count : 12.32 K/uL  Hemoglobin : 8.5 g/dL  Hematocrit : 29.9 %  Platelet Count - Automated : 259 K/uL  Mean Cell Volume : 78.9 fl  Mean Cell Hemoglobin : 22.4 pg  Mean Cell Hemoglobin Concentration : 28.4 gm/dL  Auto Neutrophil # : x  Auto Lymphocyte # : x  Auto Monocyte # : x  Auto Eosinophil # : x  Auto Basophil # : x  Auto Neutrophil % : x  Auto Lymphocyte % : x  Auto Monocyte % : x  Auto Eosinophil % : x  Auto Basophil % : x      11-24    140  |  102  |  12  ----------------------------<  135<H>  3.9   |  34<H>  |  0.83    Ca    8.4<L>      24 Nov 2023 07:00  Phos  3.3     11-23  Mg     2.1     11-23    TPro  7.1  /  Alb  3.2<L>  /  TBili  0.3  /  DBili  x   /  AST  33  /  ALT  63  /  AlkPhos  84  11-23    Urinalysis Basic - ( 23 Nov 2023 06:43 )    Color: x / Appearance: x / SG: x / pH: x  Gluc: 113 mg/dL / Ketone: x  / Bili: x / Urobili: x   Blood: x / Protein: x / Nitrite: x   Leuk Esterase: x / RBC: x / WBC x   Sq Epi: x / Non Sq Epi: x / Bacteria: x      CAPILLARY BLOOD GLUCOSE      POCT Blood Glucose.: 150 mg/dL (24 Nov 2023 07:54)  POCT Blood Glucose.: 122 mg/dL (23 Nov 2023 21:29)  POCT Blood Glucose.: 209 mg/dL (23 Nov 2023 16:38)  POCT Blood Glucose.: 153 mg/dL (23 Nov 2023 11:57)        Culture - Blood (collected 11-20-23 @ 09:20)  Source: .Blood Blood-Peripheral  Preliminary Report (11-23-23 @ 12:01):    No growth at 72 Hours    Culture - Blood (collected 11-20-23 @ 09:15)  Source: .Blood Blood-Peripheral  Preliminary Report (11-23-23 @ 12:01):    No growth at 72 Hours        RADIOLOGY & ADDITIONAL TESTS:    Personally reviewed.     Consultant(s) Notes Reviewed:  [x] YES  [ ] NO

## 2023-11-24 NOTE — CONSULT NOTE ADULT - SUBJECTIVE AND OBJECTIVE BOX
[facility] - Trixie Durand,  Jaswinder, Boom Duong, Anais, Kaz Paz  Office Number: [phone]    Initial Consult Note    CHIEF COMPLAINT: Patient is a 68y old  Male who presents with a chief complaint of Pneumonia (24 Nov 2023 08:43)      HPI:  69 yo male w/ pmh COPD, HTN, HLD, Hypertriglyceridemia, BRIAN, Prediabetes, Anxiety/Depression, PTSD, GERD, BOOKER presents with weakness x2 weeks. HPI aided by daughter as patient is a poor historian. States he had bronchitis about 1 month ago that was treated outpatient with "two rounds of antibiotics" and steroids. The patient went hunting 2 days ago [facility] and states that he fell asleep multiple times while on the trip. Denies any tick bites within the past month. Pt states that he has had an occasional cough, not increased from his baseline cough with his COPD. Endorses having to receive iron supplementation in the past due to his BRIAN. Pt denies any fever, chills, sob, cp, palpitations, dizziness, arthralgia, vision change, abd pain, n/v/d/c.    ED course  Vitals: T 97.6, HR 71, /94, RR 18, SpO2 99% on RA  Significant labs: WBC 14.44, Neutrohil 83.5%, HgB/Hct 7.8/27.4, AST 69, , ABG: ph 7.40, pCO2 58, pO2 92, HCO3 36, Base Excess, Arterial 11.1, Oxygen Saturation 99.1  Imaging: CT Chest: Findings concerning for multifocal pneumonia, most prominent in the right upper lobe.Slightly enlarged mediastinal and right hilar lymph nodes, nonspecific and possibly reactive.  EKG: pending  In ED patient given: Azithromycin, Ceftriaxone      On 11/24, noted to be tachycardic. EKG showed new Afib with RVR. s/p Metoprolol 5mg IVP with improvement of HR to 90s. Pt denied any palpitations, chest pain during episode. Pt with SOB and cough, being treated for PNA.     PAST MEDICAL & SURGICAL HISTORY:  Hypertension      High cholesterol      COPD (chronic obstructive pulmonary disease)      GERD (gastroesophageal reflux disease)      PTSD (post-traumatic stress disorder)  September 11th 2001      Osteoarthrosis, localized      Sleep apnea  does not use CPAP machine      Prediabetes      Iron deficiency anemia      S/P knee replacement, right      S/P tonsillectomy and adenoidectomy      S/P wrist surgery  ganglion cyst      S/P arthroscopic surgery of left knee        SOCIAL HISTORY:  No tobacco, ethanol, or drug abuse.  FAMILY HISTORY:  FH: type 2 diabetes (Father)    FH: colon cancer (Father)      No family history of acute MI or sudden cardiac death.  MEDICATIONS  (STANDING):  albuterol/ipratropium for Nebulization 3 milliLiter(s) Nebulizer every 6 hours  amLODIPine   Tablet 10 milliGRAM(s) Oral daily  ARIPiprazole 20 milliGRAM(s) Oral daily  atorvastatin 20 milliGRAM(s) Oral at bedtime  budesonide 160 MICROgram(s)/formoterol 4.5 MICROgram(s) Inhaler 2 Puff(s) Inhalation two times a day  buPROPion XL (24-Hour) . 300 milliGRAM(s) Oral daily  cefTRIAXone   IVPB 1000 milliGRAM(s) IV Intermittent every 24 hours  dextrose 5%. 1000 milliLiter(s) (100 mL/Hr) IV Continuous <Continuous>  dextrose 5%. 1000 milliLiter(s) (50 mL/Hr) IV Continuous <Continuous>  dextrose 50% Injectable 25 Gram(s) IV Push once  dextrose 50% Injectable 25 Gram(s) IV Push once  dextrose 50% Injectable 12.5 Gram(s) IV Push once  famotidine    Tablet 20 milliGRAM(s) Oral two times a day  glucagon  Injectable 1 milliGRAM(s) IntraMuscular once  insulin lispro (ADMELOG) corrective regimen sliding scale   SubCutaneous three times a day before meals  insulin lispro (ADMELOG) corrective regimen sliding scale   SubCutaneous at bedtime  iron sucrose Injectable 100 milliGRAM(s) IV Push every 24 hours  LORazepam     Tablet 1 milliGRAM(s) Oral <User Schedule>  LORazepam     Tablet 1.5 milliGRAM(s) Oral <User Schedule>  losartan 25 milliGRAM(s) Oral daily  pantoprazole    Tablet 40 milliGRAM(s) Oral every 12 hours  polyethylene glycol 3350 17 Gram(s) Oral two times a day  predniSONE   Tablet 20 milliGRAM(s) Oral daily  sertraline 200 milliGRAM(s) Oral daily  traZODone 150 milliGRAM(s) Oral at bedtime    MEDICATIONS  (PRN):  acetaminophen     Tablet .. 650 milliGRAM(s) Oral every 6 hours PRN Temp greater or equal to 38C (100.4F), Mild Pain (1 - 3)  albuterol    90 MICROgram(s) HFA Inhaler 2 Puff(s) Inhalation every 6 hours PRN for bronchospasm  aluminum hydroxide/magnesium hydroxide/simethicone Suspension 30 milliLiter(s) Oral every 4 hours PRN Dyspepsia  dextrose Oral Gel 15 Gram(s) Oral once PRN Blood Glucose LESS THAN 70 milliGRAM(s)/deciliter  guaiFENesin Oral Liquid (Sugar-Free) 200 milliGRAM(s) Oral every 6 hours PRN Cough  melatonin 3 milliGRAM(s) Oral at bedtime PRN Insomnia  ondansetron Injectable 4 milliGRAM(s) IV Push every 8 hours PRN Nausea and/or Vomiting    Allergies    No Known Drug Allergies  latex (Rash)    Intolerances      REVIEW OF SYSTEMS:  All other review of systems is negative unless indicated above  VITAL SIGNS:   Vital Signs Last 24 Hrs  T(C): 37 (24 Nov 2023 05:46), Max: 37 (24 Nov 2023 05:46)  T(F): 98.6 (24 Nov 2023 05:46), Max: 98.6 (24 Nov 2023 05:46)  HR: 98 (24 Nov 2023 05:46) (70 - 98)  BP: 125/85 (24 Nov 2023 06:58) (125/85 - 155/72)  BP(mean): --  RR: 18 (24 Nov 2023 05:46) (17 - 18)  SpO2: 93% (24 Nov 2023 06:58) (91% - 98%)    Parameters below as of 24 Nov 2023 06:58  Patient On (Oxygen Delivery Method): nasal cannula  O2 Flow (L/min): 4    I&O's Summary    23 Nov 2023 07:01  -  24 Nov 2023 07:00  --------------------------------------------------------  IN: 1270 mL / OUT: 600 mL / NET: 670 mL      On Exam:  Constitutional: NAD, alert and oriented x 3  Lungs:  Non-labored, breath sounds are clear bilaterally, No wheezing, rales or rhonchi  Cardiovascular: IRRR. tachycardic, S1 and S2 positive.  No murmurs, rubs, gallops or clicks  Gastrointestinal: Bowel Sounds present, soft, nontender.   Extremities: No peripheral edema.   Neurological: Alert, no focal deficits  Skin: No rashes or ulcers   Psych:  Mood & affect appropriate.    LABS: All Labs Reviewed:                        8.5    12.32 )-----------( 259      ( 24 Nov 2023 07:00 )             29.9                         7.4    10.28 )-----------( 271      ( 23 Nov 2023 06:43 )             26.1                         7.6    10.41 )-----------( 258      ( 22 Nov 2023 09:19 )             26.7     24 Nov 2023 07:00    140    |  102    |  12     ----------------------------<  135    3.9     |  34     |  0.83   23 Nov 2023 06:43    143    |  104    |  14     ----------------------------<  113    3.8     |  32     |  0.92   22 Nov 2023 09:19    140    |  103    |  16     ----------------------------<  237    4.0     |  29     |  0.99     Ca    8.4        24 Nov 2023 07:00  Ca    8.7        23 Nov 2023 06:43  Ca    8.6        22 Nov 2023 09:19  Phos  3.3       23 Nov 2023 06:43  Mg     2.1       23 Nov 2023 06:43    TPro  7.1    /  Alb  3.2    /  TBili  0.3    /  DBili  x      /  AST  33     /  ALT  63     /  AlkPhos  84     23 Nov 2023 06:43          Blood Culture: Organism --  Gram Stain Blood -- Gram Stain --  Specimen Source .Blood Blood-Peripheral  Culture-Blood --    Organism --  Gram Stain Blood -- Gram Stain --  Specimen Source .Blood Blood-Peripheral  Culture-Blood --            RADIOLOGY:    EKG:       [facility] - Trixie Durand,  Jaswinder, Boom Duong, Anais, Kaz Paz  Office Number: [phone]    Initial Consult Note    CHIEF COMPLAINT: Patient is a 68y old  Male who presents with a chief complaint of Pneumonia (24 Nov 2023 08:43)      HPI:  67 yo male w/ pmh COPD, HTN, HLD, Hypertriglyceridemia, BRIAN, Prediabetes, Anxiety/Depression, PTSD, GERD, BOOKER presents with weakness x2 weeks. HPI aided by daughter as patient is a poor historian. States he had bronchitis about 1 month ago that was treated outpatient with "two rounds of antibiotics" and steroids. The patient went hunting 2 days ago [facility] and states that he fell asleep multiple times while on the trip. Denies any tick bites within the past month. Pt states that he has had an occasional cough, not increased from his baseline cough with his COPD. Endorses having to receive iron supplementation in the past due to his BRIAN. Pt denies any fever, chills, sob, cp, palpitations, dizziness, arthralgia, vision change, abd pain, n/v/d/c.    ED course  Vitals: T 97.6, HR 71, /94, RR 18, SpO2 99% on RA  Significant labs: WBC 14.44, Neutrohil 83.5%, HgB/Hct 7.8/27.4, AST 69, , ABG: ph 7.40, pCO2 58, pO2 92, HCO3 36, Base Excess, Arterial 11.1, Oxygen Saturation 99.1  Imaging: CT Chest: Findings concerning for multifocal pneumonia, most prominent in the right upper lobe.Slightly enlarged mediastinal and right hilar lymph nodes, nonspecific and possibly reactive.  EKG: pending  In ED patient given: Azithromycin, Ceftriaxone      On 11/24, noted to be tachycardic. EKG showed new Afib with RVR. s/p Metoprolol 5mg IVP with improvement of HR to 90s. Pt denied any palpitations, chest pain or SOB. He denied any GIB or recent stroke.     PAST MEDICAL & SURGICAL HISTORY:  Hypertension      High cholesterol      COPD (chronic obstructive pulmonary disease)      GERD (gastroesophageal reflux disease)      PTSD (post-traumatic stress disorder)  September 11th 2001      Osteoarthrosis, localized      Sleep apnea  does not use CPAP machine      Prediabetes      Iron deficiency anemia      S/P knee replacement, right      S/P tonsillectomy and adenoidectomy      S/P wrist surgery  ganglion cyst      S/P arthroscopic surgery of left knee        SOCIAL HISTORY:  No tobacco, ethanol, or drug abuse.  FAMILY HISTORY:  FH: type 2 diabetes (Father)    FH: colon cancer (Father)      No family history of acute MI or sudden cardiac death.  MEDICATIONS  (STANDING):  albuterol/ipratropium for Nebulization 3 milliLiter(s) Nebulizer every 6 hours  amLODIPine   Tablet 10 milliGRAM(s) Oral daily  ARIPiprazole 20 milliGRAM(s) Oral daily  atorvastatin 20 milliGRAM(s) Oral at bedtime  budesonide 160 MICROgram(s)/formoterol 4.5 MICROgram(s) Inhaler 2 Puff(s) Inhalation two times a day  buPROPion XL (24-Hour) . 300 milliGRAM(s) Oral daily  cefTRIAXone   IVPB 1000 milliGRAM(s) IV Intermittent every 24 hours  dextrose 5%. 1000 milliLiter(s) (100 mL/Hr) IV Continuous <Continuous>  dextrose 5%. 1000 milliLiter(s) (50 mL/Hr) IV Continuous <Continuous>  dextrose 50% Injectable 25 Gram(s) IV Push once  dextrose 50% Injectable 25 Gram(s) IV Push once  dextrose 50% Injectable 12.5 Gram(s) IV Push once  famotidine    Tablet 20 milliGRAM(s) Oral two times a day  glucagon  Injectable 1 milliGRAM(s) IntraMuscular once  insulin lispro (ADMELOG) corrective regimen sliding scale   SubCutaneous three times a day before meals  insulin lispro (ADMELOG) corrective regimen sliding scale   SubCutaneous at bedtime  iron sucrose Injectable 100 milliGRAM(s) IV Push every 24 hours  LORazepam     Tablet 1 milliGRAM(s) Oral <User Schedule>  LORazepam     Tablet 1.5 milliGRAM(s) Oral <User Schedule>  losartan 25 milliGRAM(s) Oral daily  pantoprazole    Tablet 40 milliGRAM(s) Oral every 12 hours  polyethylene glycol 3350 17 Gram(s) Oral two times a day  predniSONE   Tablet 20 milliGRAM(s) Oral daily  sertraline 200 milliGRAM(s) Oral daily  traZODone 150 milliGRAM(s) Oral at bedtime    MEDICATIONS  (PRN):  acetaminophen     Tablet .. 650 milliGRAM(s) Oral every 6 hours PRN Temp greater or equal to 38C (100.4F), Mild Pain (1 - 3)  albuterol    90 MICROgram(s) HFA Inhaler 2 Puff(s) Inhalation every 6 hours PRN for bronchospasm  aluminum hydroxide/magnesium hydroxide/simethicone Suspension 30 milliLiter(s) Oral every 4 hours PRN Dyspepsia  dextrose Oral Gel 15 Gram(s) Oral once PRN Blood Glucose LESS THAN 70 milliGRAM(s)/deciliter  guaiFENesin Oral Liquid (Sugar-Free) 200 milliGRAM(s) Oral every 6 hours PRN Cough  melatonin 3 milliGRAM(s) Oral at bedtime PRN Insomnia  ondansetron Injectable 4 milliGRAM(s) IV Push every 8 hours PRN Nausea and/or Vomiting    Allergies    No Known Drug Allergies  latex (Rash)    Intolerances      REVIEW OF SYSTEMS:  All other review of systems is negative unless indicated above  VITAL SIGNS:   Vital Signs Last 24 Hrs  T(C): 37 (24 Nov 2023 05:46), Max: 37 (24 Nov 2023 05:46)  T(F): 98.6 (24 Nov 2023 05:46), Max: 98.6 (24 Nov 2023 05:46)  HR: 98 (24 Nov 2023 05:46) (70 - 98)  BP: 125/85 (24 Nov 2023 06:58) (125/85 - 155/72)  BP(mean): --  RR: 18 (24 Nov 2023 05:46) (17 - 18)  SpO2: 93% (24 Nov 2023 06:58) (91% - 98%)    Parameters below as of 24 Nov 2023 06:58  Patient On (Oxygen Delivery Method): nasal cannula  O2 Flow (L/min): 4    I&O's Summary    23 Nov 2023 07:01  -  24 Nov 2023 07:00  --------------------------------------------------------  IN: 1270 mL / OUT: 600 mL / NET: 670 mL      On Exam:  Constitutional: NAD, alert and oriented x 3  Lungs:  Non-labored, breath sounds are clear bilaterally, No wheezing, rales or rhonchi  Cardiovascular: IRRR. tachycardic, S1 and S2 positive.  No murmurs, rubs, gallops or clicks  Gastrointestinal: Bowel Sounds present, soft, nontender.   Extremities: No peripheral edema.   Neurological: Alert, no focal deficits  Skin: No rashes or ulcers   Psych:  Mood & affect appropriate.    LABS: All Labs Reviewed:                        8.5    12.32 )-----------( 259      ( 24 Nov 2023 07:00 )             29.9                         7.4    10.28 )-----------( 271      ( 23 Nov 2023 06:43 )             26.1                         7.6    10.41 )-----------( 258      ( 22 Nov 2023 09:19 )             26.7     24 Nov 2023 07:00    140    |  102    |  12     ----------------------------<  135    3.9     |  34     |  0.83   23 Nov 2023 06:43    143    |  104    |  14     ----------------------------<  113    3.8     |  32     |  0.92   22 Nov 2023 09:19    140    |  103    |  16     ----------------------------<  237    4.0     |  29     |  0.99     Ca    8.4        24 Nov 2023 07:00  Ca    8.7        23 Nov 2023 06:43  Ca    8.6        22 Nov 2023 09:19  Phos  3.3       23 Nov 2023 06:43  Mg     2.1       23 Nov 2023 06:43    TPro  7.1    /  Alb  3.2    /  TBili  0.3    /  DBili  x      /  AST  33     /  ALT  63     /  AlkPhos  84     23 Nov 2023 06:43          Blood Culture: Organism --  Gram Stain Blood -- Gram Stain --  Specimen Source .Blood Blood-Peripheral  Culture-Blood --    Organism --  Gram Stain Blood -- Gram Stain --  Specimen Source .Blood Blood-Peripheral  Culture-Blood --            RADIOLOGY:    EKG:       [facility] - Trixie Durand,  Jaswinder, Boom Duong, Anais, Kaz Paz  Office Number: [phone]    Initial Consult Note    CHIEF COMPLAINT: Patient is a 68y old  Male who presents with a chief complaint of Pneumonia (24 Nov 2023 08:43)      HPI:  67 yo male w/ pmh COPD, HTN, HLD, Hypertriglyceridemia, BRIAN, Prediabetes, Anxiety/Depression, PTSD, GERD, BOOKER presents with weakness x2 weeks. HPI aided by daughter as patient is a poor historian. States he had bronchitis about 1 month ago that was treated outpatient with "two rounds of antibiotics" and steroids. The patient went hunting 2 days ago [facility] and states that he fell asleep multiple times while on the trip. Denies any tick bites within the past month. Pt states that he has had an occasional cough, not increased from his baseline cough with his COPD. Endorses having to receive iron supplementation in the past due to his BRIAN. Pt denies any fever, chills, sob, cp, palpitations, dizziness, arthralgia, vision change, abd pain, n/v/d/c.    ED course  Vitals: T 97.6, HR 71, /94, RR 18, SpO2 99% on RA  Significant labs: WBC 14.44, Neutrohil 83.5%, HgB/Hct 7.8/27.4, AST 69, , ABG: ph 7.40, pCO2 58, pO2 92, HCO3 36, Base Excess, Arterial 11.1, Oxygen Saturation 99.1  Imaging: CT Chest: Findings concerning for multifocal pneumonia, most prominent in the right upper lobe.Slightly enlarged mediastinal and right hilar lymph nodes, nonspecific and possibly reactive.  EKG: pending  In ED patient given: Azithromycin, Ceftriaxone      On 11/24, noted to be tachycardic. EKG showed new Afib with RVR. s/p Metoprolol 5mg IVP with improvement of HR to 90s. Pt denied any palpitations, chest pain or SOB. He denied any GIB or recent stroke.     PAST MEDICAL & SURGICAL HISTORY:  Hypertension      High cholesterol      COPD (chronic obstructive pulmonary disease)      GERD (gastroesophageal reflux disease)      PTSD (post-traumatic stress disorder)  September 11th 2001      Osteoarthrosis, localized      Sleep apnea  does not use CPAP machine      Prediabetes      Iron deficiency anemia      S/P knee replacement, right      S/P tonsillectomy and adenoidectomy      S/P wrist surgery  ganglion cyst      S/P arthroscopic surgery of left knee        SOCIAL HISTORY:  No tobacco, ethanol, or drug abuse.  FAMILY HISTORY:  FH: type 2 diabetes (Father)    FH: colon cancer (Father)      No family history of acute MI or sudden cardiac death.  MEDICATIONS  (STANDING):  albuterol/ipratropium for Nebulization 3 milliLiter(s) Nebulizer every 6 hours  amLODIPine   Tablet 10 milliGRAM(s) Oral daily  ARIPiprazole 20 milliGRAM(s) Oral daily  atorvastatin 20 milliGRAM(s) Oral at bedtime  budesonide 160 MICROgram(s)/formoterol 4.5 MICROgram(s) Inhaler 2 Puff(s) Inhalation two times a day  buPROPion XL (24-Hour) . 300 milliGRAM(s) Oral daily  cefTRIAXone   IVPB 1000 milliGRAM(s) IV Intermittent every 24 hours  dextrose 5%. 1000 milliLiter(s) (100 mL/Hr) IV Continuous <Continuous>  dextrose 5%. 1000 milliLiter(s) (50 mL/Hr) IV Continuous <Continuous>  dextrose 50% Injectable 25 Gram(s) IV Push once  dextrose 50% Injectable 25 Gram(s) IV Push once  dextrose 50% Injectable 12.5 Gram(s) IV Push once  famotidine    Tablet 20 milliGRAM(s) Oral two times a day  glucagon  Injectable 1 milliGRAM(s) IntraMuscular once  insulin lispro (ADMELOG) corrective regimen sliding scale   SubCutaneous three times a day before meals  insulin lispro (ADMELOG) corrective regimen sliding scale   SubCutaneous at bedtime  iron sucrose Injectable 100 milliGRAM(s) IV Push every 24 hours  LORazepam     Tablet 1 milliGRAM(s) Oral <User Schedule>  LORazepam     Tablet 1.5 milliGRAM(s) Oral <User Schedule>  losartan 25 milliGRAM(s) Oral daily  pantoprazole    Tablet 40 milliGRAM(s) Oral every 12 hours  polyethylene glycol 3350 17 Gram(s) Oral two times a day  predniSONE   Tablet 20 milliGRAM(s) Oral daily  sertraline 200 milliGRAM(s) Oral daily  traZODone 150 milliGRAM(s) Oral at bedtime    MEDICATIONS  (PRN):  acetaminophen     Tablet .. 650 milliGRAM(s) Oral every 6 hours PRN Temp greater or equal to 38C (100.4F), Mild Pain (1 - 3)  albuterol    90 MICROgram(s) HFA Inhaler 2 Puff(s) Inhalation every 6 hours PRN for bronchospasm  aluminum hydroxide/magnesium hydroxide/simethicone Suspension 30 milliLiter(s) Oral every 4 hours PRN Dyspepsia  dextrose Oral Gel 15 Gram(s) Oral once PRN Blood Glucose LESS THAN 70 milliGRAM(s)/deciliter  guaiFENesin Oral Liquid (Sugar-Free) 200 milliGRAM(s) Oral every 6 hours PRN Cough  melatonin 3 milliGRAM(s) Oral at bedtime PRN Insomnia  ondansetron Injectable 4 milliGRAM(s) IV Push every 8 hours PRN Nausea and/or Vomiting    Allergies    No Known Drug Allergies  latex (Rash)    Intolerances      REVIEW OF SYSTEMS:  All other review of systems is negative unless indicated above  VITAL SIGNS:   Vital Signs Last 24 Hrs  T(C): 37 (24 Nov 2023 05:46), Max: 37 (24 Nov 2023 05:46)  T(F): 98.6 (24 Nov 2023 05:46), Max: 98.6 (24 Nov 2023 05:46)  HR: 98 (24 Nov 2023 05:46) (70 - 98)  BP: 125/85 (24 Nov 2023 06:58) (125/85 - 155/72)  BP(mean): --  RR: 18 (24 Nov 2023 05:46) (17 - 18)  SpO2: 93% (24 Nov 2023 06:58) (91% - 98%)    Parameters below as of 24 Nov 2023 06:58  Patient On (Oxygen Delivery Method): nasal cannula  O2 Flow (L/min): 4    I&O's Summary    23 Nov 2023 07:01  -  24 Nov 2023 07:00  --------------------------------------------------------  IN: 1270 mL / OUT: 600 mL / NET: 670 mL      On Exam:  Constitutional: NAD, alert and oriented x 3  Lungs:  Decreased breath sounds b/l. No rales, crackles or wheeze appreciated.   Cardiovascular: IRRR. tachycardic, S1 and S2 positive.  No murmurs, rubs, gallops or clicks  Gastrointestinal: Bowel Sounds present, soft, nontender.   Extremities: No peripheral edema.   Neurological: Alert, no focal deficits  Skin: No rashes or ulcers   Psych:  Mood & affect appropriate.    LABS: All Labs Reviewed:                        8.5    12.32 )-----------( 259      ( 24 Nov 2023 07:00 )             29.9                         7.4    10.28 )-----------( 271      ( 23 Nov 2023 06:43 )             26.1                         7.6    10.41 )-----------( 258      ( 22 Nov 2023 09:19 )             26.7     24 Nov 2023 07:00    140    |  102    |  12     ----------------------------<  135    3.9     |  34     |  0.83   23 Nov 2023 06:43    143    |  104    |  14     ----------------------------<  113    3.8     |  32     |  0.92   22 Nov 2023 09:19    140    |  103    |  16     ----------------------------<  237    4.0     |  29     |  0.99     Ca    8.4        24 Nov 2023 07:00  Ca    8.7        23 Nov 2023 06:43  Ca    8.6        22 Nov 2023 09:19  Phos  3.3       23 Nov 2023 06:43  Mg     2.1       23 Nov 2023 06:43    TPro  7.1    /  Alb  3.2    /  TBili  0.3    /  DBili  x      /  AST  33     /  ALT  63     /  AlkPhos  84     23 Nov 2023 06:43          Blood Culture: Organism --  Gram Stain Blood -- Gram Stain --  Specimen Source .Blood Blood-Peripheral  Culture-Blood --    Organism --  Gram Stain Blood -- Gram Stain --  Specimen Source .Blood Blood-Peripheral  Culture-Blood --            RADIOLOGY:    EKG:

## 2023-11-24 NOTE — PROGRESS NOTE ADULT - PROBLEM SELECTOR PLAN 7
increased amlodipine to 10mg daily  will add losartan 25mg po daily especially given new T2DM diagnosis  #HLD  Continue home statin  lipid panel wnl

## 2023-11-24 NOTE — PROGRESS NOTE ADULT - ASSESSMENT
67yo M w/ PMH of COPD (not on O2), HTN, HLD, Hypertriglyceridemia, BRIAN (hx of needing iron infusions), Prediabetes, Anxiety/Depression, PTSD, GERD, BOOKER admitted for multifocal pneumonia and anemia. 69yo M w/ PMH of COPD (not on O2), HTN, HLD, Hypertriglyceridemia, BRIAN (hx of needing iron infusions), Prediabetes, Anxiety/Depression, PTSD, GERD, BOOKER admitted for multifocal pneumonia and anemia, course c/b afib w/ RVR.

## 2023-11-24 NOTE — PROGRESS NOTE ADULT - SUBJECTIVE AND OBJECTIVE BOX
Date/Time Patient Seen:  		  Referring MD:   Data Reviewed	       Patient is a 68y old  Male who presents with a chief complaint of Pneumonia (23 Nov 2023 12:16)      Subjective/HPI     PAST MEDICAL & SURGICAL HISTORY:  Hypertension    High cholesterol    COPD (chronic obstructive pulmonary disease)    Asthma    GERD (gastroesophageal reflux disease)    Chronic rhinosinusitis    PTSD (post-traumatic stress disorder)  September 11th 2001    Osteoarthrosis, localized    Bakers cyst  Bilateral    Sleep apnea  does not use CPAP machine    Prediabetes    Iron deficiency anemia    S/P knee replacement, right    S/P tonsillectomy and adenoidectomy    S/P wrist surgery  ganglion cyst    S/P arthroscopic surgery of left knee    Bakers cyst  Bilateral removal Bakers Cyst          Medication list         MEDICATIONS  (STANDING):  albuterol/ipratropium for Nebulization 3 milliLiter(s) Nebulizer every 6 hours  amLODIPine   Tablet 10 milliGRAM(s) Oral daily  ARIPiprazole 20 milliGRAM(s) Oral daily  atorvastatin 20 milliGRAM(s) Oral at bedtime  budesonide 160 MICROgram(s)/formoterol 4.5 MICROgram(s) Inhaler 2 Puff(s) Inhalation two times a day  buPROPion XL (24-Hour) . 300 milliGRAM(s) Oral daily  cefTRIAXone   IVPB 1000 milliGRAM(s) IV Intermittent every 24 hours  dextrose 5%. 1000 milliLiter(s) (50 mL/Hr) IV Continuous <Continuous>  dextrose 5%. 1000 milliLiter(s) (100 mL/Hr) IV Continuous <Continuous>  dextrose 50% Injectable 12.5 Gram(s) IV Push once  dextrose 50% Injectable 25 Gram(s) IV Push once  dextrose 50% Injectable 25 Gram(s) IV Push once  famotidine    Tablet 20 milliGRAM(s) Oral two times a day  glucagon  Injectable 1 milliGRAM(s) IntraMuscular once  insulin lispro (ADMELOG) corrective regimen sliding scale   SubCutaneous at bedtime  insulin lispro (ADMELOG) corrective regimen sliding scale   SubCutaneous three times a day before meals  iron sucrose Injectable 100 milliGRAM(s) IV Push every 24 hours  LORazepam     Tablet 1 milliGRAM(s) Oral <User Schedule>  LORazepam     Tablet 1.5 milliGRAM(s) Oral <User Schedule>  losartan 25 milliGRAM(s) Oral daily  pantoprazole    Tablet 40 milliGRAM(s) Oral every 12 hours  polyethylene glycol 3350 17 Gram(s) Oral two times a day  predniSONE   Tablet 20 milliGRAM(s) Oral daily  sertraline 200 milliGRAM(s) Oral daily  traZODone 150 milliGRAM(s) Oral at bedtime    MEDICATIONS  (PRN):  acetaminophen     Tablet .. 650 milliGRAM(s) Oral every 6 hours PRN Temp greater or equal to 38C (100.4F), Mild Pain (1 - 3)  albuterol    90 MICROgram(s) HFA Inhaler 2 Puff(s) Inhalation every 6 hours PRN for bronchospasm  aluminum hydroxide/magnesium hydroxide/simethicone Suspension 30 milliLiter(s) Oral every 4 hours PRN Dyspepsia  dextrose Oral Gel 15 Gram(s) Oral once PRN Blood Glucose LESS THAN 70 milliGRAM(s)/deciliter  guaiFENesin Oral Liquid (Sugar-Free) 200 milliGRAM(s) Oral every 6 hours PRN Cough  melatonin 3 milliGRAM(s) Oral at bedtime PRN Insomnia  ondansetron Injectable 4 milliGRAM(s) IV Push every 8 hours PRN Nausea and/or Vomiting         Vitals log        ICU Vital Signs Last 24 Hrs  T(C): 36.9 (23 Nov 2023 20:56), Max: 36.9 (23 Nov 2023 20:56)  T(F): 98.4 (23 Nov 2023 20:56), Max: 98.4 (23 Nov 2023 20:56)  HR: 70 (24 Nov 2023 00:55) (70 - 80)  BP: 143/76 (23 Nov 2023 20:56) (128/71 - 155/72)  BP(mean): --  ABP: --  ABP(mean): --  RR: 17 (23 Nov 2023 20:56) (17 - 18)  SpO2: 95% (24 Nov 2023 00:55) (91% - 98%)    O2 Parameters below as of 24 Nov 2023 00:55  Patient On (Oxygen Delivery Method): nasal cannula, 4 L                 Input and Output:  I&O's Detail    22 Nov 2023 07:01  -  23 Nov 2023 07:00  --------------------------------------------------------  IN:    Oral Fluid: 240 mL  Total IN: 240 mL    OUT:  Total OUT: 0 mL    Total NET: 240 mL      23 Nov 2023 07:01 - 24 Nov 2023 05:29  --------------------------------------------------------  IN:    Oral Fluid: 820 mL    PRBCs (Packed Red Blood Cells): 400 mL  Total IN: 1220 mL    OUT:    Voided (mL): 600 mL  Total OUT: 600 mL    Total NET: 620 mL          Lab Data                        7.4    10.28 )-----------( 271      ( 23 Nov 2023 06:43 )             26.1     11-23    143  |  104  |  14  ----------------------------<  113<H>  3.8   |  32<H>  |  0.92    Ca    8.7      23 Nov 2023 06:43  Phos  3.3     11-23  Mg     2.1     11-23    TPro  7.1  /  Alb  3.2<L>  /  TBili  0.3  /  DBili  x   /  AST  33  /  ALT  63  /  AlkPhos  84  11-23            Review of Systems	      Objective     Physical Examination    heart s1s2  lung dc BS  head nc      Pertinent Lab findings & Imaging      Dino:  NO   Adequate UO     I&O's Detail    22 Nov 2023 07:01 - 23 Nov 2023 07:00  --------------------------------------------------------  IN:    Oral Fluid: 240 mL  Total IN: 240 mL    OUT:  Total OUT: 0 mL    Total NET: 240 mL      23 Nov 2023 07:01  -  24 Nov 2023 05:29  --------------------------------------------------------  IN:    Oral Fluid: 820 mL    PRBCs (Packed Red Blood Cells): 400 mL  Total IN: 1220 mL    OUT:    Voided (mL): 600 mL  Total OUT: 600 mL    Total NET: 620 mL               Discussed with:     Cultures:	        Radiology

## 2023-11-24 NOTE — PROGRESS NOTE ADULT - SUBJECTIVE AND OBJECTIVE BOX
[facility], Division of Infectious Diseases  CHIVO Canchola Y. Patel, S. Shah, G. [facility]  [phone]    Name: ELTON IVERSON  Age: 68y  Gender: Male  MRN: [id number]    Interval History:  Patient seen and examined at bedside  No acute overnight events. Afebrile  No complaints  Notes reviewed    Antibiotics:  cefTRIAXone   IVPB 1000 milliGRAM(s) IV Intermittent every 24 hours      Medications:  acetaminophen     Tablet .. 650 milliGRAM(s) Oral every 6 hours PRN  albuterol    90 MICROgram(s) HFA Inhaler 2 Puff(s) Inhalation every 6 hours PRN  albuterol/ipratropium for Nebulization 3 milliLiter(s) Nebulizer every 6 hours  aluminum hydroxide/magnesium hydroxide/simethicone Suspension 30 milliLiter(s) Oral every 4 hours PRN  amLODIPine   Tablet 10 milliGRAM(s) Oral daily  ARIPiprazole 20 milliGRAM(s) Oral daily  atorvastatin 20 milliGRAM(s) Oral at bedtime  budesonide 160 MICROgram(s)/formoterol 4.5 MICROgram(s) Inhaler 2 Puff(s) Inhalation two times a day  buPROPion XL (24-Hour) . 300 milliGRAM(s) Oral daily  cefTRIAXone   IVPB 1000 milliGRAM(s) IV Intermittent every 24 hours  dextrose 5%. 1000 milliLiter(s) IV Continuous <Continuous>  dextrose 5%. 1000 milliLiter(s) IV Continuous <Continuous>  dextrose 50% Injectable 25 Gram(s) IV Push once  dextrose 50% Injectable 25 Gram(s) IV Push once  dextrose 50% Injectable 12.5 Gram(s) IV Push once  dextrose Oral Gel 15 Gram(s) Oral once PRN  famotidine    Tablet 20 milliGRAM(s) Oral two times a day  glucagon  Injectable 1 milliGRAM(s) IntraMuscular once  guaiFENesin Oral Liquid (Sugar-Free) 200 milliGRAM(s) Oral every 6 hours PRN  insulin lispro (ADMELOG) corrective regimen sliding scale   SubCutaneous at bedtime  insulin lispro (ADMELOG) corrective regimen sliding scale   SubCutaneous three times a day before meals  iron sucrose Injectable 100 milliGRAM(s) IV Push every 24 hours  LORazepam     Tablet 1.5 milliGRAM(s) Oral <User Schedule>  LORazepam     Tablet 1 milliGRAM(s) Oral <User Schedule>  losartan 25 milliGRAM(s) Oral daily  melatonin 3 milliGRAM(s) Oral at bedtime PRN  ondansetron Injectable 4 milliGRAM(s) IV Push every 8 hours PRN  pantoprazole    Tablet 40 milliGRAM(s) Oral every 12 hours  polyethylene glycol 3350 17 Gram(s) Oral two times a day  predniSONE   Tablet 20 milliGRAM(s) Oral daily  sertraline 200 milliGRAM(s) Oral daily  traZODone 150 milliGRAM(s) Oral at bedtime      Review of Systems:  Review of systems otherwise negative except as previously noted.    Allergies: No Known Drug Allergies  latex (Rash)    For details regarding the patient's past medical history, social history, family history, and other miscellaneous elements, please refer the initial infectious diseases consultation and/or the admitting history and physical examination for this admission.    Objective:  Vitals:   T(C): 37 (11-24-23 @ 05:46), Max: 37 (11-24-23 @ 05:46)  HR: 98 (11-24-23 @ 05:46) (70 - 98)  BP: 125/85 (11-24-23 @ 06:58) (125/85 - 155/72)  RR: 18 (11-24-23 @ 05:46) (17 - 18)  SpO2: 93% (11-24-23 @ 06:58) (91% - 98%)    Physical Examination:  General: no acute distress  HEENT: NC/AT, EOMI,   Cardio: S1, S2 heard, RRR, no murmurs  Resp: decreased b/l breath sounds  Abd: soft, NT, ND  Ext: no edema or cyanosis  Skin: warm, dry, no visible rash      Laboratory Studies:  CBC:                       8.5    12.32 )-----------( 259      ( 24 Nov 2023 07:00 )             29.9     CMP: 11-24    140  |  102  |  12  ----------------------------<  135<H>  3.9   |  34<H>  |  0.83    Ca    8.4<L>      24 Nov 2023 07:00  Phos  3.3     11-23  Mg     2.1     11-23    TPro  7.1  /  Alb  3.2<L>  /  TBili  0.3  /  DBili  x   /  AST  33  /  ALT  63  /  AlkPhos  84  11-23    LIVER FUNCTIONS - ( 23 Nov 2023 06:43 )  Alb: 3.2 g/dL / Pro: 7.1 g/dL / ALK PHOS: 84 U/L / ALT: 63 U/L / AST: 33 U/L / GGT: x           Urinalysis Basic - ( 24 Nov 2023 07:00 )    Color: x / Appearance: x / SG: x / pH: x  Gluc: 135 mg/dL / Ketone: x  / Bili: x / Urobili: x   Blood: x / Protein: x / Nitrite: x   Leuk Esterase: x / RBC: x / WBC x   Sq Epi: x / Non Sq Epi: x / Bacteria: x        Microbiology: reviewed    Culture - Blood (collected 11-20-23 @ 09:20)  Source: .Blood Blood-Peripheral  Preliminary Report (11-23-23 @ 12:01):    No growth at 72 Hours    Culture - Blood (collected 11-20-23 @ 09:15)  Source: .Blood Blood-Peripheral  Preliminary Report (11-23-23 @ 12:01):    No growth at 72 Hours          Radiology: reviewed

## 2023-11-24 NOTE — CHART NOTE - NSCHARTNOTEFT_GEN_A_CORE
Hospitalist Attending Chart Update    Patient is completing antibiotics course, but remains hypoxemic likely due to COPD with chronic hypoxic component. Patient is typically ambulatory and would need POC to remain functional.    Patient needs home oxygen due to hypoxia related to COPD (J44.1). Patient's oxygen saturation at rest on RA is 86%. On 3L NC, oxygen saturation is 94% on RA. Patient needs home stationary and portable oxygen as the patient is mobile in the home.     CM will arrange for home O2.

## 2023-11-24 NOTE — PROGRESS NOTE ADULT - PROBLEM SELECTOR PLAN 3
-h/o iron deficiency anemia w/ multiple iron transfusions with hematology in the past but has not seen for >1 year  -EGD/colonoscopy 2 years ago w/ gastritis, nonmalignant polyps and diverticula per pt; father history colon cancer  -Hgb stable in low 7s currently, but having signs of symptomatic anemia and wishes to have 1un PRBC -- will transfuse 1un PRBC and monitor  -severe iron deficiency; discussed risks/benefits of venofer and pt agreed to venofer -- will complete 3-day course of venofer 100mg IV q24h today  -transfuse for HgB <7 or if patient symptomatic  -on [facility] in Jan 2022 patient had a Hgb of 13.1 and now it is in mid-7s  -f/u FOBT- no active bleed noticed  -type and screen -h/o iron deficiency anemia w/ multiple iron transfusions with hematology in the past but has not seen for >1 year  -EGD/colonoscopy 2 years ago w/ gastritis, nonmalignant polyps and diverticula per pt; father history colon cancer  -Hgb stable in low 7s currently, but having signs of symptomatic anemia and wishes to have 1un PRBC -- s/p 1unit pRBC transfusion now 8.5  -severe iron deficiency; discussed risks/benefits of venofer and pt agreed to venofer -- will complete 3-day course of venofer 100mg IV q24h today  -transfuse for HgB <7 or if patient symptomatic  -on [facility] in Jan 2022 patient had a Hgb of 13.1 and now it is in mid-7s  -f/u FOBT- no active bleed noticed  -type and screen

## 2023-11-24 NOTE — PROGRESS NOTE ADULT - PROBLEM SELECTOR PLAN 2
New onset afib on 11/24 am  - s/p 1x lopressor 5mg  - possibly secondary to multifocal pna   - Cardiology ([facility]) consulted, f/u recs New onset afib on 11/24 am  - s/p 1x lopressor 5mg  - will start on metoprolol 25mg q6hrs for rate control   - CHADSVASC score 3  - will likely start ac  - possibly secondary to multifocal pna   - Cardiology ([facility]) consulted, f/u recs - New onset afib w/ RVR on 11/24 morning  - s/p 1x lopressor 5mg IV  - start metoprolol tartrate 25mg po q6hrs for rate control   - [id number] score is 3  - discussed risks/benefits of A/C  - will start heparin drip first and monitor for signs of bleeding prior to transitioning to DOAC  - possibly secondary to acute illness   - Cardiology ([facility]) consulted, recs appreciated

## 2023-11-24 NOTE — CONSULT NOTE ADULT - CONSULT REASON
Atrial Fibrillation
pna  brandon  copd
Pneumonia
68y A1C with Estimated Average Glucose Result: 6.5 % (11-21-23 @ 06:40)   diabetes mellitus uncontrolled type 2

## 2023-11-25 ENCOUNTER — TRANSCRIPTION ENCOUNTER (OUTPATIENT)
Age: 68
End: 2023-11-25

## 2023-11-25 VITALS
HEART RATE: 60 BPM | OXYGEN SATURATION: 91 % | DIASTOLIC BLOOD PRESSURE: 71 MMHG | TEMPERATURE: 98 F | SYSTOLIC BLOOD PRESSURE: 121 MMHG

## 2023-11-25 LAB
ALBUMIN SERPL ELPH-MCNC: 3.2 G/DL — LOW (ref 3.3–5)
ALBUMIN SERPL ELPH-MCNC: 3.2 G/DL — LOW (ref 3.3–5)
ALP SERPL-CCNC: 82 U/L — SIGNIFICANT CHANGE UP (ref 40–120)
ALP SERPL-CCNC: 82 U/L — SIGNIFICANT CHANGE UP (ref 40–120)
ALT FLD-CCNC: 45 U/L — SIGNIFICANT CHANGE UP (ref 12–78)
ALT FLD-CCNC: 45 U/L — SIGNIFICANT CHANGE UP (ref 12–78)
ANION GAP SERPL CALC-SCNC: 5 MMOL/L — SIGNIFICANT CHANGE UP (ref 5–17)
ANION GAP SERPL CALC-SCNC: 5 MMOL/L — SIGNIFICANT CHANGE UP (ref 5–17)
APPEARANCE UR: CLEAR — SIGNIFICANT CHANGE UP
APPEARANCE UR: CLEAR — SIGNIFICANT CHANGE UP
APTT BLD: 106.9 SEC — HIGH (ref 24.5–35.6)
APTT BLD: 106.9 SEC — HIGH (ref 24.5–35.6)
APTT BLD: 113.5 SEC — HIGH (ref 24.5–35.6)
APTT BLD: 113.5 SEC — HIGH (ref 24.5–35.6)
AST SERPL-CCNC: 22 U/L — SIGNIFICANT CHANGE UP (ref 15–37)
AST SERPL-CCNC: 22 U/L — SIGNIFICANT CHANGE UP (ref 15–37)
BASOPHILS # BLD AUTO: 0.05 K/UL — SIGNIFICANT CHANGE UP (ref 0–0.2)
BASOPHILS # BLD AUTO: 0.05 K/UL — SIGNIFICANT CHANGE UP (ref 0–0.2)
BASOPHILS NFR BLD AUTO: 0.4 % — SIGNIFICANT CHANGE UP (ref 0–2)
BASOPHILS NFR BLD AUTO: 0.4 % — SIGNIFICANT CHANGE UP (ref 0–2)
BILIRUB SERPL-MCNC: 0.3 MG/DL — SIGNIFICANT CHANGE UP (ref 0.2–1.2)
BILIRUB SERPL-MCNC: 0.3 MG/DL — SIGNIFICANT CHANGE UP (ref 0.2–1.2)
BILIRUB UR-MCNC: NEGATIVE — SIGNIFICANT CHANGE UP
BILIRUB UR-MCNC: NEGATIVE — SIGNIFICANT CHANGE UP
BUN SERPL-MCNC: 19 MG/DL — SIGNIFICANT CHANGE UP (ref 7–23)
BUN SERPL-MCNC: 19 MG/DL — SIGNIFICANT CHANGE UP (ref 7–23)
CALCIUM SERPL-MCNC: 9.1 MG/DL — SIGNIFICANT CHANGE UP (ref 8.5–10.1)
CALCIUM SERPL-MCNC: 9.1 MG/DL — SIGNIFICANT CHANGE UP (ref 8.5–10.1)
CHLORIDE SERPL-SCNC: 101 MMOL/L — SIGNIFICANT CHANGE UP (ref 96–108)
CHLORIDE SERPL-SCNC: 101 MMOL/L — SIGNIFICANT CHANGE UP (ref 96–108)
CO2 SERPL-SCNC: 32 MMOL/L — HIGH (ref 22–31)
CO2 SERPL-SCNC: 32 MMOL/L — HIGH (ref 22–31)
COLOR SPEC: YELLOW — SIGNIFICANT CHANGE UP
COLOR SPEC: YELLOW — SIGNIFICANT CHANGE UP
CREAT SERPL-MCNC: 0.95 MG/DL — SIGNIFICANT CHANGE UP (ref 0.5–1.3)
CREAT SERPL-MCNC: 0.95 MG/DL — SIGNIFICANT CHANGE UP (ref 0.5–1.3)
CULTURE RESULTS: SIGNIFICANT CHANGE UP
DIFF PNL FLD: NEGATIVE — SIGNIFICANT CHANGE UP
DIFF PNL FLD: NEGATIVE — SIGNIFICANT CHANGE UP
EGFR: 88 ML/MIN/1.73M2 — SIGNIFICANT CHANGE UP
EGFR: 88 ML/MIN/1.73M2 — SIGNIFICANT CHANGE UP
EOSINOPHIL # BLD AUTO: 0.43 K/UL — SIGNIFICANT CHANGE UP (ref 0–0.5)
EOSINOPHIL # BLD AUTO: 0.43 K/UL — SIGNIFICANT CHANGE UP (ref 0–0.5)
EOSINOPHIL NFR BLD AUTO: 3 % — SIGNIFICANT CHANGE UP (ref 0–6)
EOSINOPHIL NFR BLD AUTO: 3 % — SIGNIFICANT CHANGE UP (ref 0–6)
GLUCOSE SERPL-MCNC: 118 MG/DL — HIGH (ref 70–99)
GLUCOSE SERPL-MCNC: 118 MG/DL — HIGH (ref 70–99)
GLUCOSE UR QL: NEGATIVE MG/DL — SIGNIFICANT CHANGE UP
GLUCOSE UR QL: NEGATIVE MG/DL — SIGNIFICANT CHANGE UP
HCT VFR BLD CALC: 30.9 % — LOW (ref 39–50)
HCT VFR BLD CALC: 30.9 % — LOW (ref 39–50)
HCT VFR BLD CALC: 31.2 % — LOW (ref 39–50)
HCT VFR BLD CALC: 31.2 % — LOW (ref 39–50)
HGB BLD-MCNC: 8.8 G/DL — LOW (ref 13–17)
HGB BLD-MCNC: 8.8 G/DL — LOW (ref 13–17)
HGB BLD-MCNC: 8.9 G/DL — LOW (ref 13–17)
HGB BLD-MCNC: 8.9 G/DL — LOW (ref 13–17)
IMM GRANULOCYTES NFR BLD AUTO: 0.7 % — SIGNIFICANT CHANGE UP (ref 0–0.9)
IMM GRANULOCYTES NFR BLD AUTO: 0.7 % — SIGNIFICANT CHANGE UP (ref 0–0.9)
KETONES UR-MCNC: NEGATIVE MG/DL — SIGNIFICANT CHANGE UP
KETONES UR-MCNC: NEGATIVE MG/DL — SIGNIFICANT CHANGE UP
LEGIONELLA AG UR QL: NEGATIVE — SIGNIFICANT CHANGE UP
LEGIONELLA AG UR QL: NEGATIVE — SIGNIFICANT CHANGE UP
LEUKOCYTE ESTERASE UR-ACNC: NEGATIVE — SIGNIFICANT CHANGE UP
LEUKOCYTE ESTERASE UR-ACNC: NEGATIVE — SIGNIFICANT CHANGE UP
LYMPHOCYTES # BLD AUTO: 1.82 K/UL — SIGNIFICANT CHANGE UP (ref 1–3.3)
LYMPHOCYTES # BLD AUTO: 1.82 K/UL — SIGNIFICANT CHANGE UP (ref 1–3.3)
LYMPHOCYTES # BLD AUTO: 12.9 % — LOW (ref 13–44)
LYMPHOCYTES # BLD AUTO: 12.9 % — LOW (ref 13–44)
MAGNESIUM SERPL-MCNC: 2.2 MG/DL — SIGNIFICANT CHANGE UP (ref 1.6–2.6)
MAGNESIUM SERPL-MCNC: 2.2 MG/DL — SIGNIFICANT CHANGE UP (ref 1.6–2.6)
MCHC RBC-ENTMCNC: 22.2 PG — LOW (ref 27–34)
MCHC RBC-ENTMCNC: 22.2 PG — LOW (ref 27–34)
MCHC RBC-ENTMCNC: 22.6 PG — LOW (ref 27–34)
MCHC RBC-ENTMCNC: 22.6 PG — LOW (ref 27–34)
MCHC RBC-ENTMCNC: 28.2 GM/DL — LOW (ref 32–36)
MCHC RBC-ENTMCNC: 28.2 GM/DL — LOW (ref 32–36)
MCHC RBC-ENTMCNC: 28.8 GM/DL — LOW (ref 32–36)
MCHC RBC-ENTMCNC: 28.8 GM/DL — LOW (ref 32–36)
MCV RBC AUTO: 78.4 FL — LOW (ref 80–100)
MCV RBC AUTO: 78.4 FL — LOW (ref 80–100)
MCV RBC AUTO: 78.6 FL — LOW (ref 80–100)
MCV RBC AUTO: 78.6 FL — LOW (ref 80–100)
MONOCYTES # BLD AUTO: 0.86 K/UL — SIGNIFICANT CHANGE UP (ref 0–0.9)
MONOCYTES # BLD AUTO: 0.86 K/UL — SIGNIFICANT CHANGE UP (ref 0–0.9)
MONOCYTES NFR BLD AUTO: 6.1 % — SIGNIFICANT CHANGE UP (ref 2–14)
MONOCYTES NFR BLD AUTO: 6.1 % — SIGNIFICANT CHANGE UP (ref 2–14)
NEUTROPHILS # BLD AUTO: 10.84 K/UL — HIGH (ref 1.8–7.4)
NEUTROPHILS # BLD AUTO: 10.84 K/UL — HIGH (ref 1.8–7.4)
NEUTROPHILS NFR BLD AUTO: 76.9 % — SIGNIFICANT CHANGE UP (ref 43–77)
NEUTROPHILS NFR BLD AUTO: 76.9 % — SIGNIFICANT CHANGE UP (ref 43–77)
NITRITE UR-MCNC: NEGATIVE — SIGNIFICANT CHANGE UP
NITRITE UR-MCNC: NEGATIVE — SIGNIFICANT CHANGE UP
NRBC # BLD: 0 /100 WBCS — SIGNIFICANT CHANGE UP (ref 0–0)
PH UR: 5.5 — SIGNIFICANT CHANGE UP (ref 5–8)
PH UR: 5.5 — SIGNIFICANT CHANGE UP (ref 5–8)
PHOSPHATE SERPL-MCNC: 3.1 MG/DL — SIGNIFICANT CHANGE UP (ref 2.5–4.5)
PHOSPHATE SERPL-MCNC: 3.1 MG/DL — SIGNIFICANT CHANGE UP (ref 2.5–4.5)
PLATELET # BLD AUTO: 272 K/UL — SIGNIFICANT CHANGE UP (ref 150–400)
PLATELET # BLD AUTO: 272 K/UL — SIGNIFICANT CHANGE UP (ref 150–400)
PLATELET # BLD AUTO: 310 K/UL — SIGNIFICANT CHANGE UP (ref 150–400)
PLATELET # BLD AUTO: 310 K/UL — SIGNIFICANT CHANGE UP (ref 150–400)
POTASSIUM SERPL-MCNC: 4.5 MMOL/L — SIGNIFICANT CHANGE UP (ref 3.5–5.3)
POTASSIUM SERPL-MCNC: 4.5 MMOL/L — SIGNIFICANT CHANGE UP (ref 3.5–5.3)
POTASSIUM SERPL-SCNC: 4.5 MMOL/L — SIGNIFICANT CHANGE UP (ref 3.5–5.3)
POTASSIUM SERPL-SCNC: 4.5 MMOL/L — SIGNIFICANT CHANGE UP (ref 3.5–5.3)
PROT SERPL-MCNC: 7.1 G/DL — SIGNIFICANT CHANGE UP (ref 6–8.3)
PROT SERPL-MCNC: 7.1 G/DL — SIGNIFICANT CHANGE UP (ref 6–8.3)
PROT UR-MCNC: NEGATIVE MG/DL — SIGNIFICANT CHANGE UP
PROT UR-MCNC: NEGATIVE MG/DL — SIGNIFICANT CHANGE UP
RBC # BLD: 3.94 M/UL — LOW (ref 4.2–5.8)
RBC # BLD: 3.94 M/UL — LOW (ref 4.2–5.8)
RBC # BLD: 3.97 M/UL — LOW (ref 4.2–5.8)
RBC # BLD: 3.97 M/UL — LOW (ref 4.2–5.8)
RBC # FLD: 17.6 % — HIGH (ref 10.3–14.5)
RBC # FLD: 17.6 % — HIGH (ref 10.3–14.5)
RBC # FLD: 17.8 % — HIGH (ref 10.3–14.5)
RBC # FLD: 17.8 % — HIGH (ref 10.3–14.5)
SODIUM SERPL-SCNC: 138 MMOL/L — SIGNIFICANT CHANGE UP (ref 135–145)
SODIUM SERPL-SCNC: 138 MMOL/L — SIGNIFICANT CHANGE UP (ref 135–145)
SP GR SPEC: 1.02 — SIGNIFICANT CHANGE UP (ref 1–1.03)
SP GR SPEC: 1.02 — SIGNIFICANT CHANGE UP (ref 1–1.03)
SPECIMEN SOURCE: SIGNIFICANT CHANGE UP
UROBILINOGEN FLD QL: 0.2 MG/DL — SIGNIFICANT CHANGE UP (ref 0.2–1)
UROBILINOGEN FLD QL: 0.2 MG/DL — SIGNIFICANT CHANGE UP (ref 0.2–1)
WBC # BLD: 14.1 K/UL — HIGH (ref 3.8–10.5)
WBC # BLD: 14.1 K/UL — HIGH (ref 3.8–10.5)
WBC # BLD: 14.95 K/UL — HIGH (ref 3.8–10.5)
WBC # BLD: 14.95 K/UL — HIGH (ref 3.8–10.5)
WBC # FLD AUTO: 14.1 K/UL — HIGH (ref 3.8–10.5)
WBC # FLD AUTO: 14.1 K/UL — HIGH (ref 3.8–10.5)
WBC # FLD AUTO: 14.95 K/UL — HIGH (ref 3.8–10.5)
WBC # FLD AUTO: 14.95 K/UL — HIGH (ref 3.8–10.5)

## 2023-11-25 PROCEDURE — 85027 COMPLETE CBC AUTOMATED: CPT

## 2023-11-25 PROCEDURE — P9016: CPT

## 2023-11-25 PROCEDURE — 83605 ASSAY OF LACTIC ACID: CPT

## 2023-11-25 PROCEDURE — 80048 BASIC METABOLIC PNL TOTAL CA: CPT

## 2023-11-25 PROCEDURE — 82962 GLUCOSE BLOOD TEST: CPT

## 2023-11-25 PROCEDURE — 84100 ASSAY OF PHOSPHORUS: CPT

## 2023-11-25 PROCEDURE — 87449 NOS EACH ORGANISM AG IA: CPT

## 2023-11-25 PROCEDURE — 71260 CT THORAX DX C+: CPT | Mod: MA

## 2023-11-25 PROCEDURE — 99232 SBSQ HOSP IP/OBS MODERATE 35: CPT

## 2023-11-25 PROCEDURE — 36415 COLL VENOUS BLD VENIPUNCTURE: CPT

## 2023-11-25 PROCEDURE — 0225U NFCT DS DNA&RNA 21 SARSCOV2: CPT

## 2023-11-25 PROCEDURE — 94640 AIRWAY INHALATION TREATMENT: CPT

## 2023-11-25 PROCEDURE — 83550 IRON BINDING TEST: CPT

## 2023-11-25 PROCEDURE — 87040 BLOOD CULTURE FOR BACTERIA: CPT

## 2023-11-25 PROCEDURE — 86901 BLOOD TYPING SEROLOGIC RH(D): CPT

## 2023-11-25 PROCEDURE — 80053 COMPREHEN METABOLIC PANEL: CPT

## 2023-11-25 PROCEDURE — 84466 ASSAY OF TRANSFERRIN: CPT

## 2023-11-25 PROCEDURE — 86850 RBC ANTIBODY SCREEN: CPT

## 2023-11-25 PROCEDURE — 82140 ASSAY OF AMMONIA: CPT

## 2023-11-25 PROCEDURE — 83735 ASSAY OF MAGNESIUM: CPT

## 2023-11-25 PROCEDURE — 74177 CT ABD & PELVIS W/CONTRAST: CPT | Mod: MA

## 2023-11-25 PROCEDURE — 86140 C-REACTIVE PROTEIN: CPT

## 2023-11-25 PROCEDURE — 85025 COMPLETE CBC W/AUTO DIFF WBC: CPT

## 2023-11-25 PROCEDURE — 93005 ELECTROCARDIOGRAM TRACING: CPT

## 2023-11-25 PROCEDURE — 70450 CT HEAD/BRAIN W/O DYE: CPT | Mod: MA

## 2023-11-25 PROCEDURE — 82728 ASSAY OF FERRITIN: CPT

## 2023-11-25 PROCEDURE — 86900 BLOOD TYPING SEROLOGIC ABO: CPT

## 2023-11-25 PROCEDURE — 80076 HEPATIC FUNCTION PANEL: CPT

## 2023-11-25 PROCEDURE — 86923 COMPATIBILITY TEST ELECTRIC: CPT

## 2023-11-25 PROCEDURE — 99285 EMERGENCY DEPT VISIT HI MDM: CPT

## 2023-11-25 PROCEDURE — 84484 ASSAY OF TROPONIN QUANT: CPT

## 2023-11-25 PROCEDURE — 83036 HEMOGLOBIN GLYCOSYLATED A1C: CPT

## 2023-11-25 PROCEDURE — 71045 X-RAY EXAM CHEST 1 VIEW: CPT

## 2023-11-25 PROCEDURE — 87641 MR-STAPH DNA AMP PROBE: CPT

## 2023-11-25 PROCEDURE — 99239 HOSP IP/OBS DSCHRG MGMT >30: CPT | Mod: GC

## 2023-11-25 PROCEDURE — 81003 URINALYSIS AUTO W/O SCOPE: CPT

## 2023-11-25 PROCEDURE — 94760 N-INVAS EAR/PLS OXIMETRY 1: CPT

## 2023-11-25 PROCEDURE — 87086 URINE CULTURE/COLONY COUNT: CPT

## 2023-11-25 PROCEDURE — 87640 STAPH A DNA AMP PROBE: CPT

## 2023-11-25 PROCEDURE — 82803 BLOOD GASES ANY COMBINATION: CPT

## 2023-11-25 PROCEDURE — 84145 PROCALCITONIN (PCT): CPT

## 2023-11-25 PROCEDURE — 85730 THROMBOPLASTIN TIME PARTIAL: CPT

## 2023-11-25 PROCEDURE — 85610 PROTHROMBIN TIME: CPT

## 2023-11-25 PROCEDURE — 80061 LIPID PANEL: CPT

## 2023-11-25 PROCEDURE — 36430 TRANSFUSION BLD/BLD COMPNT: CPT

## 2023-11-25 PROCEDURE — 83540 ASSAY OF IRON: CPT

## 2023-11-25 RX ORDER — LOSARTAN POTASSIUM 100 MG/1
1 TABLET, FILM COATED ORAL
Qty: 30 | Refills: 0
Start: 2023-11-25 | End: 2023-12-24

## 2023-11-25 RX ORDER — AMLODIPINE BESYLATE 2.5 MG/1
1 TABLET ORAL
Refills: 0 | DISCHARGE

## 2023-11-25 RX ORDER — APIXABAN 2.5 MG/1
5 TABLET, FILM COATED ORAL EVERY 12 HOURS
Refills: 0 | Status: DISCONTINUED | OUTPATIENT
Start: 2023-11-25 | End: 2023-11-25

## 2023-11-25 RX ORDER — FERROUS SULFATE 325(65) MG
1 TABLET ORAL
Qty: 30 | Refills: 0
Start: 2023-11-25 | End: 2023-12-24

## 2023-11-25 RX ORDER — APIXABAN 2.5 MG/1
1 TABLET, FILM COATED ORAL
Qty: 60 | Refills: 0
Start: 2023-11-25 | End: 2023-12-24

## 2023-11-25 RX ORDER — METOPROLOL TARTRATE 50 MG
1 TABLET ORAL
Qty: 30 | Refills: 0
Start: 2023-11-25 | End: 2023-12-24

## 2023-11-25 RX ADMIN — Medication 3 MILLILITER(S): at 07:29

## 2023-11-25 RX ADMIN — HEPARIN SODIUM 1500 UNIT(S)/HR: 5000 INJECTION INTRAVENOUS; SUBCUTANEOUS at 10:23

## 2023-11-25 RX ADMIN — Medication 1 MILLIGRAM(S): at 12:05

## 2023-11-25 RX ADMIN — PANTOPRAZOLE SODIUM 40 MILLIGRAM(S): 20 TABLET, DELAYED RELEASE ORAL at 05:47

## 2023-11-25 RX ADMIN — ARIPIPRAZOLE 20 MILLIGRAM(S): 15 TABLET ORAL at 11:39

## 2023-11-25 RX ADMIN — Medication 1.5 MILLIGRAM(S): at 05:47

## 2023-11-25 RX ADMIN — HEPARIN SODIUM 1800 UNIT(S)/HR: 5000 INJECTION INTRAVENOUS; SUBCUTANEOUS at 02:03

## 2023-11-25 RX ADMIN — HEPARIN SODIUM 1800 UNIT(S)/HR: 5000 INJECTION INTRAVENOUS; SUBCUTANEOUS at 07:25

## 2023-11-25 RX ADMIN — Medication 25 MILLIGRAM(S): at 05:47

## 2023-11-25 RX ADMIN — FAMOTIDINE 20 MILLIGRAM(S): 10 INJECTION INTRAVENOUS at 05:47

## 2023-11-25 RX ADMIN — Medication 25 MILLIGRAM(S): at 17:11

## 2023-11-25 RX ADMIN — SERTRALINE 200 MILLIGRAM(S): 25 TABLET, FILM COATED ORAL at 11:39

## 2023-11-25 RX ADMIN — BUPROPION HYDROCHLORIDE 300 MILLIGRAM(S): 150 TABLET, EXTENDED RELEASE ORAL at 11:40

## 2023-11-25 RX ADMIN — APIXABAN 5 MILLIGRAM(S): 2.5 TABLET, FILM COATED ORAL at 17:11

## 2023-11-25 RX ADMIN — LOSARTAN POTASSIUM 25 MILLIGRAM(S): 100 TABLET, FILM COATED ORAL at 05:47

## 2023-11-25 RX ADMIN — Medication 20 MILLIGRAM(S): at 05:47

## 2023-11-25 RX ADMIN — POLYETHYLENE GLYCOL 3350 17 GRAM(S): 17 POWDER, FOR SOLUTION ORAL at 17:12

## 2023-11-25 RX ADMIN — HEPARIN SODIUM 1800 UNIT(S)/HR: 5000 INJECTION INTRAVENOUS; SUBCUTANEOUS at 07:27

## 2023-11-25 RX ADMIN — Medication 25 MILLIGRAM(S): at 00:16

## 2023-11-25 RX ADMIN — Medication 3 MILLILITER(S): at 14:13

## 2023-11-25 RX ADMIN — PANTOPRAZOLE SODIUM 40 MILLIGRAM(S): 20 TABLET, DELAYED RELEASE ORAL at 17:11

## 2023-11-25 RX ADMIN — FAMOTIDINE 20 MILLIGRAM(S): 10 INJECTION INTRAVENOUS at 17:11

## 2023-11-25 NOTE — DISCHARGE NOTE NURSING/CASE MANAGEMENT/SOCIAL WORK - PATIENT PORTAL LINK FT
You can access the FollowMyHealth Patient Portal offered by [facility] by registering at the following website: [email or web address]. By joining "[employer]’s FollowMyHealth portal, you will also be able to view your health information using other applications (apps) compatible with our system.

## 2023-11-25 NOTE — PROGRESS NOTE ADULT - SUBJECTIVE AND OBJECTIVE BOX
Date/Time Patient Seen:  		  Referring MD:   Data Reviewed	       Patient is a 68y old  Male who presents with a chief complaint of Pneumonia (24 Nov 2023 11:47)      Subjective/HPI     PAST MEDICAL & SURGICAL HISTORY:  Hypertension    High cholesterol    COPD (chronic obstructive pulmonary disease)    Asthma    GERD (gastroesophageal reflux disease)    Chronic rhinosinusitis    PTSD (post-traumatic stress disorder)  September 11th 2001    Osteoarthrosis, localized    Bakers cyst  Bilateral    Sleep apnea  does not use CPAP machine    Prediabetes    Iron deficiency anemia    S/P knee replacement, right    S/P tonsillectomy and adenoidectomy    S/P wrist surgery  ganglion cyst    S/P arthroscopic surgery of left knee    Bakers cyst  Bilateral removal Bakers Cyst          Medication list         MEDICATIONS  (STANDING):  albuterol/ipratropium for Nebulization 3 milliLiter(s) Nebulizer every 6 hours  ARIPiprazole 20 milliGRAM(s) Oral daily  atorvastatin 20 milliGRAM(s) Oral at bedtime  budesonide 160 MICROgram(s)/formoterol 4.5 MICROgram(s) Inhaler 2 Puff(s) Inhalation two times a day  buPROPion XL (24-Hour) . 300 milliGRAM(s) Oral daily  cefTRIAXone   IVPB 1000 milliGRAM(s) IV Intermittent every 24 hours  dextrose 5%. 1000 milliLiter(s) (50 mL/Hr) IV Continuous <Continuous>  dextrose 5%. 1000 milliLiter(s) (100 mL/Hr) IV Continuous <Continuous>  dextrose 50% Injectable 12.5 Gram(s) IV Push once  dextrose 50% Injectable 25 Gram(s) IV Push once  dextrose 50% Injectable 25 Gram(s) IV Push once  famotidine    Tablet 20 milliGRAM(s) Oral two times a day  glucagon  Injectable 1 milliGRAM(s) IntraMuscular once  heparin  Infusion.  Unit(s)/Hr (21 mL/Hr) IV Continuous <Continuous>  insulin lispro (ADMELOG) corrective regimen sliding scale   SubCutaneous three times a day before meals  insulin lispro (ADMELOG) corrective regimen sliding scale   SubCutaneous at bedtime  LORazepam     Tablet 1.5 milliGRAM(s) Oral <User Schedule>  LORazepam     Tablet 1 milliGRAM(s) Oral <User Schedule>  losartan 25 milliGRAM(s) Oral daily  metoprolol tartrate 25 milliGRAM(s) Oral every 6 hours  pantoprazole    Tablet 40 milliGRAM(s) Oral every 12 hours  polyethylene glycol 3350 17 Gram(s) Oral two times a day  predniSONE   Tablet 20 milliGRAM(s) Oral daily  sertraline 200 milliGRAM(s) Oral daily  traZODone 150 milliGRAM(s) Oral at bedtime    MEDICATIONS  (PRN):  acetaminophen     Tablet .. 650 milliGRAM(s) Oral every 6 hours PRN Temp greater or equal to 38C (100.4F), Mild Pain (1 - 3)  albuterol    90 MICROgram(s) HFA Inhaler 2 Puff(s) Inhalation every 6 hours PRN for bronchospasm  aluminum hydroxide/magnesium hydroxide/simethicone Suspension 30 milliLiter(s) Oral every 4 hours PRN Dyspepsia  dextrose Oral Gel 15 Gram(s) Oral once PRN Blood Glucose LESS THAN 70 milliGRAM(s)/deciliter  guaiFENesin Oral Liquid (Sugar-Free) 200 milliGRAM(s) Oral every 6 hours PRN Cough  heparin   Injectable 9500 Unit(s) IV Push every 6 hours PRN For aPTT less than 40  heparin   Injectable 4500 Unit(s) IV Push every 6 hours PRN For aPTT between 40 - 57  melatonin 3 milliGRAM(s) Oral at bedtime PRN Insomnia  ondansetron Injectable 4 milliGRAM(s) IV Push every 8 hours PRN Nausea and/or Vomiting         Vitals log        ICU Vital Signs Last 24 Hrs  T(C): 36.8 (25 Nov 2023 04:41), Max: 37 (24 Nov 2023 05:46)  T(F): 98.2 (25 Nov 2023 04:41), Max: 98.6 (24 Nov 2023 05:46)  HR: 81 (25 Nov 2023 04:41) (81 - 101)  BP: 124/67 (25 Nov 2023 04:41) (98/57 - 143/79)  BP(mean): --  ABP: --  ABP(mean): --  RR: 19 (25 Nov 2023 04:41) (18 - 19)  SpO2: 98% (25 Nov 2023 04:41) (91% - 98%)    O2 Parameters below as of 25 Nov 2023 04:41  Patient On (Oxygen Delivery Method): nasal cannula  O2 Flow (L/min): 4               Input and Output:  I&O's Detail    23 Nov 2023 07:01  -  24 Nov 2023 07:00  --------------------------------------------------------  IN:    IV PiggyBack: 50 mL    Oral Fluid: 820 mL    PRBCs (Packed Red Blood Cells): 400 mL  Total IN: 1270 mL    OUT:    Voided (mL): 600 mL  Total OUT: 600 mL    Total NET: 670 mL          Lab Data                        8.9    14.95 )-----------( 272      ( 25 Nov 2023 01:22 )             30.9     11-24    140  |  102  |  12  ----------------------------<  135<H>  3.9   |  34<H>  |  0.83    Ca    8.4<L>      24 Nov 2023 07:00  Phos  3.3     11-23  Mg     2.1     11-23    TPro  7.1  /  Alb  3.2<L>  /  TBili  0.3  /  DBili  x   /  AST  33  /  ALT  63  /  AlkPhos  84  11-23            Review of Systems	      Objective     Physical Examination    heart s1s2  lung dc BS  head nc      Pertinent Lab findings & Imaging      Dino:  NO   Adequate UO     I&O's Detail    23 Nov 2023 07:01  -  24 Nov 2023 07:00  --------------------------------------------------------  IN:    IV PiggyBack: 50 mL    Oral Fluid: 820 mL    PRBCs (Packed Red Blood Cells): 400 mL  Total IN: 1270 mL    OUT:    Voided (mL): 600 mL  Total OUT: 600 mL    Total NET: 670 mL               Discussed with:     Cultures:	        Radiology

## 2023-11-25 NOTE — DISCHARGE NOTE NURSING/CASE MANAGEMENT/SOCIAL WORK - CASE MANAGER'S NAME
Case Management office  [phone]- Angelica Mcniell [facility] [phone]

## 2023-11-25 NOTE — PROGRESS NOTE ADULT - SUBJECTIVE AND OBJECTIVE BOX
[facility] -- Jaswinder Marcum, Boom Duong Savella, Goodger, Cohen  Office # [phone]    Follow Up: New onset Afib with RVR, HTN, HLD    Subjective/Observations: Patient seen and examined, awake, alert, resting comfortably in bed, denies chest pain, dyspnea, palpitations. on O2 via NC,  hep gtt  infusing     REVIEW OF SYSTEMS: All other review of systems is negative unless indicated above  PAST MEDICAL & SURGICAL HISTORY:  Hypertension      High cholesterol      COPD (chronic obstructive pulmonary disease)      GERD (gastroesophageal reflux disease)      PTSD (post-traumatic stress disorder)  September 11th 2001      Osteoarthrosis, localized      Sleep apnea  does not use CPAP machine      Prediabetes      Iron deficiency anemia      S/P knee replacement, right      S/P tonsillectomy and adenoidectomy      S/P wrist surgery  ganglion cyst      S/P arthroscopic surgery of left knee        MEDICATIONS  (STANDING):  albuterol/ipratropium for Nebulization 3 milliLiter(s) Nebulizer every 6 hours  ARIPiprazole 20 milliGRAM(s) Oral daily  atorvastatin 20 milliGRAM(s) Oral at bedtime  budesonide 160 MICROgram(s)/formoterol 4.5 MICROgram(s) Inhaler 2 Puff(s) Inhalation two times a day  buPROPion XL (24-Hour) . 300 milliGRAM(s) Oral daily  cefTRIAXone   IVPB 1000 milliGRAM(s) IV Intermittent every 24 hours  dextrose 5%. 1000 milliLiter(s) (100 mL/Hr) IV Continuous <Continuous>  dextrose 5%. 1000 milliLiter(s) (50 mL/Hr) IV Continuous <Continuous>  dextrose 50% Injectable 25 Gram(s) IV Push once  dextrose 50% Injectable 25 Gram(s) IV Push once  dextrose 50% Injectable 12.5 Gram(s) IV Push once  famotidine    Tablet 20 milliGRAM(s) Oral two times a day  glucagon  Injectable 1 milliGRAM(s) IntraMuscular once  heparin  Infusion.  Unit(s)/Hr (21 mL/Hr) IV Continuous <Continuous>  insulin lispro (ADMELOG) corrective regimen sliding scale   SubCutaneous at bedtime  insulin lispro (ADMELOG) corrective regimen sliding scale   SubCutaneous three times a day before meals  LORazepam     Tablet 1.5 milliGRAM(s) Oral <User Schedule>  LORazepam     Tablet 1 milliGRAM(s) Oral <User Schedule>  losartan 25 milliGRAM(s) Oral daily  metoprolol tartrate 25 milliGRAM(s) Oral every 6 hours  pantoprazole    Tablet 40 milliGRAM(s) Oral every 12 hours  polyethylene glycol 3350 17 Gram(s) Oral two times a day  predniSONE   Tablet 20 milliGRAM(s) Oral daily  sertraline 200 milliGRAM(s) Oral daily  traZODone 150 milliGRAM(s) Oral at bedtime    MEDICATIONS  (PRN):  acetaminophen     Tablet .. 650 milliGRAM(s) Oral every 6 hours PRN Temp greater or equal to 38C (100.4F), Mild Pain (1 - 3)  albuterol    90 MICROgram(s) HFA Inhaler 2 Puff(s) Inhalation every 6 hours PRN for bronchospasm  aluminum hydroxide/magnesium hydroxide/simethicone Suspension 30 milliLiter(s) Oral every 4 hours PRN Dyspepsia  dextrose Oral Gel 15 Gram(s) Oral once PRN Blood Glucose LESS THAN 70 milliGRAM(s)/deciliter  guaiFENesin Oral Liquid (Sugar-Free) 200 milliGRAM(s) Oral every 6 hours PRN Cough  heparin   Injectable 9500 Unit(s) IV Push every 6 hours PRN For aPTT less than 40  heparin   Injectable 4500 Unit(s) IV Push every 6 hours PRN For aPTT between 40 - 57  melatonin 3 milliGRAM(s) Oral at bedtime PRN Insomnia  ondansetron Injectable 4 milliGRAM(s) IV Push every 8 hours PRN Nausea and/or Vomiting    Allergies    No Known Drug Allergies  latex (Rash)    Intolerances      Vital Signs Last 24 Hrs  T(C): 36.8 (25 Nov 2023 04:41), Max: 36.8 (25 Nov 2023 00:10)  T(F): 98.2 (25 Nov 2023 04:41), Max: 98.3 (25 Nov 2023 00:10)  HR: 94 (25 Nov 2023 07:29) (81 - 101)  BP: 124/67 (25 Nov 2023 04:41) (98/57 - 133/68)  BP(mean): --  RR: 19 (25 Nov 2023 08:07) (18 - 19)  SpO2: 93% (25 Nov 2023 08:07) (93% - 98%)    Parameters below as of 25 Nov 2023 08:07  Patient On (Oxygen Delivery Method): nasal cannula  O2 Flow (L/min): 2    I&O's Summary        TELE: Afib 80's - 100's (up to 140's @1700 11/24), converted back to SR 70's approx 0840 (11/25)   PHYSICAL EXAM:  Constitutional: NAD, awake and alert, obese  HEENT: Moist Mucous Membranes, Anicteric  Pulmonary: Non-labored, breath sounds are dim bilaterally, No wheezing, rales or rhonchi  Cardiovascular: IRRR, S1 and S2, No murmurs, rubs, gallops or clicks  Gastrointestinal: Bowel Sounds present, soft, nontender.   Lymph: No peripheral edema. No lymphadenopathy.  Skin: No visible rashes or ulcers.  Psych:  Mood & affect appropriate  LABS: All Labs Reviewed:                        8.8    14.10 )-----------( 310      ( 25 Nov 2023 08:03 )             31.2                         8.9    14.95 )-----------( 272      ( 25 Nov 2023 01:22 )             30.9                         8.5    12.32 )-----------( 259      ( 24 Nov 2023 07:00 )             29.9     25 Nov 2023 08:03    138    |  101    |  19     ----------------------------<  118    4.5     |  32     |  0.95   24 Nov 2023 07:00    140    |  102    |  12     ----------------------------<  135    3.9     |  34     |  0.83   23 Nov 2023 06:43    143    |  104    |  14     ----------------------------<  113    3.8     |  32     |  0.92     Ca    9.1        25 Nov 2023 08:03  Ca    8.4        24 Nov 2023 07:00  Ca    8.7        23 Nov 2023 06:43  Phos  3.1       25 Nov 2023 08:03  Phos  3.3       23 Nov 2023 06:43  Mg     2.2       25 Nov 2023 08:03  Mg     2.1       23 Nov 2023 06:43    TPro  7.1    /  Alb  3.2    /  TBili  0.3    /  DBili  x      /  AST  22     /  ALT  45     /  AlkPhos  82     25 Nov 2023 08:03  TPro  7.1    /  Alb  3.2    /  TBili  0.3    /  DBili  x      /  AST  33     /  ALT  63     /  AlkPhos  84     23 Nov 2023 06:43    PTT - ( 25 Nov 2023 08:03 )  PTT:113.5 sec      12 Lead ECG:   Ventricular Rate 129 BPM    QRS Duration 90 ms    Q-T Interval 318 ms    QTC Calculation(Bazett) 465 ms    R Axis 26 degrees    T Axis -33 degrees    Diagnosis Line Atrial fibrillation with rapid ventricular response  Nonspecific ST abnormality  Abnormal ECG  When compared with ECG of 20-NOV-2023 10:15,  Atrial fibrillation has replaced Sinus rhythm  Nonspecific T wave abnormality now evident in Inferior leads  Confirmed by ERIC RIVERA ([id number]) on 11/24/2023 9:35:16 PM (11-24-23 @ 08:07)        Health Care Proxy (HCP)

## 2023-11-25 NOTE — PROGRESS NOTE ADULT - PROBLEM SELECTOR PLAN 2
- New onset afib w/ RVR on 11/24 morning  - s/p 1x lopressor 5mg IV  - c/w metoprolol tartrate 25mg po q6hrs for rate control   - [id number] score is 3  - discussed risks/benefits of A/C  - c/w heparin drip first and monitor for signs of bleeding prior to transitioning to DOAC  - possibly secondary to acute illness   - Cardiology (Boom) consulted, recs appreciated - New onset afib w/ RVR on 11/24 morning, now sinus  - s/p 1x lopressor 5mg IV  - c/w metoprolol tartrate 25mg po q6hrs for rate control   - [id number] score is 3  - discussed risks/benefits of A/C  - c/w heparin drip first and monitor for signs of bleeding prior to transitioning to DOAC  - possibly secondary to acute illness   - Cardiology (Boom) consulted, recs appreciated

## 2023-11-25 NOTE — PROGRESS NOTE ADULT - ASSESSMENT
69 yo male w/ pmh COPD, HTN, HLD, Hypertriglyceridemia, BRIAN, Prediabetes, Anxiety/Depression, PTSD, GERD, BRANDON presents with weakness    copd  pna  brandon  HTN  HLD  DM  Anxiety  Depression  GERD  PTSD    cardio eval noted  vs noted  may need home o2  on ABX  on prednisone low dose - bronchodilators -   cm follow up    low dose steroids and nebs for copd  VBG noted  ct imaging reviewed - eval for PNA  emp ABX  biomarkers - cx -   cough rx regimen  DM care  serial FS  BRANDON - does not use CPAP - sleep hygiene reviewed  PPI for GERD  anxiolysis  emotional support  monitor VS and Sat

## 2023-11-25 NOTE — PROGRESS NOTE ADULT - ASSESSMENT
69yo M w/ PMH of COPD (not on O2), HTN, HLD, Hypertriglyceridemia, BRIAN (hx of needing iron infusions), dabetes, Anxiety/Depression, PTSD, GERD, BOOKER admitted for multifocal pneumonia and anemia c/b Afib with RVR.    New onset Afib with RVR, HTN, HLD  - HR up to 140 bpm, reported CARTER, likely in the setting of PNA and COPD exac  - HR remains controlled overnight with conversion back to SR this morning @ approx 0840, remains SR 70-80's     - BP stable and controlled  - continue losartan   - continue to monitor routine hemodynamics    - EKG Atrial fibrillation with RVR, 129 bpm, obtain repeat EKG   - s/p metoprolol 5mg IVP, rates improved to  bpm  - continue metoprolol 25mg q6hrs for rate control   - CHADSVASC score 3  - on heparin gtt for full AC, can switch to DOAC    - Euvolemic on exam, on O2 via NC  - f/u TTE     - + PNA on abx per primary   - will benefit from o/p sleep eval   - Monitor and replete Lytes. Keep K > 4 and Mg > 2  - Will continue to follow.    Nancy Keenan [facility]  Nurse Practitioner - Cardiology   call TEAMS 69yo M w/ PMH of COPD (not on O2), HTN, HLD, Hypertriglyceridemia, BRIAN (hx of needing iron infusions), dabetes, Anxiety/Depression, PTSD, GERD, BOOKER admitted for multifocal pneumonia and anemia c/b Afib with RVR.    New onset Afib with RVR, HTN, HLD  - HR up to 140 bpm, reported CARTER, likely in the setting of PNA and COPD exac  - HR was controlled overnight with conversion back to SR this morning @ approx 0840, remains SR 70-80's     - BP stable and controlled  - continue losartan   - continue to monitor routine hemodynamics    - EKG Atrial fibrillation with RVR, 129 bpm, obtain repeat EKG   - s/p metoprolol 5mg IVP, rates improved to  bpm  - continue metoprolol 25mg q6hrs for rate control   - CHADSVASC score 3  - on heparin gtt for full AC, can switch to DOAC    - Euvolemic on exam, on O2 via NC  - f/u TTE     - + PNA on abx per primary   - will benefit from o/p sleep eval   - Monitor and replete Lytes. Keep K > 4 and Mg > 2  - Will continue to follow.    Nancy Keenan [facility]  Nurse Practitioner - Cardiology   call TEAMS

## 2023-11-25 NOTE — PATIENT CHOICE NOTE. - NSPTCHOICESTATE_GEN_ALL_CORE
I have met with the patient and/or caregiver to discuss discharge goals and treatment plan. Patient and/or caregiver also provided with instructions on accessing the CMS Compare websites for additional information related to Post Acute Provider quality and resource use measures to assist them in evaluation of the providers and in selecting their post-acute provider of choice. Patient and caregiver were informed of the facilities that are owned and/or operated by [facility]. I have discussed with the patient the availability of in-network facilities and providers. Patient and caregiver provided with a list of post-acute providers whose services are appropriate to the discharge plans and patient needs.     For patient requiring durable medical equipment, patient and/or caregiver were informed that they have the right to request who provides the required equipment.
I have met with the patient and/or caregiver to discuss discharge goals and treatment plan. Patient and/or caregiver also provided with instructions on accessing the CMS Compare websites for additional information related to Post Acute Provider quality and resource use measures to assist them in evaluation of the providers and in selecting their post-acute provider of choice. Patient and caregiver were informed of the facilities that are owned and/or operated by [facility]. I have discussed with the patient the availability of in-network facilities and providers. Patient and caregiver provided with a list of post-acute providers whose services are appropriate to the discharge plans and patient needs.     For patient requiring durable medical equipment, patient and/or caregiver were informed that they have the right to request who provides the required equipment.

## 2023-11-25 NOTE — PROGRESS NOTE ADULT - PROBLEM SELECTOR PROBLEM 5
COPD with exacerbation
Post traumatic stress disorder (PTSD)
COPD with exacerbation
Post traumatic stress disorder (PTSD)

## 2023-11-25 NOTE — PROGRESS NOTE ADULT - ASSESSMENT
69yo M w/ PMH of COPD (not on O2), HTN, HLD, Hypertriglyceridemia, BRIAN (hx of needing iron infusions), Prediabetes, Anxiety/Depression, PTSD, GERD, BOOKER admitted for multifocal pneumonia and anemia, course c/b afib w/ RVR.

## 2023-11-25 NOTE — PROGRESS NOTE ADULT - REASON FOR ADMISSION
Pneumonia

## 2023-11-25 NOTE — PROGRESS NOTE ADULT - PROBLEM SELECTOR PROBLEM 7
HTN (hypertension)
HTN (hypertension)
HLD (hyperlipidemia)
HLD (hyperlipidemia)
HTN (hypertension)
HTN (hypertension)

## 2023-11-25 NOTE — PROGRESS NOTE ADULT - PROBLEM SELECTOR PLAN 1
-h/o bronchitis 1 month ago treated with two rounds of abx and steroids  -CT Chest: Findings concerning for multifocal pneumonia, most prominent in the right upper lobe. Slightly enlarged mediastinal and right hilar lymph nodes, nonspecific and possibly reactive.  -WBC 14.44, Neutrophils 83.5% on admission -- leukocytosis now 12.32, though pt has been on prednisone this admission, making it difficult to gauge the significance of the WBC elevation  -Continue Rocephin; completed 3-day course of Azithromycin  -taper off NC  -Monitor VS, trend WBC  -pulm, Dr Funes consulted, recs appreciated  -ID, Dr Mart consulted recs appreciated -h/o bronchitis 1 month ago treated with two rounds of abx and steroids  -CT Chest: Findings concerning for multifocal pneumonia, most prominent in the right upper lobe. Slightly enlarged mediastinal and right hilar lymph nodes, nonspecific and possibly reactive.  -WBC 14.44, Neutrophils 83.5% on admission -- leukocytosis now 12.32, though pt has been on prednisone this admission, making it difficult to gauge the significance of the WBC elevation  -completed Rocephin for 5 day course ; completed 3-day course of Azithromycin  -taper off NC  -Monitor VS, trend WBC  -pulm, Dr Funes consulted, recs appreciated  -ID, Dr Mart consulted recs appreciated

## 2023-11-25 NOTE — DISCHARGE NOTE NURSING/CASE MANAGEMENT/SOCIAL WORK - NSSCCONTNUM_GEN_ALL_CORE
[facility] agency [phone] or [phone] will reach out to you within 24-72 hours of your discharge to schedule home care visit/eval appointment with you. Please call agency for any queries regarding home care services

## 2023-11-25 NOTE — PROGRESS NOTE ADULT - SUBJECTIVE AND OBJECTIVE BOX
Patient is a 68y old  Male who presents with a chief complaint of Pneumonia (25 Nov 2023 05:29)      INTERVAL HPI/OVERNIGHT EVENTS: Patient seen and examined at bedside. No overnight events occurred. Patient has no complaints at this time. Denies fevers, chills, headache, lightheadedness, chest pain, dyspnea, abdominal pain, n/v/d/c.    MEDICATIONS  (STANDING):  albuterol/ipratropium for Nebulization 3 milliLiter(s) Nebulizer every 6 hours  ARIPiprazole 20 milliGRAM(s) Oral daily  atorvastatin 20 milliGRAM(s) Oral at bedtime  budesonide 160 MICROgram(s)/formoterol 4.5 MICROgram(s) Inhaler 2 Puff(s) Inhalation two times a day  buPROPion XL (24-Hour) . 300 milliGRAM(s) Oral daily  cefTRIAXone   IVPB 1000 milliGRAM(s) IV Intermittent every 24 hours  dextrose 5%. 1000 milliLiter(s) (100 mL/Hr) IV Continuous <Continuous>  dextrose 5%. 1000 milliLiter(s) (50 mL/Hr) IV Continuous <Continuous>  dextrose 50% Injectable 25 Gram(s) IV Push once  dextrose 50% Injectable 25 Gram(s) IV Push once  dextrose 50% Injectable 12.5 Gram(s) IV Push once  famotidine    Tablet 20 milliGRAM(s) Oral two times a day  glucagon  Injectable 1 milliGRAM(s) IntraMuscular once  heparin  Infusion.  Unit(s)/Hr (21 mL/Hr) IV Continuous <Continuous>  insulin lispro (ADMELOG) corrective regimen sliding scale   SubCutaneous at bedtime  insulin lispro (ADMELOG) corrective regimen sliding scale   SubCutaneous three times a day before meals  LORazepam     Tablet 1.5 milliGRAM(s) Oral <User Schedule>  LORazepam     Tablet 1 milliGRAM(s) Oral <User Schedule>  losartan 25 milliGRAM(s) Oral daily  metoprolol tartrate 25 milliGRAM(s) Oral every 6 hours  pantoprazole    Tablet 40 milliGRAM(s) Oral every 12 hours  polyethylene glycol 3350 17 Gram(s) Oral two times a day  predniSONE   Tablet 20 milliGRAM(s) Oral daily  sertraline 200 milliGRAM(s) Oral daily  traZODone 150 milliGRAM(s) Oral at bedtime    MEDICATIONS  (PRN):  acetaminophen     Tablet .. 650 milliGRAM(s) Oral every 6 hours PRN Temp greater or equal to 38C (100.4F), Mild Pain (1 - 3)  albuterol    90 MICROgram(s) HFA Inhaler 2 Puff(s) Inhalation every 6 hours PRN for bronchospasm  aluminum hydroxide/magnesium hydroxide/simethicone Suspension 30 milliLiter(s) Oral every 4 hours PRN Dyspepsia  dextrose Oral Gel 15 Gram(s) Oral once PRN Blood Glucose LESS THAN 70 milliGRAM(s)/deciliter  guaiFENesin Oral Liquid (Sugar-Free) 200 milliGRAM(s) Oral every 6 hours PRN Cough  heparin   Injectable 9500 Unit(s) IV Push every 6 hours PRN For aPTT less than 40  heparin   Injectable 4500 Unit(s) IV Push every 6 hours PRN For aPTT between 40 - 57  melatonin 3 milliGRAM(s) Oral at bedtime PRN Insomnia  ondansetron Injectable 4 milliGRAM(s) IV Push every 8 hours PRN Nausea and/or Vomiting      Allergies    No Known Drug Allergies  latex (Rash)    Intolerances        REVIEW OF SYSTEMS:  CONSTITUTIONAL: No fever or chills  HEENT:  No headache, no sore throat  RESPIRATORY: No cough, wheezing, or shortness of breath  CARDIOVASCULAR: No chest pain, palpitations  GASTROINTESTINAL: No abd pain, nausea, vomiting, or diarrhea  GENITOURINARY: No dysuria, frequency, or hematuria  NEUROLOGICAL: no focal weakness or dizziness  MUSCULOSKELETAL: no myalgias     Vital Signs Last 24 Hrs  T(C): 36.8 (25 Nov 2023 04:41), Max: 36.8 (25 Nov 2023 00:10)  T(F): 98.2 (25 Nov 2023 04:41), Max: 98.3 (25 Nov 2023 00:10)  HR: 81 (25 Nov 2023 04:41) (81 - 101)  BP: 124/67 (25 Nov 2023 04:41) (98/57 - 133/68)  BP(mean): --  RR: 19 (25 Nov 2023 08:07) (18 - 19)  SpO2: 93% (25 Nov 2023 08:07) (93% - 98%)    Parameters below as of 25 Nov 2023 08:07  Patient On (Oxygen Delivery Method): nasal cannula  O2 Flow (L/min): 2      PHYSICAL EXAM:  GENERAL: NAD  HEENT:  anicteric, moist mucous membranes  CHEST/LUNG:  CTA b/l, no rales, wheezes, or rhonchi  HEART:  RRR, S1, S2  ABDOMEN:  BS+, soft, nontender, nondistended  EXTREMITIES: no edema, cyanosis, or calf tenderness  NERVOUS SYSTEM: answers questions and follows commands appropriately    LABS:                        8.9    14.95 )-----------( 272      ( 25 Nov 2023 01:22 )             30.9     CBC Full  -  ( 25 Nov 2023 01:22 )  WBC Count : 14.95 K/uL  Hemoglobin : 8.9 g/dL  Hematocrit : 30.9 %  Platelet Count - Automated : 272 K/uL  Mean Cell Volume : 78.4 fl  Mean Cell Hemoglobin : 22.6 pg  Mean Cell Hemoglobin Concentration : 28.8 gm/dL  Auto Neutrophil # : x  Auto Lymphocyte # : x  Auto Monocyte # : x  Auto Eosinophil # : x  Auto Basophil # : x  Auto Neutrophil % : x  Auto Lymphocyte % : x  Auto Monocyte % : x  Auto Eosinophil % : x  Auto Basophil % : x      Ca    8.4        24 Nov 2023 07:00      PTT - ( 25 Nov 2023 01:22 )  PTT:106.9 sec  Urinalysis Basic - ( 24 Nov 2023 07:00 )    Color: x / Appearance: x / SG: x / pH: x  Gluc: 135 mg/dL / Ketone: x  / Bili: x / Urobili: x   Blood: x / Protein: x / Nitrite: x   Leuk Esterase: x / RBC: x / WBC x   Sq Epi: x / Non Sq Epi: x / Bacteria: x      CAPILLARY BLOOD GLUCOSE      POCT Blood Glucose.: 129 mg/dL (25 Nov 2023 08:12)  POCT Blood Glucose.: 130 mg/dL (24 Nov 2023 21:34)  POCT Blood Glucose.: 106 mg/dL (24 Nov 2023 18:08)  POCT Blood Glucose.: 214 mg/dL (24 Nov 2023 12:00)        Culture - Blood (collected 11-20-23 @ 09:20)  Source: .Blood Blood-Peripheral  Preliminary Report (11-24-23 @ 12:00):    No growth at 4 days    Culture - Blood (collected 11-20-23 @ 09:15)  Source: .Blood Blood-Peripheral  Preliminary Report (11-24-23 @ 12:00):    No growth at 4 days        RADIOLOGY & ADDITIONAL TESTS:    Personally reviewed.     Consultant(s) Notes Reviewed:  [x] YES  [ ] NO     Patient is a 68y old  Male who presents with a chief complaint of Pneumonia (25 Nov 2023 05:29)      INTERVAL HPI/OVERNIGHT EVENTS: Patient seen and examined at bedside. No overnight events occurred. Patient has no complaints at this time. Patient converted to sinus rhythm around 8ish am. Denies fevers, chills, headache, lightheadedness, chest pain, dyspnea, abdominal pain, n/v/d/c.    MEDICATIONS  (STANDING):  albuterol/ipratropium for Nebulization 3 milliLiter(s) Nebulizer every 6 hours  ARIPiprazole 20 milliGRAM(s) Oral daily  atorvastatin 20 milliGRAM(s) Oral at bedtime  budesonide 160 MICROgram(s)/formoterol 4.5 MICROgram(s) Inhaler 2 Puff(s) Inhalation two times a day  buPROPion XL (24-Hour) . 300 milliGRAM(s) Oral daily  cefTRIAXone   IVPB 1000 milliGRAM(s) IV Intermittent every 24 hours  dextrose 5%. 1000 milliLiter(s) (100 mL/Hr) IV Continuous <Continuous>  dextrose 5%. 1000 milliLiter(s) (50 mL/Hr) IV Continuous <Continuous>  dextrose 50% Injectable 25 Gram(s) IV Push once  dextrose 50% Injectable 25 Gram(s) IV Push once  dextrose 50% Injectable 12.5 Gram(s) IV Push once  famotidine    Tablet 20 milliGRAM(s) Oral two times a day  glucagon  Injectable 1 milliGRAM(s) IntraMuscular once  heparin  Infusion.  Unit(s)/Hr (21 mL/Hr) IV Continuous <Continuous>  insulin lispro (ADMELOG) corrective regimen sliding scale   SubCutaneous at bedtime  insulin lispro (ADMELOG) corrective regimen sliding scale   SubCutaneous three times a day before meals  LORazepam     Tablet 1.5 milliGRAM(s) Oral <User Schedule>  LORazepam     Tablet 1 milliGRAM(s) Oral <User Schedule>  losartan 25 milliGRAM(s) Oral daily  metoprolol tartrate 25 milliGRAM(s) Oral every 6 hours  pantoprazole    Tablet 40 milliGRAM(s) Oral every 12 hours  polyethylene glycol 3350 17 Gram(s) Oral two times a day  predniSONE   Tablet 20 milliGRAM(s) Oral daily  sertraline 200 milliGRAM(s) Oral daily  traZODone 150 milliGRAM(s) Oral at bedtime    MEDICATIONS  (PRN):  acetaminophen     Tablet .. 650 milliGRAM(s) Oral every 6 hours PRN Temp greater or equal to 38C (100.4F), Mild Pain (1 - 3)  albuterol    90 MICROgram(s) HFA Inhaler 2 Puff(s) Inhalation every 6 hours PRN for bronchospasm  aluminum hydroxide/magnesium hydroxide/simethicone Suspension 30 milliLiter(s) Oral every 4 hours PRN Dyspepsia  dextrose Oral Gel 15 Gram(s) Oral once PRN Blood Glucose LESS THAN 70 milliGRAM(s)/deciliter  guaiFENesin Oral Liquid (Sugar-Free) 200 milliGRAM(s) Oral every 6 hours PRN Cough  heparin   Injectable 9500 Unit(s) IV Push every 6 hours PRN For aPTT less than 40  heparin   Injectable 4500 Unit(s) IV Push every 6 hours PRN For aPTT between 40 - 57  melatonin 3 milliGRAM(s) Oral at bedtime PRN Insomnia  ondansetron Injectable 4 milliGRAM(s) IV Push every 8 hours PRN Nausea and/or Vomiting      Allergies    No Known Drug Allergies  latex (Rash)    Intolerances        REVIEW OF SYSTEMS:  CONSTITUTIONAL: No fever or chills  HEENT:  No headache, no sore throat  RESPIRATORY: No cough, wheezing, or shortness of breath  CARDIOVASCULAR: No chest pain, palpitations  GASTROINTESTINAL: No abd pain, nausea, vomiting, or diarrhea  GENITOURINARY: No dysuria, frequency, or hematuria  NEUROLOGICAL: no focal weakness or dizziness  MUSCULOSKELETAL: no myalgias     Vital Signs Last 24 Hrs  T(C): 36.8 (25 Nov 2023 04:41), Max: 36.8 (25 Nov 2023 00:10)  T(F): 98.2 (25 Nov 2023 04:41), Max: 98.3 (25 Nov 2023 00:10)  HR: 81 (25 Nov 2023 04:41) (81 - 101)  BP: 124/67 (25 Nov 2023 04:41) (98/57 - 133/68)  BP(mean): --  RR: 19 (25 Nov 2023 08:07) (18 - 19)  SpO2: 93% (25 Nov 2023 08:07) (93% - 98%)    Parameters below as of 25 Nov 2023 08:07  Patient On (Oxygen Delivery Method): nasal cannula  O2 Flow (L/min): 2      PHYSICAL EXAM:  GENERAL: NAD  HEENT:  anicteric, moist mucous membranes  CHEST/LUNG:  CTA b/l, no rales, wheezes, or rhonchi  HEART:  RRR, S1, S2  ABDOMEN:  BS+, soft, nontender, nondistended  EXTREMITIES: no edema, cyanosis, or calf tenderness  NERVOUS SYSTEM: answers questions and follows commands appropriately    LABS:                        8.9    14.95 )-----------( 272      ( 25 Nov 2023 01:22 )             30.9     CBC Full  -  ( 25 Nov 2023 01:22 )  WBC Count : 14.95 K/uL  Hemoglobin : 8.9 g/dL  Hematocrit : 30.9 %  Platelet Count - Automated : 272 K/uL  Mean Cell Volume : 78.4 fl  Mean Cell Hemoglobin : 22.6 pg  Mean Cell Hemoglobin Concentration : 28.8 gm/dL  Auto Neutrophil # : x  Auto Lymphocyte # : x  Auto Monocyte # : x  Auto Eosinophil # : x  Auto Basophil # : x  Auto Neutrophil % : x  Auto Lymphocyte % : x  Auto Monocyte % : x  Auto Eosinophil % : x  Auto Basophil % : x      Ca    8.4        24 Nov 2023 07:00      PTT - ( 25 Nov 2023 01:22 )  PTT:106.9 sec  Urinalysis Basic - ( 24 Nov 2023 07:00 )    Color: x / Appearance: x / SG: x / pH: x  Gluc: 135 mg/dL / Ketone: x  / Bili: x / Urobili: x   Blood: x / Protein: x / Nitrite: x   Leuk Esterase: x / RBC: x / WBC x   Sq Epi: x / Non Sq Epi: x / Bacteria: x      CAPILLARY BLOOD GLUCOSE      POCT Blood Glucose.: 129 mg/dL (25 Nov 2023 08:12)  POCT Blood Glucose.: 130 mg/dL (24 Nov 2023 21:34)  POCT Blood Glucose.: 106 mg/dL (24 Nov 2023 18:08)  POCT Blood Glucose.: 214 mg/dL (24 Nov 2023 12:00)        Culture - Blood (collected 11-20-23 @ 09:20)  Source: .Blood Blood-Peripheral  Preliminary Report (11-24-23 @ 12:00):    No growth at 4 days    Culture - Blood (collected 11-20-23 @ 09:15)  Source: .Blood Blood-Peripheral  Preliminary Report (11-24-23 @ 12:00):    No growth at 4 days        RADIOLOGY & ADDITIONAL TESTS:    Personally reviewed.     Consultant(s) Notes Reviewed:  [x] YES  [ ] NO

## 2023-11-25 NOTE — PROGRESS NOTE ADULT - PROBLEM SELECTOR PLAN 3
-h/o iron deficiency anemia w/ multiple iron transfusions with hematology in the past but has not seen for >1 year  -EGD/colonoscopy 2 years ago w/ gastritis, nonmalignant polyps and diverticula per pt; father history colon cancer  -Hgb stable in low 7s currently, but having signs of symptomatic anemia and wishes to have 1un PRBC -- s/p 1unit pRBC transfusion now 8.5  -severe iron deficiency; discussed risks/benefits of venofer and pt agreed to venofer -- will complete 3-day course of venofer 100mg IV q24h today  -transfuse for HgB <7 or if patient symptomatic  -on [facility] in Jan 2022 patient had a Hgb of 13.1 and now it is in mid-7s  -f/u FOBT- no active bleed noticed  -type and screen -h/o iron deficiency anemia w/ multiple iron transfusions with hematology in the past but has not seen for >1 year  -EGD/colonoscopy 2 years ago w/ gastritis, nonmalignant polyps and diverticula per pt; father history colon cancer  -Hgb stable in low 7s currently, but having signs of symptomatic anemia and wishes to have 1un PRBC -- s/p 1unit pRBC transfusion now 8.5  -severe iron deficiency; discussed risks/benefits of venofer and pt agreed to venofer -- finished venofer   -transfuse for HgB <7 or if patient symptomatic  -on [facility] in Jan 2022 patient had a Hgb of 13.1 and now it is in mid-7s  -f/u FOBT- no active bleed noticed  -type and screen

## 2023-11-25 NOTE — PROGRESS NOTE ADULT - PROBLEM SELECTOR PROBLEM 8
HLD (hyperlipidemia)
HLD (hyperlipidemia)
Prediabetes
Prediabetes
Type 2 diabetes mellitus
Type 2 diabetes mellitus

## 2023-11-25 NOTE — PROGRESS NOTE ADULT - PROBLEM SELECTOR PROBLEM 4
COPD with exacerbation
Transaminitis
COPD with exacerbation
COPD with exacerbation
Transaminitis
COPD with exacerbation

## 2023-11-25 NOTE — PROGRESS NOTE ADULT - NS ATTEND AMEND GEN_ALL_CORE FT
69yo M w/ PMH of COPD (not on O2), HTN, HLD, Hypertriglyceridemia, BRIAN (hx of needing iron infusions), dabetes, Anxiety/Depression, PTSD, GERD, BOOKER admitted for multifocal pneumonia and anemia c/b Afib with RVR.    rapid af in the setting of pna  back to sr this am  doac  cont bb, if planning for dc would change to 100 daily xl  echo

## 2023-11-25 NOTE — PROGRESS NOTE ADULT - PROBLEM SELECTOR PLAN 11
VTE Prophylaxis: SCD's due to pending FOBT
VTE Prophylaxis: SCD's due to pending FOBT
VTE Prophylaxis: on heparin gtt
VTE Prophylaxis: SCD's due to pending FOBT

## 2023-11-25 NOTE — DISCHARGE NOTE NURSING/CASE MANAGEMENT/SOCIAL WORK - NSSCTYPOFSERV_GEN_ALL_CORE
RN to visit the day after the hospital discharge.  Please contact the home care agency  at the above phone number if you have not heard from them by 12 noon on the day after your hospital discharge.

## 2023-11-25 NOTE — PROGRESS NOTE ADULT - PROBLEM SELECTOR PROBLEM 6
Diabetes mellitus
Post traumatic stress disorder (PTSD)
HTN (hypertension)
Post traumatic stress disorder (PTSD)
Diabetes mellitus
HTN (hypertension)

## 2023-11-26 LAB
CULTURE RESULTS: SIGNIFICANT CHANGE UP
CULTURE RESULTS: SIGNIFICANT CHANGE UP
SPECIMEN SOURCE: SIGNIFICANT CHANGE UP
SPECIMEN SOURCE: SIGNIFICANT CHANGE UP

## 2023-12-13 PROBLEM — D50.9 IRON DEFICIENCY ANEMIA, UNSPECIFIED: Chronic | Status: ACTIVE | Noted: 2023-11-20

## 2023-12-13 PROBLEM — R73.03 PREDIABETES: Chronic | Status: ACTIVE | Noted: 2023-11-20

## 2023-12-26 ENCOUNTER — APPOINTMENT (OUTPATIENT)
Dept: CARDIOLOGY | Facility: CLINIC | Age: 68
End: 2023-12-26
Payer: MEDICARE

## 2023-12-26 ENCOUNTER — NON-APPOINTMENT (OUTPATIENT)
Age: 68
End: 2023-12-26

## 2023-12-26 VITALS
WEIGHT: 260 LBS | DIASTOLIC BLOOD PRESSURE: 91 MMHG | HEIGHT: 70 IN | OXYGEN SATURATION: 95 % | SYSTOLIC BLOOD PRESSURE: 178 MMHG | BODY MASS INDEX: 37.22 KG/M2 | HEART RATE: 60 BPM

## 2023-12-26 PROCEDURE — 93000 ELECTROCARDIOGRAM COMPLETE: CPT

## 2023-12-26 PROCEDURE — 99214 OFFICE O/P EST MOD 30 MIN: CPT | Mod: 25

## 2023-12-26 RX ORDER — ATORVASTATIN CALCIUM 20 MG/1
20 TABLET, FILM COATED ORAL DAILY
Qty: 90 | Refills: 3 | Status: ACTIVE | COMMUNITY
Start: 1900-01-01 | End: 1900-01-01

## 2023-12-26 RX ORDER — AMLODIPINE BESYLATE AND BENAZEPRIL HYDROCHLORIDE 10; 40 MG/1; MG/1
CAPSULE ORAL
Refills: 0 | Status: DISCONTINUED | COMMUNITY
End: 2023-12-26

## 2023-12-26 NOTE — HISTORY OF PRESENT ILLNESS
[FreeTextEntry1] : 67 yo male w/ pmh COPD, HTN, HLD, Hypertriglyceridemia, BRIAN, Prediabetes,Anxiety/Depression, PTSD, GERD, BOOKER presents for post hospitalization followup. He arrives today with his DTR He presented to [facility] with weakness x 2 weeks   Hospital course included CT Chest: Findings concerning for multifocal pneumonia, most prominent in the right upper lobe.Slightly enlarged mediastinal and right hilar lymph nodes, nonspecific and possibly reactive. Treated with IV antibiotics - Rocephin/Azithro, Pt had cute hypoxic respiratory failure due to PNA-treated with CPAP. Known Iron deficiency anemia , s/p 1 unit PRBC and Venofer. Also, developed new onset afib with RVR.  Pt given BB and oral anticoagulation He was discharged on oral A/C  He has completed a 5 day ambulatory monitoring that did not display an PAF.  He arrives today feeling very tired.  HIs DTR states he sleeps alot during the day.  And she feels he has been more forgetful, particularly since the end of the summer. She is trying to schedule a visit with a neurologist. He is on alot of mood stabilizers but has been for many years. He was falling asleep during the office visit, reporting he does not sleep well at night.  He does not use CPAP for diagnosed BOOKER .  He is scheduled for follow up with his pulmonologist in January 19th ([facility] pulm clinic) He also has been reporting dizzy spells on standing for time to time. As per his DTR, he is not very mobile, does not take walks.  He denies chest pains or pressure. He  denies feeling faint or fainting, He   denies palpitations or difficulty breathing while laying flat. He denies lower extremity swelling.

## 2023-12-26 NOTE — REVIEW OF SYSTEMS
[Feeling Fatigued] : feeling fatigued [Dizziness] : dizziness [Negative] : Heme/Lymph [de-identified] : see HPI

## 2023-12-26 NOTE — ADDENDUM
[FreeTextEntry1] : recently dc pv with pna, resp failure, anemia new af at that time, treated with ac and bb after dc had monitor, no af can incr losartan

## 2023-12-26 NOTE — DISCUSSION/SUMMARY
[FreeTextEntry1] : 69 yo male w/ pmh COPD, HTN, HLD, Hypertriglyceridemia, BRIAN, Prediabetes,Anxiety/Depression, PTSD, GERD, BOOKER presents for post hospitalization followup.  I have reviewed all of the medical records available to me at this time from his admission at length, including consultations, laboratory records, radiologic records, medication administration records and discharge summary.  He arrives in no acute distress but is visibly fatigued.  He is euvolemic on exam.  He does not report symptoms consistent with HF, angina or unstable arrhythmia. His blood pressure is still elevated despite resting in the office.  He is not orthostatic.  ECG illustrates a sinus rhythm.  Mr Aaron has newly diagnosed PAF in the setting of acute infection.  He is maintaining sinus rhythm today on ECG and his 5 day ambulatory monitor was without evidence of PAF recurrence. For now, he will continue metoprolol 50mg for rate control. He will remain on eliquis 5mg BID, CHADS-Vasc is 2. He undergo a 2d echo to assess for any new structural heart disease, changes in valvular and ventricular function.  To optimize B/P control, he will increase losartan to 50mg daily. He will continue to monitor his B/P at home especially during times of dizzy spells.  I have encouraged his follow-up with pulmonology regarding BOOKER He will also need follow up with neurology regarding most recent symptoms of forgetfulness.  He will followup in 6 weeks,  sooner if necessary. [EKG obtained to assist in diagnosis and management of assessed problem(s)] : EKG obtained to assist in diagnosis and management of assessed problem(s)

## 2024-01-17 ENCOUNTER — MED ADMIN CHARGE (OUTPATIENT)
Age: 69
End: 2024-01-17

## 2024-01-17 ENCOUNTER — APPOINTMENT (OUTPATIENT)
Dept: CARDIOLOGY | Facility: CLINIC | Age: 69
End: 2024-01-17
Payer: MEDICARE

## 2024-01-17 PROCEDURE — 93306 TTE W/DOPPLER COMPLETE: CPT

## 2024-01-17 RX ORDER — PERFLUTREN 6.52 MG/ML
6.52 INJECTION, SUSPENSION INTRAVENOUS
Qty: 1 | Refills: 0 | Status: COMPLETED | OUTPATIENT
Start: 2024-01-17

## 2024-01-17 RX ADMIN — PERFLUTREN MG/ML: 6.52 INJECTION, SUSPENSION INTRAVENOUS at 00:00

## 2024-02-07 ENCOUNTER — APPOINTMENT (OUTPATIENT)
Dept: CARDIOLOGY | Facility: CLINIC | Age: 69
End: 2024-02-07
Payer: MEDICARE

## 2024-02-07 ENCOUNTER — NON-APPOINTMENT (OUTPATIENT)
Age: 69
End: 2024-02-07

## 2024-02-07 VITALS
OXYGEN SATURATION: 97 % | HEART RATE: 58 BPM | HEIGHT: 70 IN | WEIGHT: 261 LBS | BODY MASS INDEX: 37.37 KG/M2 | DIASTOLIC BLOOD PRESSURE: 84 MMHG | SYSTOLIC BLOOD PRESSURE: 157 MMHG

## 2024-02-07 PROCEDURE — 93000 ELECTROCARDIOGRAM COMPLETE: CPT

## 2024-02-07 PROCEDURE — 99214 OFFICE O/P EST MOD 30 MIN: CPT | Mod: 25

## 2024-02-07 RX ORDER — LOSARTAN POTASSIUM 100 MG/1
100 TABLET, FILM COATED ORAL DAILY
Qty: 90 | Refills: 3 | Status: ACTIVE | COMMUNITY
Start: 1900-01-01 | End: 1900-01-01

## 2024-02-07 NOTE — DISCUSSION/SUMMARY
[FreeTextEntry1] : Mr. Walker has newly diagnosed PAF in the setting of acute infection.  He is maintaining sinus rhythm today on ECG and his 5 day ambulatory monitor was without evidence of PAF recurrence. Jono feels well from a cardiovascular perspective.  He will continue Eliquis noting his high risk of cardioembolic stroke in the setting of paroxysmal atrial fibrillation.  He will continue metoprolol.      I reviewed his echo from January 17, 2024.  This revealed a normal ejection fraction, without significant valvular disease.  His blood pressure is elevated, and does not improve by the conclusion of the examination.  He will increase losartan up to 100 mg daily.  He will start checking his blood pressure twice daily at home, and send me a list of blood pressures in about 1 month.  We can make further adjustments to his blood pressure medication as appropriate.  He will plan on seeing me in about 3 months.   [EKG obtained to assist in diagnosis and management of assessed problem(s)] : EKG obtained to assist in diagnosis and management of assessed problem(s)

## 2024-02-07 NOTE — REVIEW OF SYSTEMS
[Feeling Fatigued] : feeling fatigued [Dizziness] : dizziness [Negative] : Heme/Lymph [de-identified] : see HPI

## 2024-02-07 NOTE — HISTORY OF PRESENT ILLNESS
[FreeTextEntry1] : Jono presented to the office today for a follow-up evaluation.  He was last seen in the office about 6 weeks ago.  He has now 68 years old, with a history of COPD, hypertension, hypercholesterolemia with hypertriglyceridemia and prediabetes.  He has a history of PTSD, anxiety and depression.  He has a history of sleep apnea.  He was admitted to [facility] with weakness for several weeks, and was treated for multifocal pneumonia, most prominent in the right upper lobe.  He has significant mediastinal and hilar lymphadenopathy.  He was treated with antibiotics.  He was treated with supplemental oxygen and CPAP.  He was transfused for anemia.  His course was also notable for atrial fibrillation with a rapid ventricular response.  This was treated with rate control, and he was discharged on oral anticoagulants.  He was seen in the office in December 2023, and was feeling well.  A recent 5-day extended Holter did not reveal any atrial fibrillation.  His blood pressure was elevated, and losartan was increased.  He was encouraged to follow-up with neurology given his forgetfulness, as well as pulmonology, given his sleep apnea.  He was referred for an echocardiogram, which was performed January 17, 2024.  This revealed a normal ejection fraction, without significant valvular disease.  He presents to the office today feeling well.  He does not report symptoms suggestive of angina, congestive heart failure or arrhythmia. He saw Dr. Gale, and is planning on seeing his hematologist. He does not check his blood pressure at home.
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
## 2024-03-06 RX ORDER — CHLORTHALIDONE 25 MG/1
25 TABLET ORAL DAILY
Qty: 90 | Refills: 3 | Status: ACTIVE | COMMUNITY
Start: 2024-02-15

## 2024-03-11 LAB
ANION GAP SERPL CALC-SCNC: 13 MMOL/L
BUN SERPL-MCNC: 24 MG/DL
CALCIUM SERPL-MCNC: 9.4 MG/DL
CHLORIDE SERPL-SCNC: 101 MMOL/L
CO2 SERPL-SCNC: 28 MMOL/L
CREAT SERPL-MCNC: 1.01 MG/DL
EGFR: 82 ML/MIN/1.73M2
GLUCOSE SERPL-MCNC: 105 MG/DL
POTASSIUM SERPL-SCNC: 4.2 MMOL/L
SODIUM SERPL-SCNC: 142 MMOL/L

## 2024-03-19 RX ORDER — METOPROLOL SUCCINATE 50 MG/1
50 TABLET, EXTENDED RELEASE ORAL DAILY
Qty: 90 | Refills: 3 | Status: ACTIVE | COMMUNITY

## 2024-04-12 NOTE — PATIENT PROFILE ADULT - DO YOU FEEL THREATENED BY OTHERS?
Preventive Care Visit  [facility] LINNEA Licea MD, Family Medicine  Apr 12, 2024      Assessment & Plan     (Z00.01) Encounter for general adult medical examination with abnormal findings  (primary encounter diagnosis)  Comment: We discussed self breast exams, exercise 30mins/day, and calcium with vitamin D at 1200mg/day, preferably from dietary sources.  Diet, Weight loss, and Exercise were discussed as well.       (I10) Essential hypertension  Comment: Adding in amlodipine 10mg/day - Discussed risks/benefits/side effects with the patient. Continue losartan 100 and hydrochlorothiazide 25. Send me data in two weeks. Bmp today. The patient indicates understanding of these issues and agrees with the plan.   Plan: losartan (COZAAR) 100 MG tablet, amLODIPine         (NORVASC) 10 MG tablet, hydrochlorothiazide         (HYDRODIURIL) 25 MG tablet, Basic metabolic         panel  (Ca, Cl, CO2, Creat, Gluc, K, Na, BUN)            (F33.1) Major depressive disorder, recurrent episode, moderate (H)  Comment: continue on this as it is helpful   Plan: sertraline (ZOLOFT) 100 MG tablet            (Z97.5) IUD (intrauterine device) in place  Comment: unsure if she is through menopause. No bleeding. No hot flashes. Will check labs   Plan: Luteinizing Hormone, Follicle stimulating         hormone            (E66.09,  Z68.32) Class 1 obesity due to excess calories without serious comorbidity with body mass index (BMI) of 32.0 to 32.9 in adult  Comment: discussed diet/exercise/and various other issues with weight/fitness, claudette as we get older. The patient indicates understanding of these issues and agrees with the plan.   Plan:     (Z12.31) Encounter for screening mammogram for breast cancer  Comment: screening  Plan: MA Screen Bilateral w/Tip            (Z13.220) Lipid screening  Comment: discussed   Plan: Lipid panel reflex to direct LDL Fasting            Patient has been advised of split billing requirements  "and indicates understanding: Yes        BMI  Estimated body mass index is 32.3 kg/m  as calculated from the following:    Height as of this encounter: 1.67 m (5' 5.75\").    Weight as of this encounter: 90.1 kg (198 lb 9.6 oz).   Weight management plan: Discussed healthy diet and exercise guidelines    Counseling  Appropriate preventive services were discussed with this patient, including applicable screening as appropriate for fall prevention, nutrition, physical activity, Tobacco-use cessation, weight loss and cognition.  Checklist reviewing preventive services available has been given to the patient.  Reviewed patient's diet, addressing concerns and/or questions.   She is at risk for lack of exercise and has been provided with information to increase physical activity for the benefit of her well-being.   The patient reports drinking more than one alcoholic drink per day and sometimes engages in binge or excessive drinking. The patient was counseled and given information about possible harmful effects of excessive alcohol intake as well as where to get help for alcohol problems. The patient's PHQ-9 score is consistent with mild depression. She was provided with information regarding depression.           Soraida Hennessy is a 53 year old, presenting for the following:  Physical        4/12/2024    10:56 AM   Additional Questions   Roomed by TRISTIAN Tomlinson   Accompanied by self        Health Care Directive  Patient does not have a Health Care Directive or Living Will: Discussed advance care planning with patient; information given to patient to review.    HPI  Declined shingles, tdap and covid     Needs medication renewal     Has an IUD in   Doesn't know if she is in menopause       Hypertension Follow-up    Do you check your blood pressure regularly outside of the clinic? Yes   Are you following a low salt diet? Yes  Are your blood pressures ever more than 140 on the top number (systolic) OR more   than 90 on " the bottom number (diastolic), for example 140/90? Yes    Increased losartan to 100mg fall 2023, continued hydrochlorothiazide 25   Blood pressure  is still running high, even at home.        BP Readings from Last 6 Encounters:   04/12/24 (!) 140/90   03/28/24 (!) 148/98   12/05/22 (!) 157/87   08/22/22 118/84   05/31/22 136/82   01/07/22 126/76     Wt Readings from Last 10 Encounters:   04/12/24 90.1 kg (198 lb 9.6 oz)   12/05/22 79.8 kg (176 lb)   08/22/22 79.8 kg (176 lb)   05/31/22 81.2 kg (179 lb 1.6 oz)   07/27/21 84.5 kg (186 lb 4.8 oz)   03/04/21 89.8 kg (198 lb)   09/01/20 89.6 kg (197 lb 9.6 oz)   08/18/20 89.3 kg (196 lb 12.8 oz)   01/28/19 87.5 kg (193 lb)   07/25/18 83.6 kg (184 lb 6 oz)         Depression   How are you doing with your depression since your last visit? Improved   Are you having other symptoms that might be associated with depression? No  Have you had a significant life event?  No   Are you feeling anxious or having panic attacks?   No  Do you have any concerns with your use of alcohol or other drugs? No    Social History     Tobacco Use    Smoking status: Never    Smokeless tobacco: Never   Vaping Use    Vaping status: Never Used   Substance Use Topics    Alcohol use: Yes    Drug use: Never         2/14/2023     3:19 PM 9/26/2023     2:05 PM 4/11/2024    10:18 PM   PHQ   PHQ-9 Total Score 2 2 5   Q9: Thoughts of better off dead/self-harm past 2 weeks Not at all Not at all Not at all         4/26/2022     3:51 PM 2/14/2023     3:19 PM 9/26/2023     2:05 PM   ASHISH-7 SCORE   Total Score 15 (severe anxiety)     Total Score 15 0 1         4/11/2024    10:18 PM   Last PHQ-9   1.  Little interest or pleasure in doing things 1   2.  Feeling down, depressed, or hopeless 1   3.  Trouble falling or staying asleep, or sleeping too much 1   4.  Feeling tired or having little energy 1   5.  Poor appetite or overeating 1   6.  Feeling bad about yourself 0   7.  Trouble concentrating 0   8.  Moving  slowly or restless 0   Q9: Thoughts of better off dead/self-harm past 2 weeks 0   PHQ-9 Total Score 5       Suicide Assessment Five-step Evaluation and Treatment (SAFE-T)          4/11/2024   General Health   How would you rate your overall physical health? Good   Feel stress (tense, anxious, or unable to sleep) Rather much   (!) STRESS CONCERN      4/11/2024   Nutrition   Three or more servings of calcium each day? Yes   Diet: Gluten-free/reduced   How many servings of fruit and vegetables per day? (!) 2-3   How many sweetened beverages each day? 0-1         4/11/2024   Exercise   Days per week of moderate/strenous exercise 2 days   Average minutes spent exercising at this level 20 min   (!) EXERCISE CONCERN      4/11/2024   Social Factors   Frequency of gathering with friends or relatives Once a week   Worry food won't last until get money to buy more No   Food not last or not have enough money for food? No   Do you have housing?  Yes   Are you worried about losing your housing? No   Lack of transportation? No   Unable to get utilities (heat,electricity)? No         4/11/2024   Fall Risk   Fallen 2 or more times in the past year? No   Trouble with walking or balance? No          4/11/2024   Dental   Dentist two times every year? Yes         4/11/2024   TB Screening   Were you born outside of the [place]? No       Today's PHQ-9 Score:       4/11/2024    10:18 PM   PHQ-9 SCORE   PHQ-9 Total Score MyChart 5 (Mild depression)   PHQ-9 Total Score 5         4/11/2024   Substance Use   Alcohol more than 3/day or more than 7/wk Yes   How often do you have a drink containing alcohol 2 to 3 times a week   How many alcohol drinks on typical day 1 or 2   How often do you have 5+ drinks at one occasion Monthly   Audit 2/3 Score 2   How often not able to stop drinking once started Never   How often failed to do what normally expected Never   How often needed first drink in am after a heavy drinking session Never   How often feeling  "of guilt or remorse after drinking Never   How often unable to remember what happened the night before Never   Have you or someone else been injured because of your drinking No   Has anyone been concerned or suggested you cut down on drinking No   TOTAL SCORE - AUDIT 5   Do you use any other substances recreationally? (!) ALCOHOL     Social History     Tobacco Use    Smoking status: Never    Smokeless tobacco: Never   Vaping Use    Vaping status: Never Used   Substance Use Topics    Alcohol use: Yes    Drug use: Never                  4/11/2024   STI Screening   New sexual partner(s) since last STI/HIV test? No     History of abnormal Pap smear:         Latest Ref Rng & Units 3/4/2021     1:57 PM 5/23/2016     9:29 AM   PAP / HPV   PAP Negative for squamous intraepithelial lesion or malignancy. Negative for squamous intraepithelial lesion or malignancy  Electronically signed by Leena Lopez CT (ASCP) on 3/15/2021 at  3:53 PM    Negative for squamous intraepithelial lesion or malignancy  Electronically signed by Donna Pineda CT (ASCP) on 5/31/2016 at 12:42 PM      HPV 16 DNA NEG Negative     HPV 18 DNA NEG Negative     Other HR HPV NEG Negative       ASCVD Risk   The 10-year ASCVD risk score (Suzanne BAH, et al., 2019) is: 3.4%    Values used to calculate the score:      Age: 53 years      Sex: Female      Is Non- : No      Diabetic: No      Tobacco smoker: No      Systolic Blood Pressure: 148 mmHg      Is BP treated: Yes      HDL Cholesterol: 72 mg/dL      Total Cholesterol: 311 mg/dL           Reviewed and updated as needed this visit by Provider                          Review of Systems  Constitutional, neuro, ENT, endocrine, pulmonary, cardiac, gastrointestinal, genitourinary, musculoskeletal, integument and psychiatric systems are negative, except as otherwise noted.     Objective    Exam  Pulse 85   Temp 98.3  F (36.8  C) (Tympanic)   Ht 1.67 m (5' 5.75\")   " "Wt 90.1 kg (198 lb 9.6 oz)   SpO2 96%   BMI 32.30 kg/m     Estimated body mass index is 32.3 kg/m  as calculated from the following:    Height as of this encounter: 1.67 m (5' 5.75\").    Weight as of this encounter: 90.1 kg (198 lb 9.6 oz).    Physical Exam  GENERAL: alert and no distress  EYES: Eyes grossly normal to inspection, PERRL and conjunctivae and sclerae normal  HENT: ear canals and TM's normal, nose and mouth without ulcers or lesions  NECK: no adenopathy, no asymmetry, masses, or scars  RESP: lungs clear to auscultation - no rales, rhonchi or wheezes  CV: regular rate and rhythm, normal S1 S2, no S3 or S4, no murmur, click or rub, no peripheral edema  ABDOMEN: soft, nontender, no hepatosplenomegaly, no masses and bowel sounds normal  MS: no gross musculoskeletal defects noted, no edema  SKIN: no suspicious lesions or rashes  NEURO: Normal strength and tone, mentation intact and speech normal  PSYCH: mentation appears normal, affect normal/bright        Signed Electronically by: Toya Licea MD    " no

## 2024-05-06 ENCOUNTER — APPOINTMENT (OUTPATIENT)
Dept: CARDIOLOGY | Facility: CLINIC | Age: 69
End: 2024-05-06
Payer: MEDICARE

## 2024-05-06 ENCOUNTER — NON-APPOINTMENT (OUTPATIENT)
Age: 69
End: 2024-05-06

## 2024-05-06 VITALS
SYSTOLIC BLOOD PRESSURE: 112 MMHG | HEIGHT: 70 IN | HEART RATE: 58 BPM | DIASTOLIC BLOOD PRESSURE: 72 MMHG | OXYGEN SATURATION: 93 % | WEIGHT: 251 LBS | BODY MASS INDEX: 35.93 KG/M2

## 2024-05-06 DIAGNOSIS — I10 ESSENTIAL (PRIMARY) HYPERTENSION: ICD-10-CM

## 2024-05-06 DIAGNOSIS — I48.91 UNSPECIFIED ATRIAL FIBRILLATION: ICD-10-CM

## 2024-05-06 PROCEDURE — G2211 COMPLEX E/M VISIT ADD ON: CPT

## 2024-05-06 PROCEDURE — 93000 ELECTROCARDIOGRAM COMPLETE: CPT

## 2024-05-06 PROCEDURE — 99214 OFFICE O/P EST MOD 30 MIN: CPT

## 2024-05-06 NOTE — REVIEW OF SYSTEMS
[Feeling Fatigued] : feeling fatigued [Dizziness] : dizziness [Negative] : Heme/Lymph [de-identified] : see HPI

## 2024-05-06 NOTE — HISTORY OF PRESENT ILLNESS
[FreeTextEntry1] : Jono presented to the office today for a follow-up evaluation.  He was last seen in the office about 3 months ago.  He is now 68 years old, with a history of COPD, hypertension, hypercholesterolemia with hypertriglyceridemia and prediabetes.  He has a history of PTSD, anxiety and depression.  He has a history of sleep apnea.  He was admitted to [facility] with weakness for several weeks, and was treated for multifocal pneumonia, most prominent in the right upper lobe.  He has significant mediastinal and hilar lymphadenopathy.  He was treated with antibiotics.  He was treated with supplemental oxygen and CPAP.  He was transfused for anemia.  His course was also notable for atrial fibrillation with a rapid ventricular response.  This was treated with rate control, and he was discharged on oral anticoagulants.  He was seen in the office in December 2023, and was feeling well.  A 5-day extended Holter did not reveal any atrial fibrillation.  His blood pressure was elevated, and losartan was increased.  He was encouraged to follow-up with neurology given his forgetfulness, as well as pulmonology, given his sleep apnea.  He was referred for an echocardiogram, which was performed January 17, 2024.  This revealed a normal ejection fraction, without significant valvular disease.  He was seen in February, 2024, and his blood pressure was elevated.  Based on blood pressure measurements at home, he was started on chlorthalidone, which was later increased.  He presents to the office today feeling well.  He does not report symptoms suggestive of angina, congestive heart failure or arrhythmia. He has been dizzy at times.  He does check his blood pressure at home, and recently has been getting systolics in the range of 140 and 150. He was making errors with his medication but his daughter is now taking charge of it.

## 2024-05-06 NOTE — DISCUSSION/SUMMARY
[EKG obtained to assist in diagnosis and management of assessed problem(s)] : EKG obtained to assist in diagnosis and management of assessed problem(s) [FreeTextEntry1] : Mr. Walker has newly diagnosed PAF in the setting of acute infection.  He is maintaining sinus rhythm today on ECG and his 5 day ambulatory monitor was without evidence of PAF recurrence.He will continue Eliquis noting his high risk of cardioembolic stroke in the setting of paroxysmal atrial fibrillation.  He will continue metoprolol.      I reviewed his echo from January 17, 2024.  This revealed a normal ejection fraction, without significant valvular disease.  His blood pressure is low today.  However, he says that it has been high at home.  He has been getting lightheadedness issues, which sound like he might be having intermittent hypotension.  I am suspicious that he is overmedicated, but his blood pressure measurements at home do not agree with that.  I have suggested that he check his blood pressure twice daily at home over the next 2 weeks.  If I have outstanding concerns, I might consider a 24-hour blood pressure monitor.  I am concerned that he needs less medication, and I would lean toward the conclusion, pending his clinical course.     He will plan on seeing me in about 3 months.

## 2024-05-17 NOTE — ED ADULT TRIAGE NOTE - TEMPERATURE IN FAHRENHEIT (DEGREES F)
Comprehensive Nutrition Assessment    Type and Reason for Visit:  Initial    Nutrition Recommendations/Plan:   Encourage oral intakes     Malnutrition Assessment:  Malnutrition Status:  At risk for malnutrition (Comment) (05/17/24 1139)    Context:  Acute Illness     Findings of the 6 clinical characteristics of malnutrition:  Energy Intake:  Mild decrease in energy intake (Comment) (pta)  Weight Loss:  No significant weight loss     Body Fat Loss:  No significant body fat loss     Muscle Mass Loss:  No significant muscle mass loss    Fluid Accumulation:  No significant fluid accumulation     Strength:  Not Performed    Nutrition Assessment:    Predicted suboptimal nutrient intakes r/t altered GI function, AEB n/v pta.  Weight at admission slightly below prior known values with current weight surpassing. N/V appear resolved and PO better per I/O data with % recorded at meals. \"Starving\" when visited 1/2 hour prior to lunch.  Well controlled diabetes per A1C 5.8 on Tradjenta at home. No idea of usual weight, states \"124#\" as a usual.    Nutrition Related Findings:    no edema, active b/s. Wound Type: None       Current Nutrition Intake & Therapies:    Average Meal Intake: %  Average Supplements Intake: None Ordered  ADULT DIET; Regular; 4 carb choices (60 gm/meal)    Anthropometric Measures:  Height: 160 cm (5' 3\")  Ideal Body Weight (IBW): 115 lbs (52 kg)    Admission Body Weight: 72.1 kg (159 lb)  Current Body Weight: 75.5 kg (166 lb 8 oz), 144.8 % IBW. Weight Source: Bed Scale  Current BMI (kg/m2): 29.5  Usual Body Weight: 72.6 kg (160 lb)  % Weight Change (Calculated): 4.1  Weight Adjustment For: No Adjustment                 BMI Categories: Overweight (BMI 25.0-29.9)    Estimated Daily Nutrient Needs:  Energy Requirements Based On: Kcal/kg  Weight Used for Energy Requirements: Current  Energy (kcal/day): 0461-1195 (18-22)  Weight Used for Protein Requirements: Ideal  Protein (g/day): 63-73  98

## 2024-05-29 RX ORDER — LORAZEPAM 1 MG/1
1 TABLET ORAL 3 TIMES DAILY
Refills: 0 | Status: ACTIVE | COMMUNITY

## 2024-05-29 RX ORDER — CLONAZEPAM 2 MG/1
TABLET ORAL
Refills: 0 | Status: DISCONTINUED | COMMUNITY
End: 2024-05-29

## 2024-05-29 RX ORDER — BUPROPION HYDROCHLORIDE 100 MG/1
100 TABLET, FILM COATED ORAL DAILY
Refills: 0 | Status: ACTIVE | COMMUNITY

## 2024-05-29 RX ORDER — FERROUS SULFATE 325(65) MG
325 (65 FE) TABLET ORAL
Refills: 0 | Status: ACTIVE | COMMUNITY

## 2024-05-29 RX ORDER — ARIPIPRAZOLE 2 MG/1
TABLET ORAL
Refills: 0 | Status: ACTIVE | COMMUNITY

## 2024-05-31 RX ORDER — APIXABAN 5 MG/1
5 TABLET, FILM COATED ORAL
Qty: 90 | Refills: 3 | Status: ACTIVE | COMMUNITY

## 2024-08-05 ENCOUNTER — NON-APPOINTMENT (OUTPATIENT)
Age: 69
End: 2024-08-05

## 2024-08-05 ENCOUNTER — APPOINTMENT (OUTPATIENT)
Dept: CARDIOLOGY | Facility: CLINIC | Age: 69
End: 2024-08-05

## 2024-08-05 PROCEDURE — 93000 ELECTROCARDIOGRAM COMPLETE: CPT

## 2024-08-05 PROCEDURE — 99214 OFFICE O/P EST MOD 30 MIN: CPT

## 2024-08-05 PROCEDURE — G2211 COMPLEX E/M VISIT ADD ON: CPT

## 2024-08-05 NOTE — REVIEW OF SYSTEMS
[Feeling Fatigued] : feeling fatigued [Dizziness] : dizziness [Negative] : Heme/Lymph [de-identified] : see HPI

## 2024-08-05 NOTE — HISTORY OF PRESENT ILLNESS
[FreeTextEntry1] : Jono presented to the office today for a follow-up evaluation.  He was last seen in the office about 3 months ago.  He is now 69 years old, with a history of COPD, hypertension, hypercholesterolemia with hypertriglyceridemia and prediabetes.  He has a history of PTSD, anxiety and depression.  He has a history of sleep apnea.  He was admitted to [facility] with weakness for several weeks, and was treated for multifocal pneumonia, most prominent in the right upper lobe.  He has significant mediastinal and hilar lymphadenopathy.  He was treated with antibiotics.  He was treated with supplemental oxygen and CPAP.  He was transfused for anemia.  His course was also notable for atrial fibrillation with a rapid ventricular response.  This was treated with rate control, and he was discharged on oral anticoagulants.  He was seen in the office in December 2023, and was feeling well.  A 5-day extended Holter did not reveal any atrial fibrillation.  His blood pressure was elevated, and losartan was increased.  He was encouraged to follow-up with neurology given his forgetfulness, as well as pulmonology, given his sleep apnea.  He was referred for an echocardiogram, which was performed January 17, 2024.  This revealed a normal ejection fraction, without significant valvular disease.  He was seen in February, 2024, and his blood pressure was elevated.  Based on blood pressure measurements at home, he was started on chlorthalidone, which was later increased.  I saw him in May, 2024, feeling well.  His blood pressure control was uncertain.  I had to keep a careful blood pressure log.  This revealed labile blood pressures.  There was dizziness at times, but blood pressure was never checked with those episodes, and I asked that it be checked.  He presents to the office today feeling well.  He does not report symptoms suggestive of angina, congestive heart failure or arrhythmia. He has been less dizzy, noting that he now takes losartan and chlorthalidone in the morning and metoprolol in the evenings.  That seems to have helped quite a bit, though he does have mild symptoms at times.  He does check his blood pressure at home, and recently has been getting systolics in the range of 130-150. He reports that his cholesterol has been well controlled.

## 2024-08-05 NOTE — CARDIOLOGY SUMMARY
[de-identified] : February 7, 2024 normal sinus rhythm May 6, 2024 normal sinus rhythm August 5, 2024 normal sinus rhythm

## 2024-08-05 NOTE — DISCUSSION/SUMMARY
[FreeTextEntry1] : Mr. Walker has newly diagnosed PAF in the setting of acute infection.  He is maintaining sinus rhythm today on ECG and his 5 day ambulatory monitor was without evidence of PAF recurrence.He will continue Eliquis noting his high risk of cardioembolic stroke in the setting of paroxysmal atrial fibrillation.  He will continue metoprolol.      I reviewed his echo from January 17, 2024.  This revealed a normal ejection fraction, without significant valvular disease.  There was a recent change in the timing of his medication, which seems to have worked well.  She will continue this, and let me know if there are any other new issues.  He will see me in 6 months. [EKG obtained to assist in diagnosis and management of assessed problem(s)] : EKG obtained to assist in diagnosis and management of assessed problem(s)

## 2024-09-12 NOTE — CONSULT NOTE ADULT - NS_MD_PANP_GEN_ALL_CORE
Attending and PA/NP shared services statement (NON-critical care): C/O right hip pain. S/P mechanical fall. denies hitting head or LOC. denies anticoagulation use. No complaints of chest pain, headache, nausea, dizziness, vomiting  SOB, fever, chills verbalized. Phx stents(2000) Subdural.

## 2024-11-27 ENCOUNTER — RX RENEWAL (OUTPATIENT)
Age: 69
End: 2024-11-27

## 2025-01-13 ENCOUNTER — RX RENEWAL (OUTPATIENT)
Age: 70
End: 2025-01-13

## 2025-02-03 ENCOUNTER — NON-APPOINTMENT (OUTPATIENT)
Age: 70
End: 2025-02-03

## 2025-02-03 ENCOUNTER — APPOINTMENT (OUTPATIENT)
Dept: CARDIOLOGY | Facility: CLINIC | Age: 70
End: 2025-02-03
Payer: MEDICARE

## 2025-02-03 VITALS
BODY MASS INDEX: 34.07 KG/M2 | HEART RATE: 66 BPM | WEIGHT: 238 LBS | OXYGEN SATURATION: 99 % | DIASTOLIC BLOOD PRESSURE: 75 MMHG | SYSTOLIC BLOOD PRESSURE: 121 MMHG | HEIGHT: 70 IN

## 2025-02-03 VITALS — SYSTOLIC BLOOD PRESSURE: 115 MMHG | DIASTOLIC BLOOD PRESSURE: 75 MMHG

## 2025-02-03 DIAGNOSIS — I10 ESSENTIAL (PRIMARY) HYPERTENSION: ICD-10-CM

## 2025-02-03 DIAGNOSIS — I48.91 UNSPECIFIED ATRIAL FIBRILLATION: ICD-10-CM

## 2025-02-03 PROCEDURE — 93000 ELECTROCARDIOGRAM COMPLETE: CPT

## 2025-02-03 PROCEDURE — 99214 OFFICE O/P EST MOD 30 MIN: CPT

## 2025-02-04 LAB
ALBUMIN SERPL ELPH-MCNC: 4.4 G/DL
ALP BLD-CCNC: 117 U/L
ALT SERPL-CCNC: 22 U/L
ANION GAP SERPL CALC-SCNC: 15 MMOL/L
AST SERPL-CCNC: 38 U/L
BILIRUB SERPL-MCNC: 0.4 MG/DL
BUN SERPL-MCNC: 25 MG/DL
CALCIUM SERPL-MCNC: 9.6 MG/DL
CHLORIDE SERPL-SCNC: 100 MMOL/L
CHOLEST SERPL-MCNC: 150 MG/DL
CO2 SERPL-SCNC: 28 MMOL/L
CREAT SERPL-MCNC: 1.48 MG/DL
EGFR: 51 ML/MIN/1.73M2
ESTIMATED AVERAGE GLUCOSE: 128 MG/DL
GLUCOSE SERPL-MCNC: 109 MG/DL
HBA1C MFR BLD HPLC: 6.1 %
HCT VFR BLD CALC: 40.1 %
HDLC SERPL-MCNC: 46 MG/DL
HGB BLD-MCNC: 13.3 G/DL
LDLC SERPL CALC-MCNC: 66 MG/DL
MCHC RBC-ENTMCNC: 31.8 PG
MCHC RBC-ENTMCNC: 33.2 G/DL
MCV RBC AUTO: 95.9 FL
NONHDLC SERPL-MCNC: 104 MG/DL
PLATELET # BLD AUTO: 191 K/UL
POTASSIUM SERPL-SCNC: 3.7 MMOL/L
PROT SERPL-MCNC: 7.2 G/DL
RBC # BLD: 4.18 M/UL
RBC # FLD: 12.2 %
SODIUM SERPL-SCNC: 143 MMOL/L
TRIGL SERPL-MCNC: 229 MG/DL
TSH SERPL-ACNC: 1.89 UIU/ML
WBC # FLD AUTO: 9.74 K/UL

## 2025-02-19 NOTE — H&P ADULT - PROBLEM SELECTOR PLAN 2
Patient : Jeremy Ardon Age: 82 [id number]  Signed on  Workstation ID: [id number]               Leslye Baires NP  [address] [facility] [address] [phone]    Schedule an appointment as soon as possible for a visit   Revaluation after cardiology evaluation for final preoperative clearance    Ana Maria Cohen APNP  [address] [facility] [address] [phone]    Yordan Alcaraz MD  02/19/25 [id number]       Yordan Alcaraz MD  02/20/25 [id number]     h/o iron deficiency anemia w/ multiple iron transfusions in his past  -HgB 7.8, Hct 27.4  -f/u iron studies  -transfuse for HgB <7 or if patient symptomatic h/o iron deficiency anemia w/ multiple iron transfusions in his past  -HgB 7.8, Hct 27.4  -f/u iron studies  -transfuse for HgB <7 or if patient symptomatic  -on [facility] in Jan 2022 patient had a Hgb of 13.1 and today it is 7.8  -f/u FOBT h/o iron deficiency anemia w/ multiple iron transfusions in his past  -colonoscopy 2 years ago w/ polyps and diverticuli; father history colon cancer  -HgB 7.8, Hct 27.4  -f/u iron studies  -transfuse for HgB <7 or if patient symptomatic  -on [facility] in Jan 2022 patient had a Hgb of 13.1 and today it is 7.8  -f/u FOBT

## 2025-02-20 ENCOUNTER — RX RENEWAL (OUTPATIENT)
Age: 70
End: 2025-02-20

## 2025-02-20 RX ORDER — LOSARTAN POTASSIUM 100 MG/1
100 TABLET, FILM COATED ORAL
Qty: 90 | Refills: 3 | Status: ACTIVE | COMMUNITY
Start: 2025-02-20 | End: 1900-01-01

## 2025-03-10 ENCOUNTER — APPOINTMENT (OUTPATIENT)
Dept: CARDIOLOGY | Facility: CLINIC | Age: 70
End: 2025-03-10
Payer: MEDICARE

## 2025-03-10 PROCEDURE — 93790 AMBL BP MNTR W/SW I&R: CPT

## 2025-03-20 ENCOUNTER — RX RENEWAL (OUTPATIENT)
Age: 70
End: 2025-03-20

## 2025-04-17 NOTE — CONSULT NOTE ADULT - WEIGHT IN LBS
Called pt at [phone] left vm message still waiting to receive dental clearance before scheduling surgery, any questions [phone]   [phone]

## 2025-05-09 ENCOUNTER — APPOINTMENT (OUTPATIENT)
Dept: NEUROLOGY | Facility: CLINIC | Age: 70
End: 2025-05-09
Payer: MEDICARE

## 2025-05-09 ENCOUNTER — NON-APPOINTMENT (OUTPATIENT)
Age: 70
End: 2025-05-09

## 2025-05-09 VITALS
DIASTOLIC BLOOD PRESSURE: 72 MMHG | SYSTOLIC BLOOD PRESSURE: 128 MMHG | WEIGHT: 228 LBS | BODY MASS INDEX: 32.64 KG/M2 | HEART RATE: 69 BPM | HEIGHT: 70 IN

## 2025-05-09 DIAGNOSIS — R41.3 OTHER AMNESIA: ICD-10-CM

## 2025-05-09 DIAGNOSIS — R42 DIZZINESS AND GIDDINESS: ICD-10-CM

## 2025-05-09 DIAGNOSIS — M54.9 DORSALGIA, UNSPECIFIED: ICD-10-CM

## 2025-05-09 PROCEDURE — 99205 OFFICE O/P NEW HI 60 MIN: CPT

## 2025-05-09 RX ORDER — SEMAGLUTIDE 1.34 MG/ML
2 INJECTION, SOLUTION SUBCUTANEOUS
Refills: 0 | Status: ACTIVE | COMMUNITY

## 2025-05-21 ENCOUNTER — APPOINTMENT (OUTPATIENT)
Dept: MRI IMAGING | Facility: CLINIC | Age: 70
End: 2025-05-21
Payer: MEDICARE

## 2025-05-21 ENCOUNTER — OUTPATIENT (OUTPATIENT)
Dept: OUTPATIENT SERVICES | Facility: HOSPITAL | Age: 70
LOS: 1 days | End: 2025-05-21
Payer: MEDICARE

## 2025-05-21 DIAGNOSIS — R41.3 OTHER AMNESIA: ICD-10-CM

## 2025-05-21 DIAGNOSIS — Z96.651 PRESENCE OF RIGHT ARTIFICIAL KNEE JOINT: Chronic | ICD-10-CM

## 2025-05-21 DIAGNOSIS — Z98.89 OTHER SPECIFIED POSTPROCEDURAL STATES: Chronic | ICD-10-CM

## 2025-05-21 PROCEDURE — 76377 3D RENDER W/INTRP POSTPROCES: CPT | Mod: 26

## 2025-05-21 PROCEDURE — 70553 MRI BRAIN STEM W/O & W/DYE: CPT | Mod: 26

## 2025-05-21 PROCEDURE — 70553 MRI BRAIN STEM W/O & W/DYE: CPT

## 2025-05-21 PROCEDURE — 76377 3D RENDER W/INTRP POSTPROCES: CPT

## 2025-05-21 PROCEDURE — A9585: CPT

## 2025-06-26 NOTE — PATIENT PROFILE ADULT - PRIMARY SOURCE OF SUPPORT/COMFORT
As certified below, I, or a nurse practitioner or physician assistant working with me, had a face-to-face encounter that meets the physician face-to-face encounter requirements.
child(marlene)/spouse

## 2025-07-11 ENCOUNTER — APPOINTMENT (OUTPATIENT)
Dept: NEUROLOGY | Facility: CLINIC | Age: 70
End: 2025-07-11
Payer: MEDICARE

## 2025-07-11 VITALS
HEIGHT: 70 IN | WEIGHT: 230 LBS | SYSTOLIC BLOOD PRESSURE: 119 MMHG | BODY MASS INDEX: 32.93 KG/M2 | DIASTOLIC BLOOD PRESSURE: 70 MMHG | HEART RATE: 70 BPM

## 2025-07-11 PROBLEM — M54.2 NECK PAIN: Status: ACTIVE | Noted: 2025-07-11

## 2025-07-11 PROBLEM — R26.9 GAIT ABNORMALITY: Status: ACTIVE | Noted: 2025-06-27

## 2025-07-11 PROCEDURE — 99214 OFFICE O/P EST MOD 30 MIN: CPT

## 2025-08-01 ENCOUNTER — APPOINTMENT (OUTPATIENT)
Dept: CARDIOLOGY | Facility: CLINIC | Age: 70
End: 2025-08-01

## 2025-08-12 ENCOUNTER — APPOINTMENT (OUTPATIENT)
Dept: NEUROLOGY | Facility: CLINIC | Age: 70
End: 2025-08-12
Payer: MEDICARE

## 2025-08-12 PROCEDURE — 96137 PSYCL/NRPSYC TST PHY/QHP EA: CPT

## 2025-08-12 PROCEDURE — 96116 NUBHVL XM PHYS/QHP 1ST HR: CPT

## 2025-08-12 PROCEDURE — 96133 NRPSYC TST EVAL PHYS/QHP EA: CPT

## 2025-08-12 PROCEDURE — 96136 PSYCL/NRPSYC TST PHY/QHP 1ST: CPT

## 2025-08-12 PROCEDURE — 96132 NRPSYC TST EVAL PHYS/QHP 1ST: CPT

## 2025-08-12 PROCEDURE — 96121 NUBHVL XM PHY/QHP EA ADDL HR: CPT

## 2025-08-19 ENCOUNTER — APPOINTMENT (OUTPATIENT)
Dept: NEUROLOGY | Facility: CLINIC | Age: 70
End: 2025-08-19

## 2025-08-28 ENCOUNTER — APPOINTMENT (OUTPATIENT)
Dept: NEUROLOGY | Facility: CLINIC | Age: 70
End: 2025-08-28